# Patient Record
Sex: MALE | Race: BLACK OR AFRICAN AMERICAN | Employment: FULL TIME | URBAN - METROPOLITAN AREA
[De-identification: names, ages, dates, MRNs, and addresses within clinical notes are randomized per-mention and may not be internally consistent; named-entity substitution may affect disease eponyms.]

---

## 2018-02-19 ENCOUNTER — OFFICE VISIT (OUTPATIENT)
Dept: SURGERY | Age: 58
End: 2018-02-19

## 2018-02-19 VITALS
HEART RATE: 93 BPM | RESPIRATION RATE: 16 BRPM | OXYGEN SATURATION: 100 % | DIASTOLIC BLOOD PRESSURE: 104 MMHG | WEIGHT: 227.6 LBS | SYSTOLIC BLOOD PRESSURE: 150 MMHG | TEMPERATURE: 98.1 F | BODY MASS INDEX: 27.72 KG/M2 | HEIGHT: 76 IN

## 2018-02-19 DIAGNOSIS — L98.9 SKIN LESION: Primary | ICD-10-CM

## 2018-02-19 RX ORDER — HYDROCHLOROTHIAZIDE 25 MG/1
TABLET ORAL
Refills: 0 | COMMUNITY
Start: 2018-02-14 | End: 2019-07-12 | Stop reason: SDUPTHER

## 2018-02-19 RX ORDER — ERGOCALCIFEROL 1.25 MG/1
50000 CAPSULE ORAL
COMMUNITY
End: 2021-03-24 | Stop reason: SDUPTHER

## 2018-02-19 RX ORDER — DICLOFENAC SODIUM 75 MG/1
TABLET, DELAYED RELEASE ORAL
Refills: 0 | COMMUNITY
Start: 2018-02-13 | End: 2019-07-12 | Stop reason: SDUPTHER

## 2018-02-19 RX ORDER — CYCLOBENZAPRINE HCL 10 MG
TABLET ORAL
Refills: 0 | COMMUNITY
Start: 2018-01-03 | End: 2019-07-12 | Stop reason: SDUPTHER

## 2018-02-19 NOTE — PROCEDURES
A consent was taken from the patient. A timeout performed. The area was infiltrated with 1% lidocaine.   . An elliptical skin incision was performed the skin lesion was excised  The skin was closed with interrupted 3-0 Vicryl sutures  We'll follow up in one to 2 weeks

## 2018-02-19 NOTE — PROGRESS NOTES
General Surgery Consult    Yamile Haimlton. Admit date: (Not on file)    MRN: [id number]     : 1960     Age: 59 y.o. Attending Physician: Samanta Chan MD, [facility]      History of Present Illness: Yamile Martin is a 59 y.o. male who presented with abdomen and wall skin lesion. The patient had this lesion for about 10 years. It happened after his laparoscopic cholecystectomy at the site of the trocar. It has not changed in size but it's bothering him. He denies any pain. He was referred to me for surgical evaluation. Patient Active Problem List    Diagnosis Date Noted    Hemorrhoids with complication     Family history of colon cancer 2015     Past Medical History:   Diagnosis Date    Hypertension       Past Surgical History:   Procedure Laterality Date    HX OTHER SURGICAL      I&D hemorrhoids x 2      Social History   Substance Use Topics    Smoking status: Current Every Day Smoker     Packs/day: 5.00     Types: Cigarettes    Smokeless tobacco: Never Used      Comment: LESS THAN 5 A DAY    Alcohol use 2.0 oz/week     4 Standard drinks or equivalent per week      History   Smoking Status    Current Every Day Smoker    Packs/day: 5.00    Types: Cigarettes   Smokeless Tobacco    Never Used     Comment: LESS THAN 5 A DAY     Family History   Problem Relation Age of Onset    Cancer Mother      colon ca    Alzheimer Father     Cancer Father       Current Outpatient Prescriptions   Medication Sig    hydroCHLOROthiazide (HYDRODIURIL) 25 mg tablet     diclofenac EC (VOLTAREN) 75 mg EC tablet take 1 tablet by mouth twice a day if needed    cyclobenzaprine (FLEXERIL) 10 mg tablet take 1 tablet by mouth every 8 hours if needed    ergocalciferol (VITAMIN D2) 50,000 unit capsule Take 50,000 Units by mouth.  MULTIVIT WITH MINERALS/LUTEIN (MULTIVITAMIN 50 PLUS PO) Take  by mouth.     amLODIPine (NORVASC) 5 mg tablet     HYDROcodone-acetaminophen (NORCO) 7.5-325 mg per tablet Take 1 Tab by mouth every six (6) hours as needed for Pain. Max Daily Amount: 4 Tabs. No current facility-administered medications for this visit. No Known Allergies       Review of Systems:  Pertinent items are noted in the History of Present Illness. Objective:     Visit Vitals    BP (!) 138/95 (BP 1 Location: Right arm, BP Patient Position: Sitting)    Pulse 93    Temp 98.1 °F (36.7 °C) (Oral)    Resp 16    Ht 6' 4\" (1.93 m)    Wt 103.2 kg (227 lb 9.6 oz)    SpO2 100%    BMI 27.7 kg/m2       Physical Exam:      General:  in no apparent distress   Eyes:  conjunctivae and sclerae normal, pupils equal, round, reactive to light   Throat & Neck: no erythema or exudates noted and neck supple and symmetrical; no palpable masses   Lungs:   clear to auscultation bilaterally   Heart:  Regular rate and rhythm   Abdomen:   rounded, soft, nontender, nondistended, no masses or organomegaly. It is a 0.5 cm skin lesion located in the right upper quadrant. It was located at the site of the previous trocar from his laparoscopic cholecystectomy. Extremities: extremities normal, atraumatic, no cyanosis or edema   Skin: Normal.       Imaging and Lab Review:     CBC: No results found for: WBC, RBC, HGB, HCT, PLT, HGBEXT, HCTEXT, PLTEXT  BMP: No results found for: GLU, NA, K, CL, CO2, BUN, CREA, CA  CMP:No results found for: GLU, NA, K, CL, CO2, BUN, CREA, CA, AGAP, BUCR, TBIL, GPT, AP, TP, ALB, GLOB, AGRAT    No results found for this or any previous visit (from the past 24 hour(s)). images and reports reviewed    Assessment:   Jonathan Espinoza. is a 59 y.o. male is presenting with abdominal wall skin lesion.      Plan:     We'll schedule her for excision of skin lesion of abdominal wall    Please call me if you have any questions (cell phone: [phone])     Signed By: Jaye Lyons MD     February 19, 2018

## 2018-02-19 NOTE — LETTER
2/19/2018 10:05 AM 
 
Patient:  Rory Contreras. YOB: 1960 Date of Visit: 2/19/2018 Rodrigo Gaviria MD 
[address] [place] [address],[address] [place] [address] VIA Facsimile: [phone] Dear Rodrigo Gaviria MD, Thank you for referring Mr. Jacqueline Ramirez to [place] [address] for evaluation and treatment. Below are the relevant portions of my assessment and plan of care. Thank you very much for your referral of Mr. Jacqueline Ramirez. If you have questions, please do not hesitate to call me. I look forward to following Mr. Genia Brunner along with you and will keep you updated as to his progress. Sincerely, Jade Foreman MD

## 2018-02-19 NOTE — MR AVS SNAPSHOT
[address] [address] 
[phone] Patient: Javad Soler MRN: [id number] HTK:4/3/8292 Visit Information Date & Time Provider Department Dept. Phone Encounter #  
 2/19/2018 10:30 AM Shikha Magallon MD [facility] [phone] [id number] Upcoming Health Maintenance Date Due Hepatitis C Screening 1960 Pneumococcal 19-64 Medium Risk (1 of 1 - PPSV23) 9/3/1977 DTaP/Tdap/Td series (1 - Tdap) 9/3/1979 FOBT Q 1 YEAR AGE 50-75 9/3/2008 Influenza Age 5 to Adult 8/1/2017 Allergies as of 2/19/2018  Review Complete On: 2/19/2018 By: Emmanuel Silva No Known Allergies Current Immunizations  Never Reviewed No immunizations on file. Not reviewed this visit You Were Diagnosed With   
  
 Codes Comments Skin lesion    -  Primary ICD-10-CM: L98.9 ICD-9-CM: 709.9 Vitals BP Pulse Temp Resp Height(growth percentile) Weight(growth percentile) (!) 150/104 (BP 1 Location: Left arm, BP Patient Position: Supine) 93 98.1 °F (36.7 °C) (Oral) 16 6' 4\" (1.93 m) 227 lb 9.6 oz (103.2 kg) SpO2 BMI Smoking Status 100% 27.7 kg/m2 Current Every Day Smoker Vitals History BMI and BSA Data Body Mass Index Body Surface Area  
 27.7 kg/m 2 2.35 m 2 Preferred Pharmacy Pharmacy Name Phone [facility], [address] Your Updated Medication List  
  
   
This list is accurate as of: 2/19/18 11:01 AM.  Always use your most recent med list. amLODIPine 5 mg tablet Commonly known as:  NORVASC  
  
 cyclobenzaprine 10 mg tablet Commonly known as:  FLEXERIL  
take 1 tablet by mouth every 8 hours if needed  
  
 diclofenac EC 75 mg EC tablet Commonly known as:  VOLTAREN  
take 1 tablet by mouth twice a day if needed hydroCHLOROthiazide 25 mg tablet Commonly known as:  HYDRODIURIL HYDROcodone-acetaminophen 7.5-325 mg per tablet Commonly known as:  Jaki Olivia Take 1 Tab by mouth every six (6) hours as needed for Pain. Max Daily Amount: 4 Tabs. MULTIVITAMIN 50 PLUS PO Take  by mouth. VITAMIN D2 50,000 unit capsule Generic drug:  ergocalciferol Take 50,000 Units by mouth. Patient Instructions If you have any questions or concerns about today's appointment, the verbal and/or written instructions you were given for follow up care, please call our office at [phone]. [facility] - [address], [address] 
 
[phone] office [phone] Introducing \[facility] [place] [facility]\"" & HEALTH SERVICES! [facility] introduces [employer] patient portal. Now you can access parts of your medical record, email your doctor's office, and request medication refills online. 1. In your internet browser, go to [email or web address]t 2. Click on the First Time User? Click Here link in the Sign In box. You will see the New Member Sign Up page. 3. Enter your [employer] Access Code exactly as it appears below. You will not need to use this code after youve completed the sign-up process. If you do not sign up before the expiration date, you must request a new code. · [employer] Access Code: [email or web address] Expires: 5/20/2018  9:55 AM 
 
4. Enter the last four digits of your Social Security Number (xxxx) and Date of Birth (mm/dd/yyyy) as indicated and click Submit. You will be taken to the next sign-up page. 5. Create a [employer] ID. This will be your [employer] login ID and cannot be changed, so think of one that is secure and easy to remember. 6. Create a [employer] password. You can change your password at any time. 7. Enter your Password Reset Question and Answer. This can be used at a later time if you forget your password. 8. Enter your e-mail address. You will receive e-mail notification when new information is available in [address] 9. Click Sign Up. You can now view and download portions of your medical record. 10. Click the Download Summary menu link to download a portable copy of your medical information. If you have questions, please visit the Frequently Asked Questions section of the [employer] website. Remember, [employer] is NOT to be used for urgent needs. For medical emergencies, dial 911. Now available from your iPhone and Android! Please provide this summary of care documentation to your next provider. Your primary care clinician is listed as TRISTAN Alva. If you have any questions after today's visit, please call [phone].

## 2018-02-19 NOTE — PROGRESS NOTES
Amrita Conner. is a 59 y.o. male who presents for a surgical evaluation of an abdominal skin lesion of the RLQ. Patient verbally agrees to permit the medical students working in [address] [place] [address] office to observe and participate in surgical care during the appointment today, including, where appropriate, providing direct surgical care to patient under the physicians direct supervision. Patient agrees that he/she has been given the opportunity to refuse to give such consent and may withdraw consent at any time during appointment.

## 2018-02-19 NOTE — PATIENT INSTRUCTIONS
If you have any questions or concerns about today's appointment, the verbal and/or written instructions you were given for follow up care, please call our office at [phone].     [facility] - [address]    [phone] office  [phone]

## 2018-03-02 ENCOUNTER — TELEPHONE (OUTPATIENT)
Dept: SURGERY | Age: 58
End: 2018-03-02

## 2018-03-02 DIAGNOSIS — L98.9 SKIN LESION: ICD-10-CM

## 2018-03-05 ENCOUNTER — OFFICE VISIT (OUTPATIENT)
Dept: SURGERY | Age: 58
End: 2018-03-05

## 2018-03-05 VITALS
DIASTOLIC BLOOD PRESSURE: 106 MMHG | BODY MASS INDEX: 27.74 KG/M2 | HEART RATE: 99 BPM | HEIGHT: 76 IN | WEIGHT: 227.8 LBS | SYSTOLIC BLOOD PRESSURE: 151 MMHG | TEMPERATURE: 97.4 F

## 2018-03-05 DIAGNOSIS — Z09 POSTOPERATIVE EXAMINATION: Primary | ICD-10-CM

## 2018-03-05 NOTE — MR AVS SNAPSHOT
[address] [facility] 
 
 
 [address] [facility] [address] [place] [address] [address] 
[phone] Patient: Rory Contreras. MRN: [id number] JZE:2/1/3435 Visit Information Date & Time Provider Department Dept. Phone Encounter #  
 3/5/2018 10:45 AM Jade Foreman MD [facility] [phone] [id number] Your Appointments 8/27/2018 10:30 AM  
Follow Up with Jade Foreman MD  
[address] [place] [address] [facility] Appt Note: 6 month follow up  
 [address] [facility] [address] [place] [place]  
  
   
 [address] [address] Upcoming Health Maintenance Date Due Hepatitis C Screening 1960 Pneumococcal 19-64 Medium Risk (1 of 1 - PPSV23) 9/3/1977 DTaP/Tdap/Td series (1 - Tdap) 9/3/1979 FOBT Q 1 YEAR AGE 50-75 9/3/2008 Influenza Age 5 to Adult 8/1/2017 Allergies as of 3/5/2018  Review Complete On: 3/5/2018 By: Darryle Quale, LPN No Known Allergies Current Immunizations  Never Reviewed No immunizations on file. Not reviewed this visit You Were Diagnosed With   
  
 Codes Comments Postoperative examination    -  Primary ICD-10-CM: G52 ICD-9-CM: V67.00 Vitals BP Pulse Temp Height(growth percentile) Weight(growth percentile) BMI  
 (!) 151/106 (BP 1 Location: Right arm, BP Patient Position: Sitting) 99 97.4 °F (36.3 °C) (Oral) 6' 4\" (1.93 m) 227 lb 12.8 oz (103.3 kg) 27.73 kg/m2 Smoking Status Current Every Day Smoker BMI and BSA Data Body Mass Index Body Surface Area  
 27.73 kg/m 2 2.35 m 2 Preferred Pharmacy Pharmacy Name Phone [facility], [address] Your Updated Medication List  
  
   
This list is accurate as of 3/5/18 10:56 AM.  Always use your most recent med list.  
  
  
  
  
 amLODIPine 5 mg tablet Commonly known as:  NORVASC  
  
 cyclobenzaprine 10 mg tablet Commonly known as:  FLEXERIL  
take 1 tablet by mouth every 8 hours if needed  
  
 diclofenac EC 75 mg EC tablet Commonly known as:  VOLTAREN  
take 1 tablet by mouth twice a day if needed  
  
 hydroCHLOROthiazide 25 mg tablet Commonly known as:  HYDRODIURIL HYDROcodone-acetaminophen 7.5-325 mg per tablet Commonly known as:  [facility] Take 1 Tab by mouth every six (6) hours as needed for Pain. Max Daily Amount: 4 Tabs. MULTIVITAMIN 50 PLUS PO Take  by mouth. VITAMIN D2 50,000 unit capsule Generic drug:  ergocalciferol Take 50,000 Units by mouth. Patient Instructions If you have any questions or concerns about today's appointment, the verbal and/or written instructions you were given for follow up care, please call our office at [phone]. [employer] [facility] [address] 
 
[phone] office [phone] Introducing [facility] & HEALTH SERVICES! [employer] introduces [employer] patient portal. Now you can access parts of your medical record, email your doctor's office, and request medication refills online. 1. In your internet browser, go to [email or web address] 2. Click on the First Time User? Click Here link in the Sign In box. You will see the New Member Sign Up page. 3. Enter your [employer] Access Code exactly as it appears below. You will not need to use this code after youve completed the sign-up process. If you do not sign up before the expiration date, you must request a new code. · [employer] Access Code: [email or web address] Expires: 5/20/2018  9:55 AM 
 
4. Enter the last four digits of your Social Security Number (xxxx) and Date of Birth (mm/dd/yyyy) as indicated and click Submit. You will be taken to the next sign-up page. 5. Create a [employer] ID. This will be your [employer] login ID and cannot be changed, so think of one that is secure and easy to remember. 6. Create a [employer] password. You can change your password at any time. 7. Enter your Password Reset Question and Answer. This can be used at a later time if you forget your password. 8. Enter your e-mail address. You will receive e-mail notification when new information is available in [address] 9. Click Sign Up. You can now view and download portions of your medical record. 10. Click the Download Summary menu link to download a portable copy of your medical information. If you have questions, please visit the Frequently Asked Questions section of the [employer] website. Remember, [employer] is NOT to be used for urgent needs. For medical emergencies, dial 911. Now available from your iPhone and Android! Please provide this summary of care documentation to your next provider. Your primary care clinician is listed as TRISTAN Valerio. If you have any questions after today's visit, please call [phone].

## 2018-03-05 NOTE — PROGRESS NOTES
Patient seen and examined. He's doing well. His wound is healing well. 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   Plan:  No need for any surgical intervention currently  Follow up at six-month

## 2018-03-05 NOTE — PATIENT INSTRUCTIONS
If you have any questions or concerns about today's appointment, the verbal and/or written instructions you were given for follow up care, please call our office at [phone].     [facility] - [address]    [phone] office  [phone]

## 2018-03-05 NOTE — PROGRESS NOTES
Chief Complaint   Patient presents with    Post OP Follow Up     excision of right abdominal wall skin lesion done on 2/19/18     No pain associated today. No drainage noted, site dry and intact. 1. Have you been to the ER, urgent care clinic since your last visit? Hospitalized since your last visit? No    2. Have you seen or consulted any other health care providers outside of the [address] [place] [address] since your last visit? Include any pap smears or colon screening. No     Patient verbally agrees to permit the medical students working in [address] [place] [address] office to observe and participate in surgical care during the appointment today, including, where appropriate, providing direct surgical care to patient under the physicians direct supervision. Patient agrees that he/she has been given the opportunity to refuse to give such consent and may withdraw consent at any time during appointment.

## 2018-08-23 ENCOUNTER — TELEPHONE (OUTPATIENT)
Dept: SURGERY | Age: 58
End: 2018-08-23

## 2018-09-26 ENCOUNTER — OFFICE VISIT (OUTPATIENT)
Dept: SURGERY | Age: 58
End: 2018-09-26

## 2018-09-26 ENCOUNTER — DOCUMENTATION ONLY (OUTPATIENT)
Dept: SURGERY | Age: 58
End: 2018-09-26

## 2018-09-26 VITALS
TEMPERATURE: 98.1 F | RESPIRATION RATE: 16 BRPM | HEART RATE: 79 BPM | DIASTOLIC BLOOD PRESSURE: 82 MMHG | SYSTOLIC BLOOD PRESSURE: 124 MMHG | OXYGEN SATURATION: 98 % | BODY MASS INDEX: 27.81 KG/M2 | HEIGHT: 76 IN | WEIGHT: 228.4 LBS

## 2018-09-26 DIAGNOSIS — C44.90 SKIN CANCER: Primary | ICD-10-CM

## 2018-09-26 NOTE — PROGRESS NOTES
General Surgery Consult    Katelynn Del Rosario Admit date: (Not on file)    MRN: [id number]     : 1960     Age: 59 y.o. Attending Physician: Kamar Ospina MD, [facility]      History of Present Illness: Katelynn Del Rosario is a 59 y.o. male who is known to me. I performed an excision of an abdominal skin lesion at the site of the previous laparoscopic cholecystectomy. That was done about 6 months ago. The pathology came back as squamous cell carcinoma well differentiated with negative margins the patient is here for routine follow-up. He is doing great with no complains. The patient stated that he does not have a primary care physician and he did not see any medical oncologist.     Patient Active Problem List    Diagnosis Date Noted    Hemorrhoids with complication     Family history of colon cancer 2015     Past Medical History:   Diagnosis Date    Hypertension       Past Surgical History:   Procedure Laterality Date    HX OTHER SURGICAL      I&D hemorrhoids x 2      Social History   Substance Use Topics    Smoking status: Current Every Day Smoker     Packs/day: 5.00     Types: Cigarettes    Smokeless tobacco: Never Used      Comment: LESS THAN 5 A DAY    Alcohol use 2.0 oz/week     4 Standard drinks or equivalent per week      History   Smoking Status    Current Every Day Smoker    Packs/day: 5.00    Types: Cigarettes   Smokeless Tobacco    Never Used     Comment: LESS THAN 5 A DAY     Family History   Problem Relation Age of Onset    Cancer Mother      colon ca    Alzheimer Father     Cancer Father       Current Outpatient Prescriptions   Medication Sig    hydroCHLOROthiazide (HYDRODIURIL) 25 mg tablet     diclofenac EC (VOLTAREN) 75 mg EC tablet take 1 tablet by mouth twice a day if needed    cyclobenzaprine (FLEXERIL) 10 mg tablet take 1 tablet by mouth every 8 hours if needed    ergocalciferol (VITAMIN D2) 50,000 unit capsule Take 50,000 Units by mouth.     MULTIVIT WITH MINERALS/LUTEIN (MULTIVITAMIN 50 PLUS PO) Take  by mouth.  HYDROcodone-acetaminophen (NORCO) 7.5-325 mg per tablet Take 1 Tab by mouth every six (6) hours as needed for Pain. Max Daily Amount: 4 Tabs.  amLODIPine (NORVASC) 5 mg tablet      No current facility-administered medications for this visit. No Known Allergies       Review of Systems:  Pertinent items are noted in the History of Present Illness. Objective:     Visit Vitals    /82 (BP 1 Location: Left arm, BP Patient Position: Sitting)    Pulse 79    Temp 98.1 °F (36.7 °C) (Oral)    Resp 16    Ht 6' 4\" (1.93 m)    Wt 103.6 kg (228 lb 6.4 oz)    SpO2 98%    BMI 27.8 kg/m2       Physical Exam:      General:  in no apparent distress, alert and oriented times 3   Eyes:  conjunctivae and sclerae normal, pupils equal, round, reactive to light   Throat & Neck: no erythema or exudates noted and neck supple and symmetrical; no palpable masses           Abdomen:   soft, nontender, nondistended, no masses or organomegaly. Wound site is healing well. Extremities: extremities normal, atraumatic, no cyanosis or edema   Skin: Normal.       Imaging and Lab Review:     CBC: No results found for: WBC, RBC, HGB, HCT, PLT, HGBEXT, HCTEXT, PLTEXT  BMP: No results found for: GLU, NA, K, CL, CO2, BUN, CREA, CA  CMP:No results found for: GLU, NA, K, CL, CO2, BUN, CREA, CA, AGAP, BUCR, TBIL, GPT, AP, TP, ALB, GLOB, AGRAT    No results found for this or any previous visit (from the past 24 hour(s)). images and reports reviewed    Assessment:   David James. is a 59 y.o. male is presenting with history of squamous cell cancer of anterior abdominal wall on the right side. It is located at the site of a previous laparoscopic cholecystectomy. There is no evidence of any recurrence however the patient does not have a primary care physician and he did not see a medical oncologist.     Plan:      We will arrange that for the patient to be seen by medical oncology  Follow-up with me in 6-months to 1 year    Please call me if you have any questions (cell phone: [phone])     Signed By: Chanda Campbell MD     September 26, 2018

## 2018-09-26 NOTE — PATIENT INSTRUCTIONS
If you have any questions or concerns about today's appointment, the verbal and/or written instructions you were given for follow up care, please call our office at [phone].     [facility] - [address]    [phone] office  [phone] fax      Appointment with Dr. Loy Bae at [facility] on Wednesday, October 17, 2018 at 1:30pm at [address] [place], [address] (s) [phone]

## 2018-09-26 NOTE — PROGRESS NOTES
Patient presents for 6 month follow up from having skin lesion removed from abdomen. 1. Have you been to the ER, urgent care clinic since your last visit? Hospitalized since your last visit? No    2. Have you seen or consulted any other health care providers outside of the [address] [place] [address] since your last visit? Include any pap smears or colon screening.  No

## 2018-09-26 NOTE — MR AVS SNAPSHOT
Luis M Noel 
 
 
 [id number] [address] [address] 
[phone] Patient: Moon Fairbanks. MRN: [id number] Visit Information Date & Time Provider Department Dept. Phone Encounter #  
 9/26/2018 11:00 AM MD [facility] [phone] [id number] Upcoming Health Maintenance Date Due Hepatitis C Screening 1960 Pneumococcal 19-64 Medium Risk (1 of 1 - PPSV23) 9/3/1977 DTaP/Tdap/Td series (1 - Tdap) 9/3/1979 Shingrix Vaccine Age 50> (1 of 2) 9/3/2008 FOBT Q 1 YEAR AGE 50-75 9/3/2008 Influenza Age 5 to Adult 8/1/2018 Allergies as of 9/26/2018  Review Complete On: 9/26/2018 By: Rene Aldana LPN No Known Allergies Current Immunizations  Never Reviewed No immunizations on file. Not reviewed this visit You Were Diagnosed With   
  
 Codes Comments Skin cancer    -  Primary ICD-10-CM: C44.90 ICD-9-CM: 173.90 Vitals BP Pulse Temp Resp Height(growth percentile) Weight(growth percentile) 124/82 (BP 1 Location: Left arm, BP Patient Position: Sitting) 79 98.1 °F (36.7 °C) (Oral) 16 6' 4\" (1.93 m) 228 lb 6.4 oz (103.6 kg) SpO2 BMI Smoking Status 98% 27.8 kg/m2 Current Every Day Smoker Vitals History BMI and BSA Data Body Mass Index Body Surface Area  
 27.8 kg/m 2 2.36 m 2 Preferred Pharmacy Pharmacy Name Phone [facility] [address] Your Updated Medication List  
  
   
This list is accurate as of 9/26/18 11:00 AM.  Always use your most recent med list. amLODIPine 5 mg tablet Commonly known as:  NORVASC  
  
 cyclobenzaprine 10 mg tablet Commonly known as:  FLEXERIL  
take 1 tablet by mouth every 8 hours if needed  
  
 diclofenac EC 75 mg EC tablet Commonly known as:  VOLTAREN  
 take 1 tablet by mouth twice a day if needed  
  
 hydroCHLOROthiazide 25 mg tablet Commonly known as:  HYDRODIURIL HYDROcodone-acetaminophen 7.5-325 mg per tablet Commonly known as:  Suly Mello Take 1 Tab by mouth every six (6) hours as needed for Pain. Max Daily Amount: 4 Tabs. MULTIVITAMIN 50 PLUS PO Take  by mouth. VITAMIN D2 50,000 unit capsule Generic drug:  ergocalciferol Take 50,000 Units by mouth. Patient Instructions If you have any questions or concerns about today's appointment, the verbal and/or written instructions you were given for follow up care, please call our office at [phone]. [facility] - [address], [address] 
 
[phone] office [phone] fax Appointment with Dr. Zoraida Davis at [facility] on Wednesday, October 17, 2018 at 1:30pm at [address] [place], [address] (i) [phone] Patient Instructions History Introducing [facility]! [facility] introduces Mibio patient portal. Now you can access parts of your medical record, email your doctor's office, and request medication refills online. 1. In your internet browser, go to [email or web address] 2. Click on the First Time User? Click Here link in the Sign In box. You will see the New Member Sign Up page. 3. Enter your Mibio Access Code exactly as it appears below. You will not need to use this code after youve completed the sign-up process. If you do not sign up before the expiration date, you must request a new code. · Mibio Access Code: [email or web address] Expires: 12/25/2018 10:30 AM 
 
4. Enter the last four digits of your Social Security Number (xxxx) and Date of Birth (mm/dd/yyyy) as indicated and click Submit. You will be taken to the next sign-up page. 5. Create a thesweetlinkt ID.  This will be your Mibio login ID and cannot be changed, so think of one that is secure and easy to remember. 6. Create a Gymtrack password. You can change your password at any time. 7. Enter your Password Reset Question and Answer. This can be used at a later time if you forget your password. 8. Enter your e-mail address. You will receive e-mail notification when new information is available in [address] 9. Click Sign Up. You can now view and download portions of your medical record. 10. Click the Download Summary menu link to download a portable copy of your medical information. If you have questions, please visit the Frequently Asked Questions section of the Gymtrack website. Remember, Gymtrack is NOT to be used for urgent needs. For medical emergencies, dial 911. Now available from your iPhone and Android! Please provide this summary of care documentation to your next provider. Your primary care clinician is listed as TRITSAN Doll. If you have any questions after today's visit, please call [phone].

## 2018-09-26 NOTE — PROGRESS NOTES
Appointment Wednesday, October 17, 2018 at 2:00pm/1:30pm check in with Dr. Ishaan Borges at [facility] located at [address] (i) [phone]

## 2019-07-12 ENCOUNTER — OFFICE VISIT (OUTPATIENT)
Dept: INTERNAL MEDICINE CLINIC | Age: 59
End: 2019-07-12

## 2019-07-12 ENCOUNTER — HOSPITAL ENCOUNTER (OUTPATIENT)
Dept: LAB | Age: 59
Discharge: HOME | End: 2019-07-12
Payer: MEDICAID

## 2019-07-12 VITALS
BODY MASS INDEX: 28.67 KG/M2 | RESPIRATION RATE: 20 BRPM | HEART RATE: 91 BPM | OXYGEN SATURATION: 96 % | HEIGHT: 76 IN | DIASTOLIC BLOOD PRESSURE: 91 MMHG | SYSTOLIC BLOOD PRESSURE: 137 MMHG | TEMPERATURE: 98.9 F | WEIGHT: 235.4 LBS

## 2019-07-12 DIAGNOSIS — M54.50 CHRONIC BILATERAL LOW BACK PAIN WITHOUT SCIATICA: ICD-10-CM

## 2019-07-12 DIAGNOSIS — G89.29 CHRONIC BILATERAL LOW BACK PAIN WITHOUT SCIATICA: ICD-10-CM

## 2019-07-12 DIAGNOSIS — R10.9 LEFT FLANK PAIN: ICD-10-CM

## 2019-07-12 DIAGNOSIS — K21.9 GASTROESOPHAGEAL REFLUX DISEASE, ESOPHAGITIS PRESENCE NOT SPECIFIED: ICD-10-CM

## 2019-07-12 DIAGNOSIS — I10 ESSENTIAL HYPERTENSION: Primary | ICD-10-CM

## 2019-07-12 LAB
ALBUMIN SERPL-MCNC: 4 G/DL (ref 3.4–5)
ALBUMIN/GLOB SERPL: 0.9 {RATIO} (ref 0.8–1.7)
ALP SERPL-CCNC: 82 U/L (ref 45–117)
ALT SERPL-CCNC: 49 U/L (ref 16–61)
ANION GAP SERPL CALC-SCNC: 8 MMOL/L (ref 3–18)
AST SERPL-CCNC: 41 U/L (ref 15–37)
BILIRUB SERPL-MCNC: 0.7 MG/DL (ref 0.2–1)
BILIRUB UR QL STRIP: NEGATIVE
BUN SERPL-MCNC: 16 MG/DL (ref 7–18)
BUN/CREAT SERPL: 15 (ref 12–20)
CALCIUM SERPL-MCNC: 9.3 MG/DL (ref 8.5–10.1)
CHLORIDE SERPL-SCNC: 98 MMOL/L (ref 100–108)
CHOLEST SERPL-MCNC: 231 MG/DL
CO2 SERPL-SCNC: 29 MMOL/L (ref 21–32)
CREAT SERPL-MCNC: 1.09 MG/DL (ref 0.6–1.3)
ERYTHROCYTE [DISTWIDTH] IN BLOOD BY AUTOMATED COUNT: 14.8 % (ref 11.6–14.5)
EST. AVERAGE GLUCOSE BLD GHB EST-MCNC: 128 MG/DL
GLOBULIN SER CALC-MCNC: 4.7 G/DL (ref 2–4)
GLUCOSE SERPL-MCNC: 96 MG/DL (ref 74–99)
GLUCOSE UR-MCNC: NEGATIVE MG/DL
HBA1C MFR BLD: 6.1 % (ref 4.2–5.6)
HCT VFR BLD AUTO: 45.1 % (ref 36–48)
HDLC SERPL-MCNC: 69 MG/DL (ref 40–60)
HDLC SERPL: 3.3 {RATIO} (ref 0–5)
HGB BLD-MCNC: 14.6 G/DL (ref 13–16)
KETONES P FAST UR STRIP-MCNC: NEGATIVE MG/DL
LDLC SERPL CALC-MCNC: 137.4 MG/DL (ref 0–100)
LIPID PROFILE,FLP: ABNORMAL
MCH RBC QN AUTO: 29.6 PG (ref 24–34)
MCHC RBC AUTO-ENTMCNC: 32.4 G/DL (ref 31–37)
MCV RBC AUTO: 91.5 FL (ref 74–97)
PH UR STRIP: 6.5 [PH] (ref 4.6–8)
PLATELET # BLD AUTO: 220 K/UL (ref 135–420)
PMV BLD AUTO: 9.9 FL (ref 9.2–11.8)
POTASSIUM SERPL-SCNC: 3.5 MMOL/L (ref 3.5–5.5)
PROT SERPL-MCNC: 8.7 G/DL (ref 6.4–8.2)
PROT UR QL STRIP: NEGATIVE
RBC # BLD AUTO: 4.93 M/UL (ref 4.7–5.5)
SODIUM SERPL-SCNC: 135 MMOL/L (ref 136–145)
SP GR UR STRIP: 1.02 (ref 1–1.03)
TRIGL SERPL-MCNC: 123 MG/DL (ref ?–150)
UA UROBILINOGEN AMB POC: NORMAL (ref 0.2–1)
URINALYSIS CLARITY POC: CLEAR
URINALYSIS COLOR POC: YELLOW
URINE BLOOD POC: NEGATIVE
URINE LEUKOCYTES POC: NEGATIVE
URINE NITRITES POC: NEGATIVE
VLDLC SERPL CALC-MCNC: 24.6 MG/DL
WBC # BLD AUTO: 7.3 K/UL (ref 4.6–13.2)

## 2019-07-12 PROCEDURE — 83013 H PYLORI (C-13) BREATH: CPT

## 2019-07-12 PROCEDURE — 85027 COMPLETE CBC AUTOMATED: CPT

## 2019-07-12 PROCEDURE — 80053 COMPREHEN METABOLIC PANEL: CPT

## 2019-07-12 PROCEDURE — 80061 LIPID PANEL: CPT

## 2019-07-12 PROCEDURE — 83036 HEMOGLOBIN GLYCOSYLATED A1C: CPT

## 2019-07-12 RX ORDER — CYCLOBENZAPRINE HCL 10 MG
TABLET ORAL
Qty: 180 TAB | Refills: 0 | Status: SHIPPED | OUTPATIENT
Start: 2019-07-12 | End: 2022-02-15 | Stop reason: SDUPTHER

## 2019-07-12 RX ORDER — HYDROCHLOROTHIAZIDE 25 MG/1
25 TABLET ORAL DAILY
Qty: 90 TAB | Refills: 1 | Status: SHIPPED | OUTPATIENT
Start: 2019-07-12 | End: 2020-01-03 | Stop reason: SDUPTHER

## 2019-07-12 RX ORDER — DICLOFENAC SODIUM 75 MG/1
TABLET, DELAYED RELEASE ORAL
Qty: 180 TAB | Refills: 0 | Status: SHIPPED | OUTPATIENT
Start: 2019-07-12 | End: 2020-08-10

## 2019-07-12 RX ORDER — AMLODIPINE BESYLATE 5 MG/1
5 TABLET ORAL DAILY
Qty: 90 TAB | Refills: 1 | Status: SHIPPED | OUTPATIENT
Start: 2019-07-12 | End: 2020-01-03 | Stop reason: SDUPTHER

## 2019-07-12 RX ORDER — OMEPRAZOLE 20 MG/1
20 CAPSULE, DELAYED RELEASE ORAL DAILY
Qty: 90 CAP | Refills: 1 | Status: SHIPPED | OUTPATIENT
Start: 2019-07-12 | End: 2020-01-03 | Stop reason: SDUPTHER

## 2019-07-12 NOTE — PROGRESS NOTES
Rm: 15    Chief Complaint   Patient presents with    Medication Refill    Back Pain     pt states he was diagnosed with Arthritis in his back    Side Pain     right side     Depression Screening:  3 most recent PHQ Screens 7/12/2019   Little interest or pleasure in doing things Not at all   Feeling down, depressed, irritable, or hopeless Not at all   Total Score PHQ 2 0       Learning Assessment:  Learning Assessment 7/12/2019 4/29/2015   PRIMARY LEARNER Patient Patient   HIGHEST LEVEL OF EDUCATION - PRIMARY LEARNER  GRADUATED HIGH SCHOOL OR GED -   PRIMARY LANGUAGE ENGLISH ENGLISH   LEARNER PREFERENCE PRIMARY READING DEMONSTRATION   ANSWERED BY patient pt   RELATIONSHIP SELF SELF       Abuse Screening:  No flowsheet data found. Health Maintenance reviewed and discussed per provider: yes     Coordination of Care:    1. Have you been to the ER, urgent care clinic since your last visit? Hospitalized since your last visit? no    2. Have you seen or consulted any other health care providers outside of the [address] [place] [address] since your last visit? Include any pap smears or colon screening.  no

## 2019-07-12 NOTE — PROGRESS NOTES
HISTORY OF PRESENT ILLNESS  Montse Duke is a 59 y.o. male. HPI  Mr. Raven Mckeon presents as a new patient to establish care and refill medications. 1) HTN - taking Amlodipine 5 mg and HCTZ 25 mg but soon to run out. Taking daily consistently. 2) Chronic low back pain - c/o chronic mid-low back pain. - More recently c/o left flank pain x 3 weeks. Also c/o increased urination. This pain radiates down toward his left leg at times. Admits to occasional numbness and tingling of jody legs. - He just started a new job ~3 weeks ago. He works in an [employer] and does a lot of lifting.   - No Rx or OTC therapies tried. No Ibuprofen or Aleve. - Hx of taking Diclofenac and Flexeril. These worked well for him. He desires a refill. 3) C/o chronic reflux x years. - He has taken Zantac intermittently but this has not worked well. He does not recall Prilosec or Nexium. Review of Systems   Genitourinary: Positive for frequency. Negative for dysuria, hematuria and urgency. Musculoskeletal: Positive for back pain. Visit Vitals  BP (!) 137/91 (BP 1 Location: Right arm, BP Patient Position: Sitting)   Pulse 91   Temp 98.9 °F (37.2 °C) (Oral)   Resp 20   Ht 6' 4\" (1.93 m)   Wt 235 lb 6.4 oz (106.8 kg)   SpO2 96%   BMI 28.65 kg/m²       Physical Exam   Constitutional: He is oriented to person, place, and time. He appears well-developed and well-nourished. No distress. HENT:   Head: Normocephalic and atraumatic. Right Ear: Tympanic membrane, external ear and ear canal normal.   Left Ear: Tympanic membrane, external ear and ear canal normal.   Nose: Nose normal.   Mouth/Throat: Uvula is midline, oropharynx is clear and moist and mucous membranes are normal. No oropharyngeal exudate, posterior oropharyngeal edema, posterior oropharyngeal erythema or tonsillar abscesses. Eyes: Pupils are equal, round, and reactive to light. Conjunctivae are normal. No scleral icterus.    Neck: Neck supple. Cardiovascular: Normal rate, regular rhythm and normal heart sounds. Exam reveals no gallop. No murmur heard. Pulses:       Dorsalis pedis pulses are 2+ on the right side, and 2+ on the left side. Posterior tibial pulses are 2+ on the right side, and 2+ on the left side. No pedal edema. Pulmonary/Chest: Effort normal and breath sounds normal. No respiratory distress. He has no decreased breath sounds. He has no wheezes. He has no rhonchi. He has no rales. Abdominal: There is no CVA tenderness. Musculoskeletal:        Lumbar back: He exhibits tenderness. Back:    Lymphadenopathy:        Head (right side): No submandibular and no tonsillar adenopathy present. Head (left side): No submandibular and no tonsillar adenopathy present. He has no cervical adenopathy. Right: No supraclavicular adenopathy present. Left: No supraclavicular adenopathy present. Neurological: He is alert and oriented to person, place, and time. Skin: Skin is warm and dry. Psychiatric: He has a normal mood and affect. His speech is normal.     Results for orders placed or performed in visit on 07/12/19   AMB POC URINALYSIS DIP STICK AUTO W/O MICRO   Result Value Ref Range    Color (UA POC) Yellow     Clarity (UA POC) Clear     Glucose (UA POC) Negative Negative    Bilirubin (UA POC) Negative Negative    Ketones (UA POC) Negative Negative    Specific gravity (UA POC) 1.020 1.001 - 1.035    Blood (UA POC) Negative Negative    pH (UA POC) 6.5 4.6 - 8.0    Protein (UA POC) Negative Negative    Urobilinogen (UA POC) 1 mg/dL 0.2 - 1    Nitrites (UA POC) Negative Negative    Leukocyte esterase (UA POC) Negative Negative       ASSESSMENT and PLAN  Diagnoses and all orders for this visit:    1. Essential hypertension  -     hydroCHLOROthiazide (HYDRODIURIL) 25 mg tablet; Take 1 Tab by mouth daily. -     amLODIPine (NORVASC) 5 mg tablet;  Take 1 Tab by mouth daily.  -     CBC W/O DIFF; Future  -     METABOLIC PANEL, COMPREHENSIVE; Future  -     LIPID PANEL; Future  -     HEMOGLOBIN A1C W/O EAG; Future    2. Chronic bilateral low back pain without sciatica  -     diclofenac EC (VOLTAREN) 75 mg EC tablet; take 1 tablet by mouth twice a day if needed  -     cyclobenzaprine (FLEXERIL) 10 mg tablet; take 1 tablet by mouth every 8 hours if needed. Caution: Drowsiness. No driving or working with use. 3. Left flank pain  -     AMB POC URINALYSIS DIP STICK AUTO W/O MICRO  - Consistent with musculoskeletal etiology. 4. Gastroesophageal reflux disease, esophagitis presence not specified  -     H. PYLORI BREATH TEST; Future  -     omeprazole (PRILOSEC) 20 mg capsule; Take 1 Cap by mouth daily. Follow-up and Dispositions    · Return in about 5 months (around 12/12/2019).

## 2019-07-13 NOTE — PROGRESS NOTES
ASCVD 10-year risk of 14.4%. Non-fasting non-HDL = 162. **Please advise patient that his labs showed elevated cholesterol and prediabetes. I recommend we start him on a cholesterol medication in addition to his BP medications. I recommend Lipitor 20 mg daily. Is he agreeable? If so, I will send a Rx to his pharmacy.

## 2019-07-14 LAB — UREA BREATH TEST QL: NEGATIVE

## 2019-07-22 NOTE — PROGRESS NOTES
Attempted to contact pt at  number, no answer. m for pt to return call to office at [phone]. Will continue to try to contact pt.

## 2019-08-07 NOTE — PROGRESS NOTES
2nd attempt to contact patient. LVM requesting patient to contact the office. Will mail letter to patient address on file.  Requesting pt to contact the office

## 2019-08-08 ENCOUNTER — TELEPHONE (OUTPATIENT)
Dept: INTERNAL MEDICINE CLINIC | Age: 59
End: 2019-08-08

## 2019-08-08 NOTE — LETTER
8/12/2019 4:19 PM 
 
Mr. [address] [facility] [phone] Here is the diet information for cholesterol and prediabetes. Sincerely, Tera Manriquez MD

## 2019-08-12 RX ORDER — ATORVASTATIN CALCIUM 20 MG/1
20 TABLET, FILM COATED ORAL DAILY
Qty: 90 TAB | Refills: 1 | Status: SHIPPED | OUTPATIENT
Start: 2019-08-12 | End: 2020-01-03 | Stop reason: SDUPTHER

## 2019-08-12 NOTE — TELEPHONE ENCOUNTER
Please advise patient that his labs showed elevated cholesterol and prediabetes. I recommend we start him on a cholesterol medication in addition to his BP medications. I recommend Lipitor 20 mg daily. Is he agreeable? If so, I will send a Rx to his pharmacy. Spoke with patient and 2 patient identifiers was confirmed. Patient was given results above and verbalized understanding . Patient has no questions/concerns  at this time. Patient is willing to start medication and diet information was mailed to pt.

## 2019-08-14 ENCOUNTER — TELEPHONE (OUTPATIENT)
Dept: INTERNAL MEDICINE CLINIC | Age: 59
End: 2019-08-14

## 2019-08-14 NOTE — TELEPHONE ENCOUNTER
Patient calling for advice patient stated his bowel movements color is green patient would like to know if this is normal

## 2019-08-15 NOTE — TELEPHONE ENCOUNTER
Attempted to contact pt at  number, no answer. Lvm for pt to return call to office at [phone] . Will continue to try to contact pt.

## 2019-08-20 NOTE — TELEPHONE ENCOUNTER
Patient contacted at home number. 2 patient identifiers confirmed. Patient states he contacted office because he was concerned that his BM was green like grass for two days. Patient denied any other symptoms such as stomach pain or cramping. Patient states he believes it was something he ate around that time. Patient states issue has resolved and stool is now back to normal color. No other questions at this time.

## 2020-01-03 ENCOUNTER — HOSPITAL ENCOUNTER (OUTPATIENT)
Dept: LAB | Age: 60
Discharge: HOME | End: 2020-01-03
Payer: MEDICAID

## 2020-01-03 ENCOUNTER — OFFICE VISIT (OUTPATIENT)
Dept: INTERNAL MEDICINE CLINIC | Age: 60
End: 2020-01-03

## 2020-01-03 VITALS
OXYGEN SATURATION: 97 % | SYSTOLIC BLOOD PRESSURE: 134 MMHG | WEIGHT: 236 LBS | TEMPERATURE: 98.5 F | DIASTOLIC BLOOD PRESSURE: 91 MMHG | RESPIRATION RATE: 18 BRPM | BODY MASS INDEX: 28.74 KG/M2 | HEIGHT: 76 IN | HEART RATE: 79 BPM

## 2020-01-03 DIAGNOSIS — Z12.5 PROSTATE CANCER SCREENING: ICD-10-CM

## 2020-01-03 DIAGNOSIS — R73.03 PREDIABETES: ICD-10-CM

## 2020-01-03 DIAGNOSIS — I10 ESSENTIAL HYPERTENSION: ICD-10-CM

## 2020-01-03 DIAGNOSIS — R79.89 ELEVATED LFTS: ICD-10-CM

## 2020-01-03 DIAGNOSIS — N52.9 ERECTILE DYSFUNCTION, UNSPECIFIED ERECTILE DYSFUNCTION TYPE: ICD-10-CM

## 2020-01-03 DIAGNOSIS — E78.5 HYPERLIPIDEMIA, UNSPECIFIED HYPERLIPIDEMIA TYPE: ICD-10-CM

## 2020-01-03 DIAGNOSIS — M54.50 CHRONIC MIDLINE LOW BACK PAIN WITHOUT SCIATICA: ICD-10-CM

## 2020-01-03 DIAGNOSIS — K21.9 GASTROESOPHAGEAL REFLUX DISEASE, ESOPHAGITIS PRESENCE NOT SPECIFIED: ICD-10-CM

## 2020-01-03 DIAGNOSIS — F17.200 SMOKING: ICD-10-CM

## 2020-01-03 DIAGNOSIS — I10 ESSENTIAL HYPERTENSION: Primary | ICD-10-CM

## 2020-01-03 DIAGNOSIS — G89.29 CHRONIC MIDLINE LOW BACK PAIN WITHOUT SCIATICA: ICD-10-CM

## 2020-01-03 LAB
ALBUMIN SERPL-MCNC: 4.2 G/DL (ref 3.4–5)
ALBUMIN/GLOB SERPL: 0.9 {RATIO} (ref 0.8–1.7)
ALP SERPL-CCNC: 88 U/L (ref 45–117)
ALT SERPL-CCNC: 80 U/L (ref 16–61)
ANION GAP SERPL CALC-SCNC: 7 MMOL/L (ref 3–18)
AST SERPL-CCNC: 68 U/L (ref 10–38)
BILIRUB SERPL-MCNC: 1.1 MG/DL (ref 0.2–1)
BUN SERPL-MCNC: 29 MG/DL (ref 7–18)
BUN/CREAT SERPL: 25 (ref 12–20)
CALCIUM SERPL-MCNC: 9.4 MG/DL (ref 8.5–10.1)
CHLORIDE SERPL-SCNC: 102 MMOL/L (ref 100–111)
CHOLEST SERPL-MCNC: 199 MG/DL
CO2 SERPL-SCNC: 28 MMOL/L (ref 21–32)
CREAT SERPL-MCNC: 1.14 MG/DL (ref 0.6–1.3)
GLOBULIN SER CALC-MCNC: 4.6 G/DL (ref 2–4)
GLUCOSE SERPL-MCNC: 88 MG/DL (ref 74–99)
HBA1C MFR BLD: 5.8 % (ref 4.2–5.6)
HDLC SERPL-MCNC: 68 MG/DL (ref 40–60)
HDLC SERPL: 2.9 {RATIO} (ref 0–5)
LDLC SERPL CALC-MCNC: 112.8 MG/DL (ref 0–100)
LIPID PROFILE,FLP: ABNORMAL
POTASSIUM SERPL-SCNC: 4.4 MMOL/L (ref 3.5–5.5)
PROT SERPL-MCNC: 8.8 G/DL (ref 6.4–8.2)
PSA SERPL-MCNC: 2.1 NG/ML (ref 0–4)
SODIUM SERPL-SCNC: 137 MMOL/L (ref 136–145)
TRIGL SERPL-MCNC: 91 MG/DL (ref ?–150)
VLDLC SERPL CALC-MCNC: 18.2 MG/DL

## 2020-01-03 PROCEDURE — 84153 ASSAY OF PSA TOTAL: CPT

## 2020-01-03 PROCEDURE — 80053 COMPREHEN METABOLIC PANEL: CPT

## 2020-01-03 PROCEDURE — 80061 LIPID PANEL: CPT

## 2020-01-03 PROCEDURE — 83036 HEMOGLOBIN GLYCOSYLATED A1C: CPT

## 2020-01-03 RX ORDER — VARENICLINE TARTRATE 25 MG
KIT ORAL
Qty: 1 DOSE PACK | Refills: 0 | Status: SHIPPED | OUTPATIENT
Start: 2020-01-03 | End: 2021-03-17

## 2020-01-03 RX ORDER — OMEPRAZOLE 20 MG/1
20 CAPSULE, DELAYED RELEASE ORAL DAILY
Qty: 90 CAP | Refills: 1 | Status: SHIPPED | OUTPATIENT
Start: 2020-01-03 | End: 2021-03-10

## 2020-01-03 RX ORDER — SILDENAFIL 100 MG/1
100 TABLET, FILM COATED ORAL AS NEEDED
Qty: 9 TAB | Refills: 5 | Status: SHIPPED | OUTPATIENT
Start: 2020-01-03 | End: 2021-10-04 | Stop reason: SDUPTHER

## 2020-01-03 RX ORDER — ATORVASTATIN CALCIUM 20 MG/1
20 TABLET, FILM COATED ORAL DAILY
Qty: 90 TAB | Refills: 1 | Status: SHIPPED | OUTPATIENT
Start: 2020-01-03 | End: 2020-11-16

## 2020-01-03 RX ORDER — HYDROCHLOROTHIAZIDE 25 MG/1
25 TABLET ORAL DAILY
Qty: 90 TAB | Refills: 1 | Status: SHIPPED | OUTPATIENT
Start: 2020-01-03 | End: 2020-11-16

## 2020-01-03 RX ORDER — AMLODIPINE BESYLATE 10 MG/1
10 TABLET ORAL DAILY
Qty: 90 TAB | Refills: 1 | Status: SHIPPED | OUTPATIENT
Start: 2020-01-03 | End: 2020-08-10

## 2020-01-03 NOTE — PROGRESS NOTES
Rm;11    Chief Complaint   Patient presents with    Hypertension    Pain (Chronic)     back     Depression Screening:  3 most recent PHQ Screens 1/3/2020 7/12/2019   Little interest or pleasure in doing things Not at all Not at all   Feeling down, depressed, irritable, or hopeless Not at all Not at all   Total Score PHQ 2 0 0       Learning Assessment:  Learning Assessment 7/12/2019 4/29/2015   PRIMARY LEARNER Patient Patient   HIGHEST LEVEL OF EDUCATION - PRIMARY LEARNER  GRADUATED HIGH SCHOOL OR GED -   PRIMARY LANGUAGE ENGLISH ENGLISH   LEARNER PREFERENCE PRIMARY READING DEMONSTRATION   ANSWERED BY patient pt   RELATIONSHIP SELF SELF       Abuse Screening:  No flowsheet data found. Health Maintenance reviewed and discussed per provider: yes     Coordination of Care:    1. Have you been to the ER, urgent care clinic since your last visit? Hospitalized since your last visit? no    2. Have you seen or consulted any other health care providers outside of the [address] [place] [address] since your last visit? Include any pap smears or colon screening.  no

## 2020-01-03 NOTE — PROGRESS NOTES
HISTORY OF PRESENT ILLNESS  Gregoria Newman is a 62 y.o. male. HPI  Presents for routine f/u.  1) HTN - consistently borderline-controlled. 2) HLD - started Lipitor 20 mg after last visit. No c/o. Lab Results   Component Value Date/Time    Cholesterol, total 231 (H) 07/12/2019 02:55 PM    HDL Cholesterol 69 (H) 07/12/2019 02:55 PM    LDL, calculated 137.4 (H) 07/12/2019 02:55 PM    VLDL, calculated 24.6 07/12/2019 02:55 PM    Triglyceride 123 07/12/2019 02:55 PM    CHOL/HDL Ratio 3.3 07/12/2019 02:55 PM     3) Prediabetes -   Lab Results   Component Value Date/Time    Hemoglobin A1c 6.1 (H) 07/12/2019 02:55 PM     4) Chronic LBP - relatively unchanged. - Reports prn Diclofenac and Flexeril are not helping back much. - Admits to chronic intermittent bilateral great toe tingling. He notices this when walking around for awhile. Also notices it at night some. - No hx back surgery. No injury or trauma. - Hx x-ray years ago - was told arthritis. 5) Colon cancer screen - appears 4/2015. Reports he believes due to repeat 4-5 years. Review of Systems   Musculoskeletal: Positive for back pain. No radiation. Neurological: Positive for tingling (jody great toes). Negative for dizziness and headaches. Visit Vitals  BP (!) 134/91 (BP 1 Location: Right arm, BP Patient Position: Sitting)   Pulse 79   Temp 98.5 °F (36.9 °C) (Oral)   Resp 18   Ht 6' 4\" (1.93 m)   Wt 236 lb (107 kg)   SpO2 97%   BMI 28.73 kg/m²     Wt Readings from Last 3 Encounters:   01/03/20 236 lb (107 kg)   07/12/19 235 lb 6.4 oz (106.8 kg)   09/26/18 228 lb 6.4 oz (103.6 kg)       Physical Exam  Constitutional:       General: He is not in acute distress. Appearance: Normal appearance. He is well-developed. HENT:      Head: Normocephalic and atraumatic.       Right Ear: Tympanic membrane, ear canal and external ear normal.      Left Ear: Tympanic membrane, ear canal and external ear normal.      Nose: Nose normal. Mouth/Throat:      Mouth: Mucous membranes are moist.      Pharynx: Uvula midline. No oropharyngeal exudate or posterior oropharyngeal erythema. Tonsils: No tonsillar abscesses. Eyes:      General: No scleral icterus. Conjunctiva/sclera: Conjunctivae normal.      Pupils: Pupils are equal, round, and reactive to light. Neck:      Musculoskeletal: Neck supple. Cardiovascular:      Rate and Rhythm: Normal rate and regular rhythm. Pulses: Normal pulses. Dorsalis pedis pulses are 2+ on the right side and 2+ on the left side. Posterior tibial pulses are 2+ on the right side and 2+ on the left side. Heart sounds: Normal heart sounds. No murmur. No gallop. Pulmonary:      Effort: Pulmonary effort is normal. No respiratory distress. Breath sounds: Normal breath sounds. No decreased breath sounds, wheezing, rhonchi or rales. Musculoskeletal:        Back:       Right lower leg: No edema. Left lower leg: No edema. Lymphadenopathy:      Head:      Right side of head: No submandibular or tonsillar adenopathy. Left side of head: No submandibular or tonsillar adenopathy. Cervical: No cervical adenopathy. Upper Body:      Right upper body: No supraclavicular adenopathy. Left upper body: No supraclavicular adenopathy. Skin:     General: Skin is warm and dry. Neurological:      Mental Status: He is alert and oriented to person, place, and time. Psychiatric:         Speech: Speech normal.         ASSESSMENT and PLAN  Diagnoses and all orders for this visit:    1. Essential hypertension  -     hydroCHLOROthiazide (HYDRODIURIL) 25 mg tablet; Take 1 Tab by mouth daily. -     amLODIPine (NORVASC) 10 mg tablet; Take 1 Tab by mouth daily.   - Dosage increase.  -     METABOLIC PANEL, COMPREHENSIVE; Future    2. Hyperlipidemia, unspecified hyperlipidemia type  -     atorvastatin (LIPITOR) 20 mg tablet; Take 1 Tab by mouth daily.  -     LIPID PANEL;  Future   - Nonfasting. 3. Prediabetes  -     HEMOGLOBIN A1C W/O EAG; Future    4. Smoking  -     varenicline (CHANTIX STARTER LIDYA) 0.5 mg (11)- 1 mg (42) DsPk; Take as directed. - Usage / AE's discussed. - 7 min dedicated to smoking cessation. 5. Erectile dysfunction, unspecified erectile dysfunction type  -     sildenafil citrate (VIAGRA) 100 mg tablet; Take 1 Tab by mouth as needed for Erectile Dysfunction.   - Hx of taking. Desires to resume. Counseled to always disclose to emergency personnel. 6. Gastroesophageal reflux disease, esophagitis presence not specified  -     omeprazole (PRILOSEC) 20 mg capsule; Take 1 Cap by mouth daily. 7. Chronic midline low back pain without sciatica  -     XR SPINE LUMB 2 OR 3 V; Future  -     REFERRAL TO PHYSICAL THERAPY  - Trial PT for improved pain control. 8. Prostate cancer screening  -     PSA SCREENING (SCREENING); Future      Follow-up and Dispositions    · Return in about 4 months (around 5/3/2020) for follow-up.

## 2020-01-06 NOTE — PROGRESS NOTES
Please contact patient regarding the followin) Elevated liver enzymes - increased from previous. Is he taking any new OTC supplements, drinking more EtOH, taking high doses of Tylenol? I would like to recheck these in 1 month. I am putting orders in for this. 2) LDL is a little better than last time but less so than I would expect. Is he taking his Lipitor every day? 3) Protein levels are a little elevated, but this was investigated in 2018 and was negative and appears stable. 4) Prediabetes is stable.

## 2020-01-07 NOTE — PROGRESS NOTES
Attempted to contact pt at  number, no answer. Lvm for pt to return call to office at [phone] . Will continue to try to contact pt.

## 2020-01-07 NOTE — PROGRESS NOTES
Spoke with patient and 2 patient identifiers was confirmed. Patient was given results below and verbalized understanding . Patient has no questions/concerns  at this time. Patient is drinking more and he was advised to cut back on the EtOH and to come back in a month to have labs re-checked.

## 2020-01-14 ENCOUNTER — HOSPITAL ENCOUNTER (OUTPATIENT)
Dept: PHYSICAL THERAPY | Age: 60
Discharge: HOME | End: 2020-01-14
Payer: MEDICAID

## 2020-01-14 PROCEDURE — 97162 PT EVAL MOD COMPLEX 30 MIN: CPT | Performed by: PHYSICAL THERAPIST

## 2020-01-14 NOTE — PROGRESS NOTES
PT  EVAL AND TREATMENT    Patient Name: Antonina Combs. Date:2020  : 1960  [x]  Patient  Verified  Payor: [facility] MEDICAID / Plan: [place]  / Product Type: Managed Care Medicaid /    In time:305  Out time:404  Total Treatment Time (min): 59min  Total Timed Codes (min): 59  1:1 Treatment Time ( [address] only): 61   Visit #: 1 of     Treatment Area: Chronic lower back pain [M54.5, G89.29]  Pain in: 5    Objective evaluation:  Physical Therapy Evaluation - Lumbar Spine ([facility])    SUBJECTIVE  Chief Complaint: Pt c/o low back pain with occasional R hip and ant thigh pain. Pt reports occasional B LE numbness/tingling. Pt reports chronic LBP starting 10-15yrs ago for unknown etiology. Recent x-rays reveal: Degenerative changes with facet changes worst at L4/L5 and L5/S1, prominent osteophytes at multi-levels. Pain levels: 0-6, av. Pain occurs across low back mainly, per pt report. Pain increases in am when first arising, carrying items in arms. Prolonged walking varies: some days increases pain, other days decreases. Walking tolerance : 30 min. Prior treatments include PT many years ago, pt reports pain decreased for a while. Pt reports sleep disturbances due to pain.    OBJECTIVE  Posture:  Lateral Shift: [] R    [] L     [] +  [x] -  Kyphosis: [] Increased [] Decreased   [x]  WNL  Lordosis:  [] Increased [x] Decreased   [] WNL  Pelvic symmetry: [x] WNL    [] Other:    Gait:  [x] Normal     [] Abnormal:    Active Movements: [] N/A   [] Too acute   [] Other:  ROM % AROM % PROM Comments:pain, area   Forward flexion 40-60 75% mid shin  Mild pain   Extension 20-30 80%     SB right 20-30 WNL     SB left 20-30 WNL     Rotation right 5-10 75%  Tight QL   Rotation left 5-10 75%  Tight QL   Neuro Screen [x] WNL  Slump Test: [x] R    [x] L    [x] +    [] -  @ (degrees):   Prone Knee Bend: [] R    [] L    [] +    [x] -   Palpation  [] Min  [x] Mod  [] Severe    Location: TTP in l/s ps mm, and B QL mm, PA glides of L2-L5. Strength   L(0-5) R (0-5) N/T   Hip Flexion (L1,2) 4+ 4+ []   Knee Extension (L3,4) 5 4+ []   Ankle Dorsiflexion (L4) 5 5 []   Great Toe Extension (L5)   []   Ankle Plantarflexion (S1) 4 4 []   Knee Flexion (S1,2) 5 4+ []   Upper Abdominals 3 3 []   Lower Abdominals 3 3 []   Paraspinals   []   Back Rotators   []   Gluteus Perez 4 4- []   Hip ABD 4 4    Other Bridge  100% 100% []     Special Tests  Hip: Francisco Javier:  [x] R    [] L    [x] +    [] -     Scour:  [] R    [] L    [] +    [x] -     Piriformis: [] R    [] L    [] +    [x] -   Deficits:     Dennis: [x] R    [x] L    [x] +    [] -     Hamstrings 90/90:B +    Gastrocsoleus (to neutral): Right: WNL Left: WNL       Global Muscular Weakness:  Abdominals: + unable to lift shld blades  Quadratus Lumborum: +      Justification for Eval Code Complexity:  Patient History : HTN  Examination see exam as above   Clinical Presentation: evolving  Clinical Decision Making : FOTO : 67 /100      Modality (rationale): decrease mm tension in l/s  []  Ice pack _  min     [x] Hot pack 10_  min     [] Paraffin _  min      Patient Education: [x] Established HEP    [x] POT (minutes) :15 min HEP    Pain Level (0-10 scale) post treatment: 4  ASSESSMENT  [x]  See Plan of Care    PLAN  [x]  Upgrade activities as tolerated     [x] Other:_ POC  Patient to be seen 2 /wk for 8-10 treatments.        Coni Toledo, PT 1/14/2020  9:48 AM

## 2020-01-14 NOTE — PROGRESS NOTES
[address] [facility] AT [address] [place], [facility]  Phone: [phone] Fax: [phone] / [address] [address] PHYSICAL THERAPY SERVICES  Patient Name: Rock Torres. : 1960   Medical   Diagnosis: Chronic lower back pain [M54.5, G89.29] Treatment Diagnosis: L/s pain   Onset Date: chronic     Referral Source: [facility]): 2020   Prior Hospitalization: See medical history Provider #: [id number]   Prior Level of Function:  indep   Comorbidities: HTN   Medications: Verified on Patient Summary List   The Plan of Care and following information is based on the information from the initial evaluation.   ========================================================================  Assessment / key information:  Patient is a 62 y.o. male who presents to [facility] with Dx of l/s pain. SUBJECTIVE  Chief Complaint: Pt c/o low back pain with occasional R hip and ant thigh pain. Pt reports occasional B LE numbness/tingling. Pt reports chronic LBP starting 10-15yrs ago for unknown etiology. Recent x-rays reveal: Degenerative changes with facet changes worst at L4/L5 and L5/S1, prominent osteophytes at multi-levels. Pain levels: 0-6, av. Pain occurs across low back mainly, per pt report. Pain increases in am when first arising, carrying items in arms. Prolonged walking varies: some days increases pain, other days decreases. Walking tolerance : 30 min. Prior treatments include PT many years ago, pt reports pain decreased for a while. Pt reports sleep disturbances due to pain.    OBJECTIVE  Posture:  Lateral Shift: [] R    [] L     [] +  [x] -  Kyphosis: [] Increased [] Decreased   [x]  WNL  Lordosis:  [] Increased [x] Decreased   [] WNL  Pelvic symmetry: [x] WNL    [] Other:    Gait:  [x] Normal     [] Abnormal:    Active Movements: [] N/A   [] Too acute   [] Other:  ROM % AROM % PROM Comments:pain, area   Forward flexion 40-60 75% mid shin  Mild pain   Extension 20-30 80%     SB right 20-30 WNL     SB left 20-30 WNL     Rotation right 5-10 75%  Tight QL   Rotation left 5-10 75%  Tight QL   Neuro Screen [x] WNL  Slump Test: [x] R    [x] L    [x] +    [] -  @ (degrees):   Prone Knee Bend: [] R    [] L    [] +    [x] -   Palpation  [] Min  [x] Mod  [] Severe    Location: TTP in l/s ps mm, and B QL mm, PA glides of L2-L5. Strength   L(0-5) R (0-5) N/T   Hip Flexion (L1,2) 4+ 4+ []   Knee Extension (L3,4) 5 4+ []   Ankle Dorsiflexion (L4) 5 5 []   Great Toe Extension (L5)   []   Ankle Plantarflexion (S1) 4 4 []   Knee Flexion (S1,2) 5 4+ []   Upper Abdominals 3 3 []   Lower Abdominals 3 3 []   Paraspinals   []   Back Rotators   []   Gluteus Perez 4 4- []   Hip ABD 4 4    Other Bridge  100% 100% []     Special Tests  Hip: Francisco Javier:  [x] R    [] L    [x] +    [] -     Scour:  [] R    [] L    [] +    [x] -     Piriformis: [] R    [] L    [] +    [x] -   Deficits:     Dennis: [x] R    [x] L    [x] +    [] -     Hamstrings 90/90:B +    Gastrocsoleus (to neutral): Right: WNL Left: WNL       Global Muscular Weakness:  Abdominals: + unable to lift shld blades  Quadratus Lumborum: +  Patient scored 67 on FOTO indicating decreased functional activity level and QOL. A home exercise program was demonstrated and provided to address the above objective and functional deficits.  Patient can benefit from PT interventions to improve strength, ROM, flexibility, decrease pain, to facilitate ADLs & overall functional status.   ========================================================================  Eval Complexity: History: MEDIUM  Complexity : 1-2 comorbidities / personal factors will impact the outcome/ POC Exam:MEDIUM Complexity : 3 Standardized tests and measures addressing body structure, function, activity limitation and / or participation in recreation  Presentation: MEDIUM Complexity : Evolving with changing characteristics  Clinical Decision Making:MEDIUM Complexity : FOTO score of 26-74Overall Complexity:MEDIUM  Problem List: pain affecting function, decrease ROM, decrease strength, decrease activity tolerance, decrease flexibility/ joint mobility and decrease transfer abilities   Treatment Plan may include any combination of the following: Therapeutic exercise, Therapeutic activities, Neuromuscular re-education, Physical agent/modality, Gait/balance training, Manual therapy, Aquatic therapy, Patient education, Self Care training, Functional mobility training and Home safety training  Patient / Family readiness to learn indicated by: asking questions, trying to perform skills and interest  Persons(s) to be included in education: patient (P)  Barriers to Learning/Limitations: None  Measures taken:    Patient Goal (s): \"less pain \"   Patient self reported health status: excellent  Rehabilitation Potential: good   Short Term Goals: To be accomplished in  2  weeks:  1. Patient will be compliant with HEP for sx management to address the above listed deficits. .  2. Pt to be educated in use of l/s roll for improved l/s posturing with sitting.  Long Term Goals: To be accomplished in  8-10   treatments:  1. Patient to be independent & compliant with HEP in preparation for D/C.  2. Patient to increase FOTO score to 72 indicating improved functional abilities and QOL. 3. Patient to increase strength in Glut max and hip ABD to 4+ to facilitate l/s stability. 4. Patient to increase HS flexibility to -25deg B to decrease strain to l/s with fwd flexion.      Frequency / Duration:   Patient to be seen  2  times per week for 8-10   treatments:  Patient / Caregiver education and instruction: activity modification and exercises  Therapist Signature: Aline Chow PT Date: 1/67/5085   Certification Period: na Time: 9:49 AM   ========================================================================  I certify that the above Physical Therapy Services are being furnished while the patient is under my care. I agree with the treatment plan and certify that this therapy is necessary. Physician Signature:        Date:       Time:   Please sign and return to [facility] or you may fax the signed copy to [phone]. Thank you. DID YOU CHECK ALLERGIES?  Dinh Meadows

## 2020-01-21 ENCOUNTER — HOSPITAL ENCOUNTER (OUTPATIENT)
Dept: PHYSICAL THERAPY | Age: 60
Discharge: HOME | End: 2020-01-21
Payer: MEDICAID

## 2020-01-21 PROCEDURE — 97140 MANUAL THERAPY 1/> REGIONS: CPT | Performed by: PHYSICAL THERAPIST

## 2020-01-21 PROCEDURE — 97110 THERAPEUTIC EXERCISES: CPT | Performed by: PHYSICAL THERAPIST

## 2020-01-21 NOTE — PROGRESS NOTES
PT DAILY TREATMENT NOTE     Patient Name: Antonina Combs. Date:2020  : 1960  [x]  Patient  Verified  Payor: [facility] MEDICAID / Plan: [address] 46 / Product Type: Managed Care Medicaid /    In time:545pm  Out time:645pm  Total Treatment Time (min): 60  Total Timed Codes (min): 55  1:1 Treatment Time (min): na   Visit #: 2 of 8-10    Treatment Area: Chronic lower back pain [M54.5, G89.29]    SUBJECTIVE  Pain Level (0-10 scale): 1  Any medication changes, allergies to medications, adverse drug reactions, diagnosis change, or new procedure performed?: [x] No    [] Yes (see summary sheet for update)  Subjective functional status/changes:   [] No changes reported  I get some sharp pains when I turn in bed. I did a little of my HEP , I was so tired after work.      OBJECTIVE  Modality rationale: decrease inflammation, decrease pain and increase tissue extensibility to improve the patients ability to perform functional mobility and improve activity  endurance     Min Type Additional Details    [] Estim: []Att   []Unatt  []TENS instruct                 []IFC  []Premod []NMES                       []Other:  []w/US   []w/ice   []w/heat  Position:  Location:    []  Traction: [] Cervical       []Lumbar                       [] Prone          []Supine                       []Intermittent   []Continuous Lbs:  [] before manual  [] after manual    []  Ultrasound: []Continuous   [] Pulsed                           []1MHz   []3MHz Location:  W/cm2:    []  Iontophoresis with dexamethasone         Location: [] Take home patch   [] In clinic   PD []  Ice     [x]  heat  []  Ice massage Position:  Location:    []  Vasopneumatic Device Pressure: [] lo [] med [] hi   Temp: [] lo [] med [] hi   [] Skin assessment post-treatment:  []intact []redness- no adverse reaction       []redness - adverse reaction:       52/47 min Therapeutic Exercise:  [] See flow sheet :   Rationale: increase ROM, increase strength, improve coordination, improve balance and increase proprioception to improve the patients ability to perform functional mobility and improve activity  endurance      8 min Manual Therapy:  DTM to B QL and l/s ps mm. Rationale: decrease pain, increase ROM, increase tissue extensibility and decrease trigger points to perform functional mobility and improve activity  endurance              x min Patient Education: [x] Review HEP    [] Progressed/Changed HEP based on:   [] positioning   [x] body mechanics   [] transfers   [] heat/ice application        Other Objective/Functional Measures: Initiated POC    Pain Level (0-10 scale) post treatment: 0    ASSESSMENT/Changes in Function: Good tolerance to treatment today with patient req 100% verbal/tactile cueing and demo for proper form/technique with all newly introduced therex. Pt able to perform all therex without pain. Noted tightness in B QL mm with DTM. Patient will continue to benefit from skilled PT services to modify and progress therapeutic interventions, address functional mobility deficits, address ROM deficits, address strength deficits, analyze and address soft tissue restrictions, analyze and cue movement patterns, analyze and modify body mechanics/ergonomics, assess and modify postural abnormalities, address imbalance/dizziness and instruct in home and community integration to attain remaining goals. []  See Plan of Care  []  See progress note/recertification  []  See Discharge Summary         Progress towards goals / Updated goals: · Short Term Goals: To be accomplished in  2  weeks:  1. Patient will be compliant with HEP for sx management to address the above listed deficits. Partial compliance 1/21/20  2. Pt to be educated in use of l/s roll for improved l/s posturing with sitting. · Long Term Goals: To be accomplished in  8-10   treatments:  1.   Patient to be independent & compliant with HEP in preparation for D/C.  2. Patient to increase FOTO score to 72 indicating improved functional abilities and QOL. 3. Patient to increase strength in Glut max and hip ABD to 4+ to facilitate l/s stability. 4. Patient to increase HS flexibility to -25deg B to decrease strain to l/s with fwd flexion.      PLAN  [x]  Upgrade activities as tolerated     [x]  Continue plan of care  []  Update interventions per flow sheet       []  Discharge due to:_  []  Other:_      Nico Locke, PT 1/21/2020  1:10 PM

## 2020-01-22 ENCOUNTER — APPOINTMENT (OUTPATIENT)
Dept: PHYSICAL THERAPY | Age: 60
End: 2020-01-22
Payer: MEDICAID

## 2020-01-23 ENCOUNTER — HOSPITAL ENCOUNTER (OUTPATIENT)
Dept: PHYSICAL THERAPY | Age: 60
Discharge: HOME | End: 2020-01-23
Payer: MEDICAID

## 2020-01-23 PROCEDURE — 97140 MANUAL THERAPY 1/> REGIONS: CPT

## 2020-01-23 PROCEDURE — 97110 THERAPEUTIC EXERCISES: CPT

## 2020-01-23 NOTE — PROGRESS NOTES
PT DAILY TREATMENT NOTE     Patient Name: Kristine Hicks. Date:2020  : 1960  [x]  Patient  Verified  Payor: [facility] MEDICAID / Plan: Quentin 46 / Product Type: Managed Care Medicaid /    In time:5:58  Out time 6:52  Total Treatment Time (min): 54  Total Timed Codes (min): 54  1:1 Treatment Time (min): 54   Visit #: 3 of 8-10    Treatment Area: Chronic lower back pain [M54.5, G89.29]    SUBJECTIVE  Pain Level (0-10 scale): 4  Any medication changes, allergies to medications, adverse drug reactions, diagnosis change, or new procedure performed?: [x] No    [] Yes (see summary sheet for update)  Subjective functional status/changes:   [] No changes reported  No numbness tingling lately, although that is typically intermittent. OBJECTIVE    39 min Therapeutic Exercise:  [x] See flow sheet : initiate clams II today    Rationale: increase ROM, increase strength and improve coordination to improve the patients ability to ambulate, work     15 min Manual Therapy:  DTm to (B) QL and lumbar paraspinals, PA glides , rotation mobs to the lumbar spine; PROM to the R hip in prone with a concurrent R quad stretch    Rationale: decrease pain, increase ROM, increase tissue extensibility and decrease trigger points to improve posture, lifting             x min Patient Education: [x] Review HEP    [x] Progressed/Changed HEP based on: KATYA every 2 hours   [] positioning   [] body mechanics   [] transfers   [] heat/ice application        Other Objective/Functional Measures:     Pain Level (0-10 scale) post treatment: 0    ASSESSMENT/Changes in Function: good response to rotational mobs. When given KATYA pt notes that it's a position of comfort for him. Pt also notes decreased pain with improved posture awareness in sitting.      Patient will continue to benefit from skilled PT services to modify and progress therapeutic interventions, address functional mobility deficits, address ROM deficits, address strength deficits, analyze and address soft tissue restrictions, analyze and cue movement patterns, analyze and modify body mechanics/ergonomics, assess and modify postural abnormalities and instruct in home and community integration to attain remaining goals. []  See Plan of Care  []  See progress note/recertification  []  See Discharge Summary         Progress towards goals / Updated goals: · Short Term Goals: To be accomplished in  2  weeks:  1. Patient will be compliant with HEP for sx management to address the above listed deficits. Partial compliance 1/21/20  2. Pt to be educated in use of l/s roll for improved l/s posturing with sitting.  Discussed postural changes today (1/23/20)  · Long Term Goals: To be accomplished in  8-10   treatments:  1.  Patient to be independent & compliant with HEP in preparation for D/C.  2. Patient to increase FOTO score to 72 indicating improved functional abilities and QOL. 3. Patient to increase strength in Glut max and hip ABD to 4+ to facilitate l/s stability.   4. Patient to increase HS flexibility to -25deg B to decrease strain to l/s with fwd flexion.     PLAN  [x]  Upgrade activities as tolerated     [x]  Continue plan of care  []  Update interventions per flow sheet       []  Discharge due to:_  [x]  Other:assess response to KATYA every 2 hours for pain -management and pain -prevention margaret in the morning _      Ajith Medrano, PT 1/23/2020  6:29 PM    Future Appointments   Date Time Provider Eliecer Elizabeth   1/28/2020  6:00 PM [facility] 1 [facility]   1/30/2020  3:00 PM Amena Rosario PT [facility]

## 2020-01-28 ENCOUNTER — HOSPITAL ENCOUNTER (OUTPATIENT)
Dept: PHYSICAL THERAPY | Age: 60
Discharge: HOME | End: 2020-01-28
Payer: MEDICAID

## 2020-01-28 PROCEDURE — 97140 MANUAL THERAPY 1/> REGIONS: CPT | Performed by: PHYSICAL THERAPIST

## 2020-01-28 PROCEDURE — 97110 THERAPEUTIC EXERCISES: CPT | Performed by: PHYSICAL THERAPIST

## 2020-01-28 NOTE — PROGRESS NOTES
PT DAILY TREATMENT NOTE     Patient Name: Tracy Chavez. Date:2020  : 1960  [x]  Patient  Verified  Payor: [facility] MEDICAID / Plan: Quentin 46 / Product Type: Managed Care Medicaid /    In time: 600  Out time  653  Total Treatment Time (min): 53  Total Timed Codes (min): 48  1:1 Treatment Time (min): na  Visit #: 4 of 8-10    Treatment Area: Chronic lower back pain [M54.5, G89.29]    SUBJECTIVE  Pain Level (0-10 scale): 0  Any medication changes, allergies to medications, adverse drug reactions, diagnosis change, or new procedure performed?: [x] No    [] Yes (see summary sheet for update)  Subjective functional status/changes:   [] No changes reported  Not much pain today, Avg over last 3 day is ~ a 4.  Still stiff in the morning. OBJECTIVE    38/33 min Therapeutic Exercise:  [x] See flow sheet :   Rationale: increase ROM, increase strength and improve coordination to improve the patients ability to ambulate, work     15 min Manual Therapy:  DTm to (B) QL and lumbar paraspinals, PA glides , rotation mobs to the lumbar spine; PROM to the R hip in prone with  R quad stretch    Rationale: decrease pain, increase ROM, increase tissue extensibility and decrease trigger points to improve posture, lifting             x min Patient Education: [x] Review HEP    [x] Progressed/Changed HEP based on: KATYA every 2 hours   [] positioning   [] body mechanics   [] transfers   [] heat/ice application        Other Objective/Functional Measures:    Pt doing well with  KATYA every 2 hours for pain -management and pain -prevention margaret in the morning   TTP at L4 PA glides, R QL mm tension   Pt challenged with clams with A for end range mobility needed with cogwheel quality of movement noted. Pain Level (0-10 scale) post treatment: 0    ASSESSMENT/Changes in Function:    Pt reports NV will be his last due to insurance changing.  DC to be done NV and Pt will get a new script if he continues to have pain once new insurance takes effect. Print HEP if needed NV. Patient will continue to benefit from skilled PT services to modify and progress therapeutic interventions, address functional mobility deficits, address ROM deficits, address strength deficits, analyze and address soft tissue restrictions, analyze and cue movement patterns, analyze and modify body mechanics/ergonomics, assess and modify postural abnormalities and instruct in home and community integration to attain remaining goals. []  See Plan of Care  []  See progress note/recertification  []  See Discharge Summary         Progress towards goals / Updated goals: · Short Term Goals: To be accomplished in  2  weeks:  1. Patient will be compliant with HEP for sx management to address the above listed deficits. Partial compliance 1/21/20  2. Pt to be educated in use of l/s roll for improved l/s posturing with sitting.  Discussed postural changes today (1/23/20)  · Long Term Goals: To be accomplished in  8-10   treatments:  1.  Patient to be independent & compliant with HEP in preparation for D/C. Compliance reported daily 1/28/2020  2. Patient to increase FOTO score to 72 indicating improved functional abilities and QOL. 3. Patient to increase strength in Glut max and hip ABD to 4+ to facilitate l/s stability.   4. Patient to increase HS flexibility to -25deg B to decrease strain to l/s with fwd flexion.     PLAN  [x]  Upgrade activities as tolerated     [x]  Continue plan of care  []  Update interventions per flow sheet       []  Discharge due to:_  [x]  Other:DC DEMETRIO Locke, PT 1/28/2020  6:29 PM    Future Appointments   Date Time Provider Eliecer Elizabeth   1/28/2020  6:00 PM [facility] 1 [facility]   1/30/2020  3:00 PM Kaci Tena, PT [facility]

## 2020-01-30 ENCOUNTER — HOSPITAL ENCOUNTER (OUTPATIENT)
Dept: PHYSICAL THERAPY | Age: 60
Discharge: HOME | End: 2020-01-30
Payer: MEDICAID

## 2020-01-30 PROCEDURE — 97110 THERAPEUTIC EXERCISES: CPT

## 2020-01-30 NOTE — PROGRESS NOTES
PT DAILY TREATMENT NOTE     Patient Name: Neptali Wallis. Date:2020  : 1960  [x]  Patient  Verified  Payor: [facility] MEDICAID / Plan: Tammydelvis 46 / Product Type: Managed Care Medicaid /    In time: 300 Out time  400  Total Treatment Time (min): 60  Visit #: 5 of 8-10    Treatment Area: Chronic lower back pain [M54.5, G89.29]    SUBJECTIVE  Pain Level (0-10 scale): 0  Any medication changes, allergies to medications, adverse drug reactions, diagnosis change, or new procedure performed?: [x] No    [] Yes (see summary sheet for update)  Subjective functional status/changes:   [] No changes reported  Patient reports no noted pain     OBJECTIVE    60 min Therapeutic Exercise:  [x] See flow sheet :including reassessment and FOTO    Rationale: increase ROM, increase strength and improve coordination to improve the patients ability to ambulate, work     PD min Manual Therapy:     Rationale: decrease pain, increase ROM, increase tissue extensibility and decrease trigger points to improve posture, lifting             x min Patient Education: [x] Review HEP - updated pictures      Other Objective/Functional Measures:    Goals assessed for DC  Pain at best 0, at worst 6/10  Subjective % improvement 70%  Objective:   LS AROM WFL all directions   Core/ bridge : 95% no pain   Hip strength : flexion: 3+ B , ABD 3/5 B , extension : 3+ B   HS flexibility - <15 deg lacking extension   Improvements: HEP compliance , postural awareness, movement awareness, walking tolerance: 1-2 hours,   Deficits getting out of bed, carrying objects such as groceries, intermittent sleep disturbance       Pain Level (0-10 scale) post treatment: 0    ASSESSMENT/Changes in Function:    [x]  See Discharge Summary         Progress towards goals / Updated goals:  · Long Term Goals: To be accomplished in  8-10   treatments:  1.  Patient to be independent & compliant with HEP in preparation for D/C.  Goal met - patient reports compliance with HEP   2. Patient to increase FOTO score to 72 indicating improved functional abilities and QOL. Goal not met at 56/100  3. Patient to increase strength in Glut max and hip ABD to 4+ to facilitate l/s stability. Goal not at 3+/5    4. Patient to increase HS flexibility to -25deg B to decrease strain to l/s with fwd flexion.  Goal met - 15 deg lacking extension       PLAN        [x]  Hold chart open for 30 days as patient might be able to return with new insurance     Aba Arevalo, PT 1/30/2020  6:29 PM    Future Appointments   Date Time Provider Eliecer Elizabeth   1/30/2020  3:00 PM Kaci Tena, PT [facility]

## 2020-01-30 NOTE — PROGRESS NOTES
[address] [facility] AT [address], [place], [place]  Phone: [phone] Fax [phone]  PROGRESS NOTE OR DISCHARGE SUMMARY  Patient Name: Jimbo Kerns. : 1960   Treatment/Medical Diagnosis: Chronic lower back pain [M54.5, G89.29]   Referral Source: [facility] [place]     Date of Initial Visit: 2020 Attended Visits: 5 Missed Visits: 0     SUMMARY OF TREATMENT  Patient was being treated for c/o low back pain with occasional R hip and ant thigh pain, occasional B LE numbness/tingling starting 10-15yrs ago for unknown etiology. Treatment included progressive therex for ROM, flexibility, core and hip strength and HEP progression. Manual treatment and modalities PRN ,   CURRENT STATUS  Patient made good progress with PT, but has to cut treatment plan short sec to change in insurance. Patient reports he is trying to get new insurance and may be able to return within the 30 day window that we can keep chart open . HEP was updated in interim. Assessment as follows:  Pain at best 0, at worst 6/10  Subjective % improvement 70%  Objective:   LS AROM WFL all directions   Core/ bridge : 95% no pain   Hip strength : flexion: 3+ B , ABD 3/5 B , extension : 3+ B   HS flexibility - <15 deg lacking extension   Improvements: HEP compliance , postural awareness, movement awareness, walking tolerance: 1-2 hours,   Deficits getting out of bed, carrying objects such as groceries, intermittent sleep disturbance          Progress towards goals / Updated goals:  · Long Term Goals: To be accomplished in  8-10   treatments:  1.  Patient to be independent & compliant with HEP in preparation for D/C. Goal met - patient reports compliance with HEP   2. Patient to increase FOTO score to 72 indicating improved functional abilities and QOL. Goal not met at 56/100  3. Patient to increase strength in Glut max and hip ABD to 4+ to facilitate l/s stability.  Goal not at 3+/5    4. Patient to increase HS flexibility to -25deg B to decrease strain to l/s with fwd flexion. Goal met - 15 deg lacking extension     Upon return   Cont per unmet goals as above    RECOMMENDATIONS  Hold chart open 30 days while pending insurance - will cont 2x 8-12 as needed if patient is able to obtain new insurance   If you have any questions/comments please contact us directly at [phone]. Thank you for allowing us to assist in the care of your patient.   Therapist Signature: Andrea Ordoñez PT Date: 1/30/2020   Reporting Period: NA  Time: 403p

## 2020-03-09 NOTE — PROGRESS NOTES
[address] [facility] AT [address], [place], [place]  Phone: [phone] Fax 21 [phone] SUMMARY  Patient Name: Parveen Killian. : 1960   Treatment/Medical Diagnosis: Chronic lower back pain [M54.5, G89.29]   Referral Source: [place], [place]     Date of Initial Visit: 2020 Attended Visits: 5 Missed Visits: 0   PATIENT DID NOT RETURN WITH NEW INSURANCE IN 30 DAYS   SUMMARY OF TREATMENT  Patient was being treated for c/o low back pain with occasional R hip and ant thigh pain, occasional B LE numbness/tingling starting 10-15yrs ago for unknown etiology. Treatment included progressive therex for ROM, flexibility, core and hip strength and HEP progression. Manual treatment and modalities PRN ,   CURRENT STATUS  Patient made good progress with PT, but has to cut treatment plan short sec to change in insurance. Patient reports he is trying to get new insurance and may be able to return within the 30 day window that we can keep chart open . HEP was updated in interim. Assessment as follows:  Pain at best 0, at worst 6/10  Subjective % improvement 70%  Objective:   LS AROM WFL all directions   Core/ bridge : 95% no pain   Hip strength : flexion: 3+ B , ABD 3/5 B , extension : 3+ B   HS flexibility - <15 deg lacking extension   Improvements: HEP compliance , postural awareness, movement awareness, walking tolerance: 1-2 hours,   Deficits getting out of bed, carrying objects such as groceries, intermittent sleep disturbance          Progress towards goals / Updated goals:  · Long Term Goals: To be accomplished in  8-10   treatments:  1.  Patient to be independent & compliant with HEP in preparation for D/C. Goal met - patient reports compliance with HEP   2. Patient to increase FOTO score to 72 indicating improved functional abilities and QOL. Goal not met at 56/100  3.  Patient to increase strength in Glut max and hip ABD to 4+ to facilitate l/s stability. Goal not at 3+/5    4. Patient to increase HS flexibility to -25deg B to decrease strain to l/s with fwd flexion. Goal met - 15 deg lacking extension         RECOMMENDATIONS  DC sec to non  return   If you have any questions/comments please contact us directly at [phone]. Thank you for allowing us to assist in the care of your patient. Therapist Signature: Niki Andres, PT Date: 1/30/2020   Reporting Period: NA  Time: 403p      NOTE TO PHYSICIAN:  Your patient's insurance requires this discharge note be signed and returned. PLEASE COMPLETE THE ORDERS BELOW AND RETURN TO:  [facility]    ___ I have read the above report and request that my patient be discharged from therapy.      Physician Signature:        Date:       Time:

## 2020-07-23 ENCOUNTER — TELEPHONE (OUTPATIENT)
Dept: INTERNAL MEDICINE CLINIC | Age: 60
End: 2020-07-23

## 2020-08-08 DIAGNOSIS — G89.29 CHRONIC BILATERAL LOW BACK PAIN WITHOUT SCIATICA: ICD-10-CM

## 2020-08-08 DIAGNOSIS — M54.50 CHRONIC BILATERAL LOW BACK PAIN WITHOUT SCIATICA: ICD-10-CM

## 2020-08-08 DIAGNOSIS — I10 ESSENTIAL HYPERTENSION: ICD-10-CM

## 2020-08-10 RX ORDER — AMLODIPINE BESYLATE 10 MG/1
TABLET ORAL
Qty: 90 TAB | Refills: 1 | Status: SHIPPED | OUTPATIENT
Start: 2020-08-10 | End: 2021-03-10 | Stop reason: SDUPTHER

## 2020-08-10 RX ORDER — DICLOFENAC SODIUM 75 MG/1
TABLET, DELAYED RELEASE ORAL
Qty: 180 TAB | Refills: 0 | Status: SHIPPED | OUTPATIENT
Start: 2020-08-10 | End: 2020-11-16

## 2021-03-17 ENCOUNTER — HOSPITAL ENCOUNTER (OUTPATIENT)
Dept: LAB | Age: 61
Discharge: HOME | End: 2021-03-17
Payer: MEDICAID

## 2021-03-17 ENCOUNTER — OFFICE VISIT (OUTPATIENT)
Dept: INTERNAL MEDICINE CLINIC | Age: 61
End: 2021-03-17
Payer: COMMERCIAL

## 2021-03-17 VITALS
HEIGHT: 76 IN | OXYGEN SATURATION: 98 % | TEMPERATURE: 96.6 F | WEIGHT: 234 LBS | SYSTOLIC BLOOD PRESSURE: 140 MMHG | DIASTOLIC BLOOD PRESSURE: 88 MMHG | HEART RATE: 108 BPM | RESPIRATION RATE: 22 BRPM | BODY MASS INDEX: 28.49 KG/M2

## 2021-03-17 DIAGNOSIS — E78.5 HYPERLIPIDEMIA, UNSPECIFIED HYPERLIPIDEMIA TYPE: ICD-10-CM

## 2021-03-17 DIAGNOSIS — I10 ESSENTIAL HYPERTENSION: ICD-10-CM

## 2021-03-17 DIAGNOSIS — R25.2 MUSCLE CRAMPS: ICD-10-CM

## 2021-03-17 DIAGNOSIS — G62.9 PERIPHERAL POLYNEUROPATHY: ICD-10-CM

## 2021-03-17 DIAGNOSIS — R73.03 PREDIABETES: ICD-10-CM

## 2021-03-17 DIAGNOSIS — Z12.5 PROSTATE CANCER SCREENING: ICD-10-CM

## 2021-03-17 DIAGNOSIS — G62.9 PERIPHERAL POLYNEUROPATHY: Primary | ICD-10-CM

## 2021-03-17 LAB
25(OH)D3 SERPL-MCNC: 15.6 NG/ML (ref 30–100)
ALBUMIN SERPL-MCNC: 4.4 G/DL (ref 3.4–5)
ALBUMIN/GLOB SERPL: 1 {RATIO} (ref 0.8–1.7)
ALP SERPL-CCNC: 85 U/L (ref 45–117)
ALT SERPL-CCNC: 74 U/L (ref 16–61)
ANION GAP SERPL CALC-SCNC: 8 MMOL/L (ref 3–18)
AST SERPL-CCNC: 69 U/L (ref 10–38)
BASOPHILS # BLD: 0 K/UL (ref 0–0.1)
BASOPHILS NFR BLD: 0 % (ref 0–2)
BILIRUB SERPL-MCNC: 0.9 MG/DL (ref 0.2–1)
BUN SERPL-MCNC: 14 MG/DL (ref 7–18)
BUN/CREAT SERPL: 15 (ref 12–20)
CALCIUM SERPL-MCNC: 9 MG/DL (ref 8.5–10.1)
CHLORIDE SERPL-SCNC: 101 MMOL/L (ref 100–111)
CHOLEST SERPL-MCNC: 227 MG/DL
CO2 SERPL-SCNC: 27 MMOL/L (ref 21–32)
CREAT SERPL-MCNC: 0.92 MG/DL (ref 0.6–1.3)
CREAT UR-MCNC: 297 MG/DL (ref 30–125)
DIFFERENTIAL METHOD BLD: ABNORMAL
EOSINOPHIL # BLD: 0 K/UL (ref 0–0.4)
EOSINOPHIL NFR BLD: 1 % (ref 0–5)
ERYTHROCYTE [DISTWIDTH] IN BLOOD BY AUTOMATED COUNT: 15.2 % (ref 11.6–14.5)
GLOBULIN SER CALC-MCNC: 4.6 G/DL (ref 2–4)
GLUCOSE SERPL-MCNC: 148 MG/DL (ref 74–99)
HBA1C MFR BLD: 6.3 % (ref 4.2–5.6)
HCT VFR BLD AUTO: 41.6 % (ref 36–48)
HDLC SERPL-MCNC: 92 MG/DL (ref 40–60)
HDLC SERPL: 2.5 {RATIO} (ref 0–5)
HGB BLD-MCNC: 14.1 G/DL (ref 13–16)
LDLC SERPL CALC-MCNC: 121.6 MG/DL (ref 0–100)
LIPID PROFILE,FLP: ABNORMAL
LYMPHOCYTES # BLD: 1.4 K/UL (ref 0.9–3.6)
LYMPHOCYTES NFR BLD: 20 % (ref 21–52)
MCH RBC QN AUTO: 29.7 PG (ref 24–34)
MCHC RBC AUTO-ENTMCNC: 33.9 G/DL (ref 31–37)
MCV RBC AUTO: 87.6 FL (ref 74–97)
MICROALBUMIN UR-MCNC: 6.84 MG/DL (ref 0–3)
MICROALBUMIN/CREAT UR-RTO: 23 MG/G (ref 0–30)
MONOCYTES # BLD: 0.6 K/UL (ref 0.05–1.2)
MONOCYTES NFR BLD: 9 % (ref 3–10)
NEUTS SEG # BLD: 5 K/UL (ref 1.8–8)
NEUTS SEG NFR BLD: 70 % (ref 40–73)
PLATELET # BLD AUTO: 219 K/UL (ref 135–420)
PMV BLD AUTO: 10 FL (ref 9.2–11.8)
POTASSIUM SERPL-SCNC: 4 MMOL/L (ref 3.5–5.5)
PROT SERPL-MCNC: 9 G/DL (ref 6.4–8.2)
PSA SERPL-MCNC: 2 NG/ML (ref 0–4)
RBC # BLD AUTO: 4.75 M/UL (ref 4.7–5.5)
SODIUM SERPL-SCNC: 136 MMOL/L (ref 136–145)
TRIGL SERPL-MCNC: 67 MG/DL (ref ?–150)
TSH SERPL DL<=0.05 MIU/L-ACNC: 1.57 UIU/ML (ref 0.36–3.74)
VIT B12 SERPL-MCNC: 556 PG/ML (ref 211–911)
VLDLC SERPL CALC-MCNC: 13.4 MG/DL
WBC # BLD AUTO: 7 K/UL (ref 4.6–13.2)

## 2021-03-17 PROCEDURE — 85025 COMPLETE CBC W/AUTO DIFF WBC: CPT

## 2021-03-17 PROCEDURE — 83036 HEMOGLOBIN GLYCOSYLATED A1C: CPT

## 2021-03-17 PROCEDURE — 80061 LIPID PANEL: CPT

## 2021-03-17 PROCEDURE — 84165 PROTEIN E-PHORESIS SERUM: CPT

## 2021-03-17 PROCEDURE — 80053 COMPREHEN METABOLIC PANEL: CPT

## 2021-03-17 PROCEDURE — 82607 VITAMIN B-12: CPT

## 2021-03-17 PROCEDURE — 82085 ASSAY OF ALDOLASE: CPT

## 2021-03-17 PROCEDURE — 84166 PROTEIN E-PHORESIS/URINE/CSF: CPT

## 2021-03-17 PROCEDURE — 82306 VITAMIN D 25 HYDROXY: CPT

## 2021-03-17 PROCEDURE — 82550 ASSAY OF CK (CPK): CPT

## 2021-03-17 PROCEDURE — 84153 ASSAY OF PSA TOTAL: CPT

## 2021-03-17 PROCEDURE — 36415 COLL VENOUS BLD VENIPUNCTURE: CPT

## 2021-03-17 PROCEDURE — 99214 OFFICE O/P EST MOD 30 MIN: CPT | Performed by: PHYSICIAN ASSISTANT

## 2021-03-17 PROCEDURE — 84443 ASSAY THYROID STIM HORMONE: CPT

## 2021-03-17 PROCEDURE — 82043 UR ALBUMIN QUANTITATIVE: CPT

## 2021-03-17 NOTE — PROGRESS NOTES
HISTORY OF PRESENT ILLNESS  Cornelius Bangura Jr. is a 60 y.o. male.  HPI  Presents for skin itching over his hands and arms x several months. Also c/o pins and needles in his jody feet/toes. Lastly, c/o cramps in his legs, arms, and hands.   - Applies lotion to his legs daily. This does not help. Denies presence of rash.   - Hx of prediabetes.  - Hx of HTN - reasonably controlled.    Hx of hyperglobulinemia. Negative for suspicious (monoclonal-looking) bands.    Review of Systems   Respiratory: Positive for shortness of breath (\"sometimes\" - with exertion - unchanged).    Cardiovascular: Negative for chest pain and claudication.   Musculoskeletal: Negative for back pain.   Skin: Positive for itching. Negative for rash.   Neurological: Positive for tingling (jody feet). Negative for focal weakness and weakness.     Visit Vitals  BP (!) 140/88 (BP 1 Location: Left upper arm, BP Patient Position: Sitting, BP Cuff Size: Large adult) Comment: manual   Pulse (!) 108   Temp (!) 96.6 °F (35.9 °C) (Oral)   Resp 22   Ht 6' 4\" (1.93 m)   Wt 234 lb (106.1 kg)   SpO2 98%   BMI 28.48 kg/m²       Physical Exam  Constitutional:       General: He is not in acute distress.     Appearance: Normal appearance. He is well-developed.   HENT:      Head: Normocephalic and atraumatic.      Right Ear: Tympanic membrane, ear canal and external ear normal.      Left Ear: Tympanic membrane, ear canal and external ear normal.      Nose: Nose normal.      Mouth/Throat:      Comments: Mask  Eyes:      General: No scleral icterus.     Conjunctiva/sclera: Conjunctivae normal.      Pupils: Pupils are equal, round, and reactive to light.   Neck:      Musculoskeletal: Neck supple.   Cardiovascular:      Rate and Rhythm: Normal rate and regular rhythm.      Pulses: Normal pulses.           Dorsalis pedis pulses are 2+ on the right side and 2+ on the left side.        Posterior tibial pulses are 2+ on the right side and 2+ on the left side.      Heart sounds:  Normal heart sounds. No murmur. No gallop. Pulmonary:      Effort: Pulmonary effort is normal. No respiratory distress. Breath sounds: Normal breath sounds. No decreased breath sounds, wheezing, rhonchi or rales. Musculoskeletal:      Right lower leg: No edema. Left lower leg: No edema. Right foot: Normal range of motion. No deformity. Left foot: Normal range of motion. No deformity. Feet:      Right foot:      Skin integrity: Skin integrity normal.      Left foot:      Skin integrity: Skin integrity normal.      Comments: Good sensation to monofilament testing jody feet. Lymphadenopathy:      Head:      Right side of head: No submandibular or tonsillar adenopathy. Left side of head: No submandibular or tonsillar adenopathy. Cervical: No cervical adenopathy. Upper Body:      Right upper body: No supraclavicular adenopathy. Left upper body: No supraclavicular adenopathy. Skin:     General: Skin is warm and dry. Neurological:      Mental Status: He is alert and oriented to person, place, and time. Psychiatric:         Speech: Speech normal.         ASSESSMENT and PLAN  Diagnoses and all orders for this visit:    1. Peripheral polyneuropathy  -     CBC WITH AUTOMATED DIFF; Future  -     METABOLIC PANEL, COMPREHENSIVE; Future  -     TSH 3RD GENERATION; Future  -     VITAMIN D, 25 HYDROXY; Future  -     PROTEIN ELECT & YANCY, UR, RANDOM; Future  -     PROTEIN ELECTROPHORESIS W/ REFLX YANCY; Future    2. Essential hypertension  -     MICROALBUMIN, UR, RAND W/ MICROALB/CREAT RATIO; Future    3. Muscle cramps  -     CK; Future  -     ALDOLASE; Future    4. Prediabetes  -     HEMOGLOBIN A1C W/O EAG; Future    5. Hyperlipidemia, unspecified hyperlipidemia type  -     LIPID PANEL; Future    6. Prostate cancer screening  -     PSA SCREENING (SCREENING); Future      Follow-up and Dispositions    · Return in about 1 week (around 3/24/2021) for f/u results.

## 2021-03-17 NOTE — Clinical Note
Please add CPK and aldolase to patient's lab he completed yesterday. I placed the orders for these. Thank you.

## 2021-03-17 NOTE — LETTER
NOTIFICATION RETURN TO WORK / SCHOOL 
 
3/17/2021 11:32 AM 
 
[address] [facility] [phone] To Whom It May Concern: Brandi Multani. is currently under the care of Ha Weber. Please excuse his work absence today for his appointment. If there are questions or concerns please have the patient contact our office.  
 
 
 
Sincerely, 
 
 
FRANK Devries

## 2021-03-17 NOTE — PROGRESS NOTES
Lupe Guerra is a 56 y.o. male (: 1960) presenting to address:    Chief Complaint   Patient presents with    Skin Problem     patient c/o itching all over for 4-5 months       Vitals:    21 1100   BP: (!) 140/88   Pulse: (!) 108   Resp: 22   Temp: (!) 96.6 °F (35.9 °C)   TempSrc: Oral   SpO2: 98%   Weight: 234 lb (106.1 kg)   Height: 6' 4\" (1.93 m)   PainSc:   0 - No pain       Hearing/Vision:   No exam data present    Learning Assessment:     Learning Assessment 2019   PRIMARY LEARNER Patient   HIGHEST LEVEL OF EDUCATION - PRIMARY LEARNER  GRADUATED HIGH SCHOOL OR GED   PRIMARY LANGUAGE ENGLISH   LEARNER PREFERENCE PRIMARY READING   ANSWERED BY patient   RELATIONSHIP SELF     Depression Screening:     3 most recent PHQ Screens 3/17/2021   Little interest or pleasure in doing things Not at all   Feeling down, depressed, irritable, or hopeless Not at all   Total Score PHQ 2 0     Fall Risk Assessment:     Fall Risk Assessment, last 12 mths 3/17/2021   Able to walk? Yes   Fall in past 12 months? 0     Abuse Screening:   No flowsheet data found. Coordination of Care Questionaire:   1. Have you been to the ER, urgent care clinic since your last visit? Hospitalized since your last visit? NO    2. Have you seen or consulted any other health care providers outside of the [address] [place] [address] since your last visit? Include any pap smears or colon screening. NO    Advanced Directive:   1. Do you have an Advanced Directive? NO    2. Would you like information on Advanced Directives?  NO

## 2021-03-18 LAB
ALBUMIN 24H MFR UR ELPH: 41.1 %
ALPHA1 GLOB 24H MFR UR ELPH: 2.9 %
ALPHA2 GLOB 24H MFR UR ELPH: 11 %
B-GLOBULIN MFR UR ELPH: 25.1 %
CK SERPL-CCNC: 262 U/L (ref 39–308)
GAMMA GLOB 24H MFR UR ELPH: 19.9 %
INTERPRETATION UR IFE-IMP: NORMAL
M PROTEIN 24H MFR UR ELPH: NORMAL %
NOTE, 149533: NORMAL
PROT UR-MCNC: 56.4 MG/DL

## 2021-03-19 LAB
ALBUMIN SERPL ELPH-MCNC: 4.2 G/DL (ref 2.9–4.4)
ALBUMIN/GLOB SERPL: 0.9 {RATIO} (ref 0.7–1.7)
ALDOLASE SERPL-CCNC: 7.3 U/L (ref 3.3–10.3)
ALPHA1 GLOB SERPL ELPH-MCNC: 0.3 G/DL (ref 0–0.4)
ALPHA2 GLOB SERPL ELPH-MCNC: 0.8 G/DL (ref 0.4–1)
B-GLOBULIN SERPL ELPH-MCNC: 1.3 G/DL (ref 0.7–1.3)
GAMMA GLOB SERPL ELPH-MCNC: 2.2 G/DL (ref 0.4–1.8)
GLOBULIN SER CALC-MCNC: 4.5 G/DL (ref 2.2–3.9)
M PROTEIN SERPL ELPH-MCNC: ABNORMAL G/DL
PROT PATTERN SERPL ELPH-IMP: ABNORMAL
PROT SERPL-MCNC: 8.7 G/DL (ref 6–8.5)

## 2021-03-24 ENCOUNTER — OFFICE VISIT (OUTPATIENT)
Dept: INTERNAL MEDICINE CLINIC | Age: 61
End: 2021-03-24
Payer: MEDICAID

## 2021-03-24 ENCOUNTER — HOSPITAL ENCOUNTER (OUTPATIENT)
Dept: LAB | Age: 61
Discharge: HOME | End: 2021-03-24
Payer: MEDICAID

## 2021-03-24 VITALS
HEART RATE: 89 BPM | SYSTOLIC BLOOD PRESSURE: 130 MMHG | TEMPERATURE: 96.4 F | RESPIRATION RATE: 20 BRPM | OXYGEN SATURATION: 98 % | DIASTOLIC BLOOD PRESSURE: 83 MMHG | WEIGHT: 231 LBS | HEIGHT: 76 IN | BODY MASS INDEX: 28.13 KG/M2

## 2021-03-24 DIAGNOSIS — G62.9 PERIPHERAL POLYNEUROPATHY: Primary | ICD-10-CM

## 2021-03-24 DIAGNOSIS — G57.93 NEUROPATHY OF BOTH FEET: ICD-10-CM

## 2021-03-24 DIAGNOSIS — M79.632 PAIN IN BOTH FOREARMS: ICD-10-CM

## 2021-03-24 DIAGNOSIS — R79.89 ELEVATED LFTS: ICD-10-CM

## 2021-03-24 DIAGNOSIS — M79.631 PAIN IN BOTH FOREARMS: ICD-10-CM

## 2021-03-24 DIAGNOSIS — E55.9 VITAMIN D DEFICIENCY: ICD-10-CM

## 2021-03-24 DIAGNOSIS — R73.03 PREDIABETES: ICD-10-CM

## 2021-03-24 DIAGNOSIS — M79.604 PAIN IN BOTH LOWER EXTREMITIES: ICD-10-CM

## 2021-03-24 DIAGNOSIS — M79.605 PAIN IN BOTH LOWER EXTREMITIES: ICD-10-CM

## 2021-03-24 LAB — GGT SERPL-CCNC: 209 U/L (ref 15–85)

## 2021-03-24 PROCEDURE — 36415 COLL VENOUS BLD VENIPUNCTURE: CPT

## 2021-03-24 PROCEDURE — 82977 ASSAY OF GGT: CPT

## 2021-03-24 PROCEDURE — 99214 OFFICE O/P EST MOD 30 MIN: CPT | Performed by: PHYSICIAN ASSISTANT

## 2021-03-24 PROCEDURE — 87340 HEPATITIS B SURFACE AG IA: CPT

## 2021-03-24 PROCEDURE — 86704 HEP B CORE ANTIBODY TOTAL: CPT

## 2021-03-24 PROCEDURE — 86803 HEPATITIS C AB TEST: CPT

## 2021-03-24 RX ORDER — GABAPENTIN 100 MG/1
100 CAPSULE ORAL
Qty: 30 CAP | Refills: 2 | Status: SHIPPED | OUTPATIENT
Start: 2021-03-24 | End: 2022-02-15

## 2021-03-24 RX ORDER — ERGOCALCIFEROL 1.25 MG/1
50000 CAPSULE ORAL
Qty: 12 CAP | Refills: 2 | Status: SHIPPED | OUTPATIENT
Start: 2021-03-24 | End: 2021-09-08 | Stop reason: SDUPTHER

## 2021-03-24 NOTE — LETTER
NOTIFICATION RETURN TO WORK / SCHOOL 
 
3/24/2021 10:25 AM 
 
[address] [facility] [phone] To Whom It May Concern: Chito Woods. is currently under the care of Ha Weber. Please excuse Mr. Chacha Fontaine' absence today for his appointment. If there are questions or concerns please have the patient contact our office.  
 
 
 
Sincerely, 
 
 
FRANK Clemens

## 2021-03-24 NOTE — PROGRESS NOTES
Jeanine Lentz is a 56 y.o. male (: 1960) presenting to address:    Chief Complaint   Patient presents with    Results     patient here today to discuss lab results. Vitals:    21 0958   BP: 130/83   Pulse: 89   Resp: 20   Temp: (!) 96.4 °F (35.8 °C)   TempSrc: Oral   SpO2: 98%   Weight: 231 lb (104.8 kg)   Height: 6' 4\" (1.93 m)   PainSc:   0 - No pain       Hearing/Vision:   No exam data present    Learning Assessment:     Learning Assessment 2019   PRIMARY LEARNER Patient   HIGHEST LEVEL OF EDUCATION - PRIMARY LEARNER  GRADUATED HIGH SCHOOL OR GED   PRIMARY LANGUAGE ENGLISH   LEARNER PREFERENCE PRIMARY READING   ANSWERED BY patient   RELATIONSHIP SELF     Depression Screening:     3 most recent PHQ Screens 3/24/2021   Little interest or pleasure in doing things Not at all   Feeling down, depressed, irritable, or hopeless Not at all   Total Score PHQ 2 0     Fall Risk Assessment:     Fall Risk Assessment, last 12 mths 3/17/2021   Able to walk? Yes   Fall in past 12 months? 0     Abuse Screening:   No flowsheet data found. Coordination of Care Questionaire:   1. Have you been to the ER, urgent care clinic since your last visit? Hospitalized since your last visit? NO    2. Have you seen or consulted any other health care providers outside of the [address] [place] [address] since your last visit? Include any pap smears or colon screening. NO    Advanced Directive:   1. Do you have an Advanced Directive? NO    2. Would you like information on Advanced Directives?  NO

## 2021-03-24 NOTE — PROGRESS NOTES
HISTORY OF PRESENT ILLNESS  Roberto March is a 56 y.o. male. HPI  Presents for f/u lab results. See previous note. C/o jody feet paresthesias (pins and needles) x several months. C/o jody arm and leg aches. C/o body wide pruritus. This latter does improve with skin moisturization. No rashes. Labs reviewed:     1) Vit D deficiency - 15.6    2) Elevated LFT's - Admits EtOH consumption is higher than it should be. Admits to 2-3 drinks of liquor/day. 3) Prediabetes - A1C 6.3%. 4) HTN - controlled. 5) HLD - taking Lipitor 20 mg daily. Chronic and present prior to symptom onset (started 8/12/19). Hospital Outpatient Visit on 03/17/2021   Component Date Value Ref Range Status    WBC 03/17/2021 7.0  4.6 - 13.2 K/uL Final    RBC 03/17/2021 4.75  4.70 - 5.50 M/uL Final    HGB 03/17/2021 14.1  13.0 - 16.0 g/dL Final    HCT 03/17/2021 41.6  36.0 - 48.0 % Final    MCV 03/17/2021 87.6  74.0 - 97.0 FL Final    MCH 03/17/2021 29.7  24.0 - 34.0 PG Final    MCHC 03/17/2021 33.9  31.0 - 37.0 g/dL Final    RDW 03/17/2021 15.2* 11.6 - 14.5 % Final    PLATELET 44/29/9756 503  135 - 420 K/uL Final    MPV 03/17/2021 10.0  9.2 - 11.8 FL Final    NEUTROPHILS 03/17/2021 70  40 - 73 % Final    LYMPHOCYTES 03/17/2021 20* 21 - 52 % Final    MONOCYTES 03/17/2021 9  3 - 10 % Final    EOSINOPHILS 03/17/2021 1  0 - 5 % Final    BASOPHILS 03/17/2021 0  0 - 2 % Final    ABS. NEUTROPHILS 03/17/2021 5.0  1.8 - 8.0 K/UL Final    ABS. LYMPHOCYTES 03/17/2021 1.4  0.9 - 3.6 K/UL Final    ABS. MONOCYTES 03/17/2021 0.6  0.05 - 1.2 K/UL Final    ABS. EOSINOPHILS 03/17/2021 0.0  0.0 - 0.4 K/UL Final    ABS.  BASOPHILS 03/17/2021 0.0  0.0 - 0.1 K/UL Final    DF 03/17/2021 AUTOMATED    Final    Sodium 03/17/2021 136  136 - 145 mmol/L Final    Potassium 03/17/2021 4.0  3.5 - 5.5 mmol/L Final    Chloride 03/17/2021 101  100 - 111 mmol/L Final    CO2 03/17/2021 27  21 - 32 mmol/L Final    Anion gap 03/17/2021 8  3.0 - 18 mmol/L Final    Glucose 03/17/2021 148* 74 - 99 mg/dL Final    BUN 03/17/2021 14  7.0 - 18 MG/DL Final    Creatinine 03/17/2021 0.92  0.6 - 1.3 MG/DL Final    BUN/Creatinine ratio 03/17/2021 15  12 - 20   Final    GFR est AA 03/17/2021 >60  >60 ml/min/1.73m2 Final    GFR est non-AA 03/17/2021 >60  >60 ml/min/1.73m2 Final    Comment: (NOTE)  Estimated GFR is calculated using the Modification of Diet in Renal   Disease (MDRD) Study equation, reported for both  Americans   (GFRAA) and non- Americans (GFRNA), and normalized to 1.73m2   body surface area. The physician must decide which value applies to   the patient. The MDRD study equation should only be used in   individuals age 25 or older. It has not been validated for the   following: pregnant women, patients with serious comorbid conditions,   or on certain medications, or persons with extremes of body size,   muscle mass, or nutritional status.  Calcium 03/17/2021 9.0  8.5 - 10.1 MG/DL Final    Bilirubin, total 03/17/2021 0.9  0.2 - 1.0 MG/DL Final    ALT (SGPT) 03/17/2021 74* 16 - 61 U/L Final    AST (SGOT) 03/17/2021 69* 10 - 38 U/L Final    Alk. phosphatase 03/17/2021 85  45 - 117 U/L Final    Protein, total 03/17/2021 9.0* 6.4 - 8.2 g/dL Final    Albumin 03/17/2021 4.4  3.4 - 5.0 g/dL Final    Globulin 03/17/2021 4.6* 2.0 - 4.0 g/dL Final    A-G Ratio 03/17/2021 1.0  0.8 - 1.7   Final    Hemoglobin A1c 03/17/2021 6.3* 4.2 - 5.6 % Final    Comment: (NOTE)  HbA1C Interpretive Ranges  <5.7              Normal  5.7 - 6.4         Consider Prediabetes  >6.5              Consider Diabetes      LIPID PROFILE 03/17/2021        Final    Cholesterol, total 03/17/2021 227* <200 MG/DL Final    Triglyceride 03/17/2021 67  <150 MG/DL Final    Comment: The drugs N-acetylcysteine (NAC) and  Metamiszole have been found to cause falsely  low results in this chemical assay.  Please  be sure to submit blood samples obtained  BEFORE administration of either of these  drugs to assure correct results.  HDL Cholesterol 03/17/2021 92* 40 - 60 MG/DL Final    LDL, calculated 03/17/2021 121.6* 0 - 100 MG/DL Final    VLDL, calculated 03/17/2021 13.4  MG/DL Final    CHOL/HDL Ratio 03/17/2021 2.5  0 - 5.0   Final    Microalbumin,urine random 03/17/2021 6.84* 0 - 3.0 MG/DL Final    Creatinine, urine 03/17/2021 297.00* 30 - 125 mg/dL Final    Microalbumin/Creat ratio (mg/g cre* 03/17/2021 23  0 - 30 mg/g Final    TSH 03/17/2021 1.57  0.36 - 3.74 uIU/mL Final    Vitamin D 25-Hydroxy 03/17/2021 15.6* 30 - 100 ng/mL Final    Comment: (NOTE)  Deficiency               <20 ng/mL  Insufficiency          20-30 ng/mL  Sufficient             ng/mL  Possible toxicity       >100 ng/mL    The Method used is Siemens Advia Centaur currently standardized to a   Center of Disease Control and Prevention (CDC) certified reference   [facility]. Samples containing fluorescein dye can produce falsely   elevated values when tested with the ADVIA Centaur Vitamin D Assay. It is recommended that results in the toxic range, >100 ng/mL, be   retested 72 hours post fluorescein exposure.  Protein, urine random 03/17/2021 56.4  Not Estab. mg/dL Final    Albumin, urine 03/17/2021 41.1  % Final    Alpha-1-Globulin, urine 03/17/2021 2.9  % Final    Alpha-2-Globulin, urine 03/17/2021 11.0  % Final    Beta-globulin, urine 03/17/2021 25.1  % Final    Gamma Globulin, urine 03/17/2021 19.9  % Final    M-Facundo, % 03/17/2021 Not Observed  Not Observed % Final    Immunofixation Result, urine 03/17/2021 Comment    Final    Comment: (NOTE)  The immunofixation pattern appears unremarkable. Evidence of  monoclonal protein is not apparent.  Note 03/17/2021 Comment    Final    Comment: (NOTE)  Protein electrophoresis scan will follow via computer, mail, or   delivery.   Performed At: [address] [place] [id number]  Adalberto Gaucher MD BK:[id number]      Protein, total 03/17/2021 8.7* 6.0 - 8.5 g/dL Final    Albumin 03/17/2021 4.2  2.9 - 4.4 g/dL Final    Alpha-1-globulin 03/17/2021 0.3  0.0 - 0.4 g/dL Final    ALPHA-2 GLOBULIN 03/17/2021 0.8  0.4 - 1.0 g/dL Final    Beta globulin 03/17/2021 1.3  0.7 - 1.3 g/dL Final    Gamma globulin 03/17/2021 2.2* 0.4 - 1.8 g/dL Final    M-Facundo 03/17/2021 Not Observed  Not Observed g/dL Final    Globulin, total 03/17/2021 4.5* 2.2 - 3.9 g/dL Final    A/G ratio 03/17/2021 0.9  0.7 - 1.7   Final    Interpretation (see below) 03/17/2021 Comment    Final    Comment: (NOTE)  The SPE pattern reflects a polyclonal increase in gamma globulin. Hypergammaglobulinemia is found in a wide variety of infectious,  non-infectious, and autoimmune disease states. Evidence of  monoclonal protein is not apparent. Performed At: [address] [id number]  Vera Zapata MD ED:[id number]      Prostate Specific Ag 03/17/2021 2.0  0.0 - 4.0 ng/mL Final    Vitamin B12 03/17/2021 556  211 - 911 pg/mL Final    Aldolase 03/17/2021 7.3  3.3 - 10.3 U/L Final    Comment: (NOTE)  [facility] [address] [id number]  Vera Zapata MD :[id number]      CK 03/17/2021 262  39 - 308 U/L Final       Review of Systems   Cardiovascular: Negative for claudication and leg swelling. Musculoskeletal: Positive for myalgias. Skin: Positive for itching. Negative for rash. Neurological: Positive for tingling (jody feet). Visit Vitals  /83 (BP 1 Location: Right upper arm, BP Patient Position: Sitting, BP Cuff Size: Large adult)   Pulse 89   Temp (!) 96.4 °F (35.8 °C) (Oral)   Resp 20   Ht 6' 4\" (1.93 m)   Wt 231 lb (104.8 kg)   SpO2 98%   BMI 28.12 kg/m²       Physical Exam  Constitutional:       General: He is not in acute distress. Appearance: Normal appearance. He is well-developed. HENT:      Head: Normocephalic and atraumatic. Right Ear: Tympanic membrane, ear canal and external ear normal.      Left Ear: Tympanic membrane, ear canal and external ear normal.      Nose: Nose normal.      Mouth/Throat:      Comments: Mask  Eyes:      General: No scleral icterus. Conjunctiva/sclera: Conjunctivae normal.      Pupils: Pupils are equal, round, and reactive to light. Neck:      Musculoskeletal: Neck supple. Cardiovascular:      Rate and Rhythm: Normal rate and regular rhythm. Pulses: Normal pulses. Dorsalis pedis pulses are 2+ on the right side and 2+ on the left side. Posterior tibial pulses are 2+ on the right side and 2+ on the left side. Heart sounds: Normal heart sounds. No murmur. No gallop. Pulmonary:      Effort: Pulmonary effort is normal. No respiratory distress. Breath sounds: Normal breath sounds. No decreased breath sounds, wheezing, rhonchi or rales. Musculoskeletal:      Right lower leg: No edema. Left lower leg: No edema. Lymphadenopathy:      Head:      Right side of head: No submandibular or tonsillar adenopathy. Left side of head: No submandibular or tonsillar adenopathy. Cervical: No cervical adenopathy. Upper Body:      Right upper body: No supraclavicular adenopathy. Left upper body: No supraclavicular adenopathy. Skin:     General: Skin is warm and dry. Findings: No rash. Neurological:      Mental Status: He is alert and oriented to person, place, and time. Psychiatric:         Speech: Speech normal.         ASSESSMENT and PLAN  Diagnoses and all orders for this visit:    1. Peripheral polyneuropathy  -     gabapentin (NEURONTIN) 100 mg capsule; Take 1 Cap by mouth nightly. Max Daily Amount: 100 mg. Indications: neuropathic pain  -     REFERRAL TO NEUROLOGY    2. Neuropathy of both feet  -     REFERRAL TO NEUROLOGY  - Will give trial of Gabapentin. Will consult with neurology for consideration of NCS.      3. Pain in both lower extremities  4. Pain in both forearms  5. Vitamin D deficiency  -     ergocalciferol (Vitamin D2) 1,250 (50,000 unit) capsule; Take 1 Cap by mouth every seven (7) days.  - Goal of symptomatic improvement with Vit D replenishment. 6. Elevated LFTs  -     GGT; Future  -     HEP B SURFACE AG; Future  -     HEPATITIS B CORE AB, TOTAL; Future  -     HEPATITIS C AB; Future  -     US LIVER; Future   - [facility] - faxed. - Decrease EtOH consumption. He agrees. Will monitor. 7. Prediabetes  - Dietary modifications discussed. Need to monitor. Follow-up and Dispositions    · Return in about 2 months (around 5/24/2021) for follow-up neuropathy, arm/leg pain.          Patient Instructions   [id number]

## 2021-03-25 LAB
HBV SURFACE AG SER QL: <0.1 INDEX
HBV SURFACE AG SER QL: NEGATIVE
HCV AB SER IA-ACNC: 0.07 INDEX
HCV AB SERPL QL IA: NEGATIVE
HCV COMMENT,HCGAC: NORMAL

## 2021-03-26 LAB — HBV CORE AB SERPL QL IA: NEGATIVE

## 2021-04-02 ENCOUNTER — TELEPHONE (OUTPATIENT)
Dept: INTERNAL MEDICINE CLINIC | Age: 61
End: 2021-04-02

## 2021-04-05 NOTE — TELEPHONE ENCOUNTER
Patient was given results from the letter on 3/26/21. Patient verbally understood and had no other questions.

## 2021-04-06 ENCOUNTER — HOSPITAL ENCOUNTER (OUTPATIENT)
Dept: ULTRASOUND IMAGING | Age: 61
Discharge: HOME | End: 2021-04-06
Attending: PHYSICIAN ASSISTANT
Payer: COMMERCIAL

## 2021-04-06 DIAGNOSIS — R79.89 ELEVATED LFTS: ICD-10-CM

## 2021-04-06 PROCEDURE — 76705 ECHO EXAM OF ABDOMEN: CPT

## 2021-06-25 ENCOUNTER — TELEPHONE (OUTPATIENT)
Dept: INTERNAL MEDICINE CLINIC | Age: 61
End: 2021-06-25

## 2021-06-25 NOTE — LETTER
7/7/2021 8:51 AM    Mr. Shashi Fernandez   [phone]  [phone]    Please call the office at [phone]            Sincerely,      FRANK Muñoz/ rhona

## 2021-06-25 NOTE — TELEPHONE ENCOUNTER
Left msg on vm to call office regarding neurology referral.  He declined appt with [facility]ne because of transportation. He has [facility] neurology offices are not taking. He can call his insurance to schedule transportation. He needs to call neurology back to schedule appt at [phone].

## 2021-07-15 ENCOUNTER — OFFICE VISIT (OUTPATIENT)
Dept: INTERNAL MEDICINE CLINIC | Age: 61
End: 2021-07-15
Payer: COMMERCIAL

## 2021-07-15 VITALS
OXYGEN SATURATION: 97 % | HEIGHT: 76 IN | WEIGHT: 225 LBS | DIASTOLIC BLOOD PRESSURE: 79 MMHG | BODY MASS INDEX: 27.4 KG/M2 | TEMPERATURE: 97 F | RESPIRATION RATE: 18 BRPM | SYSTOLIC BLOOD PRESSURE: 120 MMHG

## 2021-07-15 DIAGNOSIS — G62.9 PERIPHERAL POLYNEUROPATHY: ICD-10-CM

## 2021-07-15 DIAGNOSIS — L50.2 URTICARIA DUE TO HEAT: Primary | ICD-10-CM

## 2021-07-15 PROCEDURE — 99213 OFFICE O/P EST LOW 20 MIN: CPT | Performed by: NURSE PRACTITIONER

## 2021-07-15 RX ORDER — LORATADINE 10 MG/1
10 TABLET ORAL DAILY
Qty: 30 TABLET | Refills: 1 | Status: SHIPPED | OUTPATIENT
Start: 2021-07-15 | End: 2021-09-08

## 2021-07-15 NOTE — PROGRESS NOTES
ROOM # 2  Identified pt with two pt identifiers(name and ). Reviewed record in preparation for visit and have obtained necessary documentation. Chief Complaint   Patient presents with    Neurologic Problem     jody arms     Skin Problem      Aurelia Diggs. preferred language for health care discussion is english/other. Is the patient using any DME equipment during OV? NO    Aurelia Diggs. is due for:  Health Maintenance Due   Topic    Pneumococcal 0-64 years (1 of 2 - PPSV23)    COVID-19 Vaccine (1)    DTaP/Tdap/Td series (1 - Tdap)    Shingrix Vaccine Age 50> (1 of 2)     Health Maintenance reviewed and discussed per provider  Please order/place referral if appropriate. Advance Directive:  1. Do you have an advance directive in place? Patient Reply: NO    2. If not, would you like material regarding how to put one in place? NO    Coordination of Care:  1. Have you been to the ER, urgent care clinic since your last visit? Hospitalized since your last visit? NO    2. Have you seen or consulted any other health care providers outside of the [address] [place] [address] since your last visit? Include any pap smears or colon screening. NO    Patient is accompanied by self I have received verbal consent from Aurelia Diggs. to discuss any/all medical information while they are present in the room.     Learning Assessment:  Learning Assessment 2019   PRIMARY LEARNER Patient Patient   HIGHEST LEVEL OF EDUCATION - PRIMARY LEARNER  GRADUATED HIGH SCHOOL OR GED -   PRIMARY LANGUAGE ENGLISH ENGLISH   LEARNER PREFERENCE PRIMARY READING DEMONSTRATION   ANSWERED BY patient pt   RELATIONSHIP SELF SELF     Depression Screening:  3 most recent [facility] Screens 3/24/2021 3/17/2021 1/3/2020 2019   Little interest or pleasure in doing things Not at all Not at all Not at all Not at all   Feeling down, depressed, irritable, or hopeless Not at all Not at all Not at all Not at all   Total Score PHQ 2 0 0 0 0     Fall Risk  Fall Risk Assessment, last 12 mths 3/17/2021   Able to walk? Yes   Fall in past 12 months? 0     Recent Travel Screening and Travel History documentation     Travel Screening     Question   Response    In the last month, have you been in contact with someone who was confirmed or suspected to have Coronavirus / COVID-19? No / Unsure    Have you had a COVID-19 viral test in the last 14 days? No    Do you have any of the following new or worsening symptoms? Have you traveled internationally or domestically in the last month?   No      Travel History   Travel since 06/15/21     No documented travel since 06/15/21

## 2021-07-15 NOTE — PROGRESS NOTES
HISTORY OF PRESENT ILLNESS  Trae Guevara is a 56 y.o. male. Here for skin itching and arm and leg cramps. HPI  1) Skin itching - from head to toe - denies Hx of allergies - itches when he sweats \" only when he sweats\"  - denies bumps or rashes. 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 He states his legs are better when he is at home. Shooting pain on and off in legs and arms allover - feels muscle are weak sometimes. He does report he drinks alcohol. Was referred to Neurology but did not follow up. /79 (BP 1 Location: Left upper arm, BP Patient Position: Sitting)   Temp 97 °F (36.1 °C) (Temporal)   Resp 18   Ht 6' 4\" (1.93 m)   Wt 225 lb (102.1 kg)   SpO2 97%   BMI 27.39 kg/m²   Current Outpatient Medications   Medication Sig Dispense Refill    loratadine (Claritin) 10 mg tablet Take 1 Tablet by mouth daily. Indications: hives 30 Tablet 1    hydroCHLOROthiazide (HYDRODIURIL) 25 mg tablet take 1 tablet by mouth once daily 90 Tablet 0    atorvastatin (LIPITOR) 20 mg tablet take 1 tablet by mouth once daily 90 Tablet 0    diclofenac EC (VOLTAREN) 75 mg EC tablet take 1 tablet by mouth twice a day if needed 180 Tablet 0    omeprazole (PRILOSEC) 20 mg capsule take 1 capsule by mouth once daily 90 Capsule 0    amLODIPine (NORVASC) 10 mg tablet take 1 tablet by mouth once daily 90 Tablet 0    ergocalciferol (Vitamin D2) 1,250 mcg (50,000 unit) capsule Take 1 Cap by mouth every seven (7) days. 12 Cap 2    gabapentin (NEURONTIN) 100 mg capsule Take 1 Cap by mouth nightly. Max Daily Amount: 100 mg. Indications: neuropathic pain 30 Cap 2    sildenafil citrate (VIAGRA) 100 mg tablet Take 1 Tab by mouth as needed for Erectile Dysfunction. 9 Tab 5    cyclobenzaprine (FLEXERIL) 10 mg tablet take 1 tablet by mouth every 8 hours if needed. Caution: Drowsiness. No driving or working with use.  180 Tab 0    HYDROcodone-acetaminophen (NORCO) 7.5-325 mg per tablet Take 1 Tab by mouth every six (6) hours as needed for Pain. Max Daily Amount: 4 Tabs. 45 Tab 0       Review of Systems   Constitutional: Negative for chills, fever and malaise/fatigue. Eyes: Negative for blurred vision and double vision. Respiratory: Negative for cough, shortness of breath and wheezing. Cardiovascular: Negative for chest pain, palpitations and leg swelling. Musculoskeletal: Positive for myalgias. Muscle cramps     Skin: Positive for itching. Neurological: Negative for dizziness and headaches. Physical Exam  Vitals and nursing note reviewed. Constitutional:       General: He is not in acute distress. Appearance: Normal appearance. He is not ill-appearing. HENT:      Head: Normocephalic. Right Ear: External ear normal.      Left Ear: External ear normal.      Mouth/Throat:      Comments: MASK  Eyes:      General: No scleral icterus. Extraocular Movements: Extraocular movements intact. Conjunctiva/sclera: Conjunctivae normal.      Pupils: Pupils are equal, round, and reactive to light. Cardiovascular:      Rate and Rhythm: Normal rate and regular rhythm. Pulmonary:      Effort: Pulmonary effort is normal.      Breath sounds: Normal breath sounds. Musculoskeletal:         General: Normal range of motion. Cervical back: Normal range of motion. Right lower leg: No edema. Left lower leg: No edema. Skin:     General: Skin is warm and dry. Findings: No lesion or rash. Neurological:      General: No focal deficit present. Mental Status: He is alert and oriented to person, place, and time. Psychiatric:         Mood and Affect: Mood normal.         Behavior: Behavior normal.         Thought Content: Thought content normal.         Judgment: Judgment normal.       ASSESSMENT and PLAN  Diagnoses and all orders for this visit:    1. Urticaria due to heat  -     loratadine (Claritin) 10 mg tablet;  Take 1 Tablet by mouth daily. Indications: hives   - ? Allergy related start trial of claritin     2. Peripheral polyneuropathy   - Patient given number to call neurology to follow up on referral      - Recommended Magnesium oxide 250 mg as needed for muscle cramping     Follow-up and Dispositions    · Return in about 1 month (around 8/15/2021) for HTN, CHOL.

## 2021-07-15 NOTE — LETTER
NOTIFICATION RETURN TO WORK / SCHOOL    7/15/2021 12:39 PM    Mr. Laurel [place] [id number]      To Whom It May Concern: Isaiah Erika. is currently under the care of Ha Weber. He will return to work/school on: 07/16/2021    If there are questions or concerns please have the patient contact our office.         Sincerely,      Daphnie Napoles NP

## 2021-09-08 ENCOUNTER — OFFICE VISIT (OUTPATIENT)
Dept: INTERNAL MEDICINE CLINIC | Age: 61
End: 2021-09-08
Payer: COMMERCIAL

## 2021-09-08 VITALS
WEIGHT: 222 LBS | SYSTOLIC BLOOD PRESSURE: 106 MMHG | DIASTOLIC BLOOD PRESSURE: 70 MMHG | HEIGHT: 76 IN | RESPIRATION RATE: 18 BRPM | TEMPERATURE: 96.9 F | BODY MASS INDEX: 27.03 KG/M2 | HEART RATE: 65 BPM | OXYGEN SATURATION: 98 %

## 2021-09-08 DIAGNOSIS — R74.8 ELEVATED LIVER ENZYMES: ICD-10-CM

## 2021-09-08 DIAGNOSIS — H04.129 DRY EYE: ICD-10-CM

## 2021-09-08 DIAGNOSIS — L50.9 URTICARIA: ICD-10-CM

## 2021-09-08 DIAGNOSIS — H53.8 BLURRY VISION: ICD-10-CM

## 2021-09-08 DIAGNOSIS — I10 ESSENTIAL HYPERTENSION: Primary | ICD-10-CM

## 2021-09-08 DIAGNOSIS — Z76.0 MEDICATION REFILL: ICD-10-CM

## 2021-09-08 DIAGNOSIS — Z23 ENCOUNTER FOR IMMUNIZATION: ICD-10-CM

## 2021-09-08 DIAGNOSIS — F10.10 ALCOHOL ABUSE: ICD-10-CM

## 2021-09-08 DIAGNOSIS — E78.5 HYPERLIPIDEMIA, UNSPECIFIED HYPERLIPIDEMIA TYPE: ICD-10-CM

## 2021-09-08 PROCEDURE — 90686 IIV4 VACC NO PRSV 0.5 ML IM: CPT | Performed by: NURSE PRACTITIONER

## 2021-09-08 PROCEDURE — 99214 OFFICE O/P EST MOD 30 MIN: CPT | Performed by: NURSE PRACTITIONER

## 2021-09-08 PROCEDURE — 90471 IMMUNIZATION ADMIN: CPT | Performed by: NURSE PRACTITIONER

## 2021-09-08 RX ORDER — AMLODIPINE BESYLATE 10 MG/1
10 TABLET ORAL DAILY
Qty: 90 TABLET | Refills: 1 | Status: SHIPPED | OUTPATIENT
Start: 2021-09-08 | End: 2022-02-15 | Stop reason: SDUPTHER

## 2021-09-08 RX ORDER — ERGOCALCIFEROL 1.25 MG/1
50000 CAPSULE ORAL
Qty: 12 CAPSULE | Refills: 2 | Status: SHIPPED | OUTPATIENT
Start: 2021-09-08

## 2021-09-08 RX ORDER — ATORVASTATIN CALCIUM 20 MG/1
20 TABLET, FILM COATED ORAL DAILY
Qty: 90 TABLET | Refills: 1 | Status: SHIPPED | OUTPATIENT
Start: 2021-09-08 | End: 2022-02-15 | Stop reason: SDUPTHER

## 2021-09-08 NOTE — LETTER
NOTIFICATION RETURN TO WORK / SCHOOL    9/8/2021 11:33 AM    Mr. [place] [phone]      To Whom It May Concern: Samuel Pollack. is currently under the care of Ha Weber. He will return to work/school on: 09/09/2021    If there are questions or concerns please have the patient contact our office.         Sincerely,      Isacc Horne NP

## 2021-09-08 NOTE — PROGRESS NOTES
ROOM # 1  Identified pt with two pt identifiers(name and ). Reviewed record in preparation for visit and have obtained necessary documentation. Chief Complaint   Patient presents with    Hypertension     f./u    Cholesterol Problem     f/u      Yrn Storey. preferred language for health care discussion is english/other. Is the patient using any DME equipment during OV? NO    Yrn Storey. is due for:  Health Maintenance Due   Topic    Pneumococcal 0-64 years (1 of 2 - PPSV23)    DTaP/Tdap/Td series (1 - Tdap)    Shingrix Vaccine Age 50> (1 of 2)    Flu Vaccine (1)     Health Maintenance reviewed and discussed per provider  Please order/place referral if appropriate. Advance Directive:  1. Do you have an advance directive in place? Patient Reply: NO    2. If not, would you like material regarding how to put one in place? NO    Coordination of Care:  1. Have you been to the ER, urgent care clinic since your last visit? Hospitalized since your last visit? NO    2. Have you seen or consulted any other health care providers outside of the [address] [place] [address] since your last visit? Include any pap smears or colon screening. NO    Patient is accompanied by self I have received verbal consent from Yrn Storey. to discuss any/all medical information while they are present in the room.     Learning Assessment:  Learning Assessment 2019   PRIMARY LEARNER Patient Patient   HIGHEST LEVEL OF EDUCATION - PRIMARY LEARNER  GRADUATED HIGH SCHOOL OR GED -   PRIMARY LANGUAGE ENGLISH ENGLISH   LEARNER PREFERENCE PRIMARY READING DEMONSTRATION   ANSWERED BY patient pt   RELATIONSHIP SELF SELF     Depression Screening:  3 most recent [facility] Screens 2021 3/24/2021 3/17/2021 1/3/2020 2019   Little interest or pleasure in doing things Not at all Not at all Not at all Not at all Not at all   Feeling down, depressed, irritable, or hopeless Not at all Not at all Not at all Not at all Not at all   Total Score PHQ 2 0 0 0 0 0     Fall Risk  Fall Risk Assessment, last 12 mths 3/17/2021   Able to walk? Yes   Fall in past 12 months? 0     Recent Travel Screening and Travel History documentation     Travel Screening     Question   Response    In the last month, have you been in contact with someone who was confirmed or suspected to have Coronavirus / COVID-19? No / Unsure    Have you had a COVID-19 viral test in the last 14 days? No    Do you have any of the following new or worsening symptoms? Have you traveled internationally or domestically in the last month?   No      Travel History   Travel since 08/08/21     No documented travel since 08/08/21

## 2021-09-08 NOTE — PROGRESS NOTES
HISTORY OF PRESENT ILLNESS  Nathaniel Vaz is a 59 y.o. male. For HLD and HTN management. HPI  1) HTN - controlled- amlodipine 10 mg and HCTZ 25 mg      BP Readings from Last 3 Encounters:   09/08/21 106/70   07/15/21 120/79   03/24/21 130/83     2) HLD - taking Lipitor 20 mg daily. Report compliance  Lab Results   Component Value Date/Time    Cholesterol, total 227 (H) 03/17/2021 10:39 AM    HDL Cholesterol 92 (H) 03/17/2021 10:39 AM    LDL, calculated 121.6 (H) 03/17/2021 10:39 AM    VLDL, calculated 13.4 03/17/2021 10:39 AM    Triglyceride 67 03/17/2021 10:39 AM    CHOL/HDL Ratio 2.5 03/17/2021 10:39 AM       3) Urticaria - drinks a pint on the weekends for years -  Has not tried Claritin - itching worse with heat. itching Started a couple months back maybe 6 months. /70 (BP 1 Location: Left upper arm, BP Patient Position: Sitting)   Pulse 65   Temp 96.9 °F (36.1 °C) (Temporal)   Resp 18   Ht 6' 4\" (1.93 m)   Wt 222 lb (100.7 kg)   SpO2 98%   BMI 27.02 kg/m²   Current Outpatient Medications   Medication Sig Dispense Refill    amLODIPine (NORVASC) 10 mg tablet Take 1 Tablet by mouth daily. 90 Tablet 1    atorvastatin (LIPITOR) 20 mg tablet Take 1 Tablet by mouth daily. 90 Tablet 1    ergocalciferol (Vitamin D2) 1,250 mcg (50,000 unit) capsule Take 1 Capsule by mouth every seven (7) days. 12 Capsule 2    hydroCHLOROthiazide (HYDRODIURIL) 25 mg tablet take 1 tablet by mouth once daily 90 Tablet 0    diclofenac EC (VOLTAREN) 75 mg EC tablet take 1 tablet by mouth twice a day if needed 180 Tablet 0    omeprazole (PRILOSEC) 20 mg capsule take 1 capsule by mouth once daily 90 Capsule 0    gabapentin (NEURONTIN) 100 mg capsule Take 1 Cap by mouth nightly. Max Daily Amount: 100 mg. Indications: neuropathic pain 30 Cap 2    sildenafil citrate (VIAGRA) 100 mg tablet Take 1 Tab by mouth as needed for Erectile Dysfunction.  9 Tab 5    cyclobenzaprine (FLEXERIL) 10 mg tablet take 1 tablet by mouth every 8 hours if needed. Caution: Drowsiness. No driving or working with use. 180 Tab 0    HYDROcodone-acetaminophen (NORCO) 7.5-325 mg per tablet Take 1 Tab by mouth every six (6) hours as needed for Pain. Max Daily Amount: 4 Tabs. 45 Tab 0       Review of Systems   Constitutional: Negative for chills, fever and malaise/fatigue. HENT: Positive for congestion. Eyes: Negative for blurred vision and double vision. Respiratory: Positive for cough. Negative for shortness of breath and wheezing. Cardiovascular: Negative for chest pain, palpitations and leg swelling. Musculoskeletal:        Hand cramping    Skin: Positive for itching. Neurological: Negative for dizziness and headaches. Physical Exam  Vitals and nursing note reviewed. Constitutional:       General: He is not in acute distress. Appearance: He is not ill-appearing. HENT:      Head: Normocephalic. Right Ear: External ear normal.      Left Ear: External ear normal.      Mouth/Throat:      Comments: MASK  Eyes:      General: No scleral icterus. Extraocular Movements: Extraocular movements intact. Conjunctiva/sclera: Conjunctivae normal.      Pupils: Pupils are equal, round, and reactive to light. Cardiovascular:      Rate and Rhythm: Normal rate and regular rhythm. Pulses: Normal pulses. Heart sounds: Normal heart sounds. Pulmonary:      Effort: Pulmonary effort is normal. No respiratory distress. Breath sounds: Normal breath sounds. No wheezing. Abdominal:      General: Abdomen is flat. Tenderness: There is no guarding or rebound. Musculoskeletal:         General: Normal range of motion. Cervical back: Normal range of motion. Right lower leg: No edema. Left lower leg: No edema. Lymphadenopathy:      Cervical: No cervical adenopathy. Skin:     General: Skin is warm and dry. Findings: No lesion or rash. Neurological:      General: No focal deficit present. Mental Status: He is alert and oriented to person, place, and time. Mental status is at baseline. Psychiatric:         Mood and Affect: Mood normal.         Behavior: Behavior normal.         Thought Content: Thought content normal.         Judgment: Judgment normal.         ASSESSMENT and PLAN  Diagnoses and all orders for this visit:    1. Essential hypertension  -     amLODIPine (NORVASC) 10 mg tablet; Take 1 Tablet by mouth daily.   - BP controlled continue HCTZ and amlodipine     2. Hyperlipidemia, unspecified hyperlipidemia type  -     atorvastatin (LIPITOR) 20 mg tablet; Take 1 Tablet by mouth daily. - continue Lipitor   3. Encounter for immunization  -     INFLUENZA VIRUS VAC QUAD,SPLIT,PRESV FREE SYRINGE IM    4. Urticaria  -     REFERRAL TO GASTROENTEROLOGY   - ? Liver related due to alcohol abuse and elevated liver enzymes referred to GI for evaluation    - patient to quit drinking     5. Elevated liver enzymes  -     REFERRAL TO GASTROENTEROLOGY    6. Alcohol abuse  -     REFERRAL TO GASTROENTEROLOGY    7. Blurry vision  -     REFERRAL TO OPHTHALMOLOGY   - for eval and treatment   8. Dry eye  -     REFERRAL TO OPHTHALMOLOGY    9. Medication refill  -     ergocalciferol (Vitamin D2) 1,250 mcg (50,000 unit) capsule; Take 1 Capsule by mouth every seven (7) days. Follow-up and Dispositions    · Return in about 3 months (around 12/8/2021) for CHOL, HTN.

## 2021-09-08 NOTE — PROGRESS NOTES
Aurelia Hall is a 59 y.o. male who presents for routine immunizations. He denies any symptoms , reactions or allergies that would exclude them from being immunized today. Risks and adverse reactions were discussed and the VIS was given to them. All questions were addressed. He was observed for 15 min post injection. There were no reactions observed.     Shan Kendrick

## 2021-09-23 ENCOUNTER — OFFICE VISIT (OUTPATIENT)
Dept: INTERNAL MEDICINE CLINIC | Age: 61
End: 2021-09-23
Payer: COMMERCIAL

## 2021-09-23 ENCOUNTER — VIRTUAL VISIT (OUTPATIENT)
Dept: INTERNAL MEDICINE CLINIC | Age: 61
End: 2021-09-23

## 2021-09-23 VITALS
BODY MASS INDEX: 27.28 KG/M2 | WEIGHT: 224 LBS | SYSTOLIC BLOOD PRESSURE: 106 MMHG | HEIGHT: 76 IN | HEART RATE: 84 BPM | TEMPERATURE: 97.4 F | RESPIRATION RATE: 20 BRPM | DIASTOLIC BLOOD PRESSURE: 68 MMHG | OXYGEN SATURATION: 94 %

## 2021-09-23 DIAGNOSIS — J01.90 SUBACUTE SINUSITIS, UNSPECIFIED LOCATION: Primary | ICD-10-CM

## 2021-09-23 PROCEDURE — 99213 OFFICE O/P EST LOW 20 MIN: CPT | Performed by: INTERNAL MEDICINE

## 2021-09-23 RX ORDER — CETIRIZINE HCL 10 MG
10 TABLET ORAL DAILY
Qty: 30 TABLET | Refills: 0 | Status: SHIPPED | OUTPATIENT
Start: 2021-09-23 | End: 2021-11-14

## 2021-09-23 RX ORDER — AZITHROMYCIN 250 MG/1
TABLET, FILM COATED ORAL
Qty: 6 TABLET | Refills: 0 | Status: SHIPPED | OUTPATIENT
Start: 2021-09-23 | End: 2021-09-28

## 2021-09-23 NOTE — LETTER
NOTIFICATION RETURN TO WORK / SCHOOL    9/23/2021 4:30 PM    [facility] [id number]      To Whom It May Concern: Abhilash Bear. is currently under the care of Ha Weber. He will return to work/school on: Sept 24. He was ill on 9/23 and was  seen on that day. If there are questions or concerns please have the patient contact our office.         Sincerely,      Rehan Andersen MD

## 2021-09-23 NOTE — PROGRESS NOTES
Progress Note    Patient: Fabi Cantor Sex: male                  YOB: 1960      Age:  59 y.o.                    HPI:     Fabi Cantor is a 59 y.o. male who has been seen for pressure behind eyes x  A few weeks . He has a history sinus. . He has thick phlegm  Issues with yellow phlegm . He takes nyquil etc  which helps occas    Past Medical History:   Diagnosis Date    Arthritis     Arthritis     Hypertension        Past Surgical History:   Procedure Laterality Date    HX OTHER SURGICAL      I&D hemorrhoids x 2    IR CHOLECYSTOSTOMY PERCUTANEOUS         Family History   Problem Relation Age of Onset    Cancer Mother         colon ca    Alzheimer Father     Cancer Father        Social History     Socioeconomic History    Marital status: SINGLE     Spouse name: Not on file    Number of children: Not on file    Years of education: Not on file    Highest education level: Not on file   Tobacco Use    Smoking status: Current Some Day Smoker     Packs/day: 5.00     Types: Cigarettes, Cigars     Last attempt to quit: 3/1/2020     Years since quittin.5    Smokeless tobacco: Never Used    Tobacco comment: LESS THAN 5 A DAY   Substance and Sexual Activity    Alcohol use: Yes     Alcohol/week: 3.3 standard drinks     Types: 4 Standard drinks or equivalent per week    Drug use: No    Sexual activity: Not Currently     Social Determinants of Health     Financial Resource Strain:     Difficulty of Paying Living Expenses:    Food Insecurity:     Worried About Running Out of Food in the Last Year:     Ran Out of Food in the Last Year:    Transportation Needs:     Lack of Transportation (Medical):      Lack of Transportation (Non-Medical):    Physical Activity:     Days of Exercise per Week:     Minutes of Exercise per Session:    Stress:     Feeling of Stress :    Social Connections:     Frequency of Communication with Friends and Family:     Frequency of Social Gatherings with Friends and Family:     Attends Denominational Services:     Active Member of Clubs or Organizations:     Attends Club or Organization Meetings:     Marital Status:          Current Outpatient Medications:     amLODIPine (NORVASC) 10 mg tablet, Take 1 Tablet by mouth daily. , Disp: 90 Tablet, Rfl: 1    atorvastatin (LIPITOR) 20 mg tablet, Take 1 Tablet by mouth daily. , Disp: 90 Tablet, Rfl: 1    ergocalciferol (Vitamin D2) 1,250 mcg (50,000 unit) capsule, Take 1 Capsule by mouth every seven (7) days. , Disp: 12 Capsule, Rfl: 2    hydroCHLOROthiazide (HYDRODIURIL) 25 mg tablet, take 1 tablet by mouth once daily, Disp: 90 Tablet, Rfl: 0    diclofenac EC (VOLTAREN) 75 mg EC tablet, take 1 tablet by mouth twice a day if needed, Disp: 180 Tablet, Rfl: 0    omeprazole (PRILOSEC) 20 mg capsule, take 1 capsule by mouth once daily, Disp: 90 Capsule, Rfl: 0    gabapentin (NEURONTIN) 100 mg capsule, Take 1 Cap by mouth nightly. Max Daily Amount: 100 mg. Indications: neuropathic pain, Disp: 30 Cap, Rfl: 2    sildenafil citrate (VIAGRA) 100 mg tablet, Take 1 Tab by mouth as needed for Erectile Dysfunction. , Disp: 9 Tab, Rfl: 5    cyclobenzaprine (FLEXERIL) 10 mg tablet, take 1 tablet by mouth every 8 hours if needed. Caution: Drowsiness. No driving or working with use., Disp: 180 Tab, Rfl: 0    HYDROcodone-acetaminophen (NORCO) 7.5-325 mg per tablet, Take 1 Tab by mouth every six (6) hours as needed for Pain. Max Daily Amount: 4 Tabs., Disp: 45 Tab, Rfl: 0     No Known Allergies    Review of Systems   Constitutional: Positive for chills. Negative for fever. Respiratory: Positive for cough, sputum production and shortness of breath. Coughing constantly during exam. Producing greenish yellow phlegm  when he blows his nose. Cardiovascular: Negative for chest pain. Gastrointestinal: Negative. Genitourinary: Negative. Neurological: Negative for dizziness and loss of consciousness. Physical Exam:      Visit Vitals  /68 (BP 1 Location: Left upper arm, BP Patient Position: Sitting, BP Cuff Size: Adult)   Pulse 84   Temp 97.4 °F (36.3 °C) (Temporal)   Resp 20   Ht 6' 4\" (1.93 m)   Wt 224 lb (101.6 kg)   SpO2 94%   BMI 27.27 kg/m²       Physical Exam  Constitutional:       General: He is not in acute distress. Appearance: Normal appearance. HENT:      Nose: No congestion. Eyes:      Extraocular Movements: Extraocular movements intact. Conjunctiva/sclera: Conjunctivae normal.   Cardiovascular:      Rate and Rhythm: Normal rate and regular rhythm. Heart sounds: Normal heart sounds. Pulmonary:      Effort: Pulmonary effort is normal. No respiratory distress. Breath sounds: Normal breath sounds. No stridor. No wheezing or rhonchi. Neurological:      General: No focal deficit present. Mental Status: He is alert and oriented to person, place, and time. Mental status is at baseline. Psychiatric:         Mood and Affect: Mood normal.         Behavior: Behavior normal.         Thought Content: Thought content normal.          Labs Reviewed:      Assessment/Plan       ICD-10-CM ICD-9-CM    1.  Subacute sinusitis, unspecified location  J01.90 461.9 cetirizine (ZYRTEC) 10 mg tablet      azithromycin (ZITHROMAX) 250 mg tablet             Henny Reyes MD

## 2021-09-23 NOTE — PROGRESS NOTES
Samuel Pollack. is a 59 y.o. male (: 1960) presenting to address:    Chief Complaint   Patient presents with    Pressure Behind the Eyes     3-4 weeks       Vitals:    21 1558   BP: 106/68   Pulse: 84   Resp: 20   Temp: 97.4 °F (36.3 °C)   TempSrc: Temporal   SpO2: 94%   Weight: 224 lb (101.6 kg)   Height: 6' 4\" (1.93 m)   PainSc:   0 - No pain       Hearing/Vision:   No exam data present    Learning Assessment:     Learning Assessment 2019   PRIMARY LEARNER Patient   HIGHEST LEVEL OF EDUCATION - PRIMARY LEARNER  GRADUATED HIGH SCHOOL OR GED   PRIMARY LANGUAGE ENGLISH   LEARNER PREFERENCE PRIMARY READING   ANSWERED BY patient   RELATIONSHIP SELF     Depression Screening:     3 most recent PHQ Screens 2021   Little interest or pleasure in doing things Not at all   Feeling down, depressed, irritable, or hopeless Not at all   Total Score PHQ 2 0     Fall Risk Assessment:     Fall Risk Assessment, last 12 mths 3/17/2021   Able to walk? Yes   Fall in past 12 months? 0     Abuse Screening:   No flowsheet data found. Coordination of Care Questionaire:   1. Have you been to the ER, urgent care clinic since your last visit? Hospitalized since your last visit? NO    2. Have you seen or consulted any other health care providers outside of the [address] [place] [address] since your last visit? Include any pap smears or colon screening. NO    Advanced Directive:   1. Do you have an Advanced Directive? NO    2. Would you like information on Advanced Directives?  NO

## 2021-10-04 DIAGNOSIS — N52.9 ERECTILE DYSFUNCTION, UNSPECIFIED ERECTILE DYSFUNCTION TYPE: ICD-10-CM

## 2021-10-04 RX ORDER — SILDENAFIL 100 MG/1
100 TABLET, FILM COATED ORAL AS NEEDED
Qty: 9 TABLET | Refills: 5 | Status: SHIPPED | OUTPATIENT
Start: 2021-10-04 | End: 2022-02-15 | Stop reason: SDUPTHER

## 2021-11-13 DIAGNOSIS — J01.90 SUBACUTE SINUSITIS, UNSPECIFIED LOCATION: ICD-10-CM

## 2021-11-14 RX ORDER — CETIRIZINE HYDROCHLORIDE 10 MG/1
TABLET, FILM COATED ORAL
Qty: 30 TABLET | Refills: 0 | Status: SHIPPED | OUTPATIENT
Start: 2021-11-14

## 2021-12-17 ENCOUNTER — HOSPITAL ENCOUNTER (OUTPATIENT)
Dept: LAB | Age: 61
Discharge: HOME | End: 2021-12-17

## 2022-02-15 ENCOUNTER — HOSPITAL ENCOUNTER (OUTPATIENT)
Dept: LAB | Age: 62
Discharge: HOME | End: 2022-02-15
Payer: COMMERCIAL

## 2022-02-15 ENCOUNTER — OFFICE VISIT (OUTPATIENT)
Dept: INTERNAL MEDICINE CLINIC | Age: 62
End: 2022-02-15
Payer: COMMERCIAL

## 2022-02-15 VITALS
HEIGHT: 76 IN | BODY MASS INDEX: 27.08 KG/M2 | TEMPERATURE: 98.1 F | WEIGHT: 222.4 LBS | SYSTOLIC BLOOD PRESSURE: 106 MMHG | OXYGEN SATURATION: 97 % | HEART RATE: 80 BPM | DIASTOLIC BLOOD PRESSURE: 79 MMHG | RESPIRATION RATE: 18 BRPM

## 2022-02-15 DIAGNOSIS — Z23 ENCOUNTER FOR IMMUNIZATION: ICD-10-CM

## 2022-02-15 DIAGNOSIS — E78.5 HYPERLIPIDEMIA, UNSPECIFIED HYPERLIPIDEMIA TYPE: ICD-10-CM

## 2022-02-15 DIAGNOSIS — I10 ESSENTIAL HYPERTENSION: ICD-10-CM

## 2022-02-15 DIAGNOSIS — N52.9 ERECTILE DYSFUNCTION, UNSPECIFIED ERECTILE DYSFUNCTION TYPE: ICD-10-CM

## 2022-02-15 DIAGNOSIS — M54.50 CHRONIC BILATERAL LOW BACK PAIN WITHOUT SCIATICA: ICD-10-CM

## 2022-02-15 DIAGNOSIS — R73.03 PREDIABETES: ICD-10-CM

## 2022-02-15 DIAGNOSIS — G89.29 CHRONIC BILATERAL LOW BACK PAIN WITHOUT SCIATICA: ICD-10-CM

## 2022-02-15 DIAGNOSIS — I10 ESSENTIAL HYPERTENSION: Primary | ICD-10-CM

## 2022-02-15 DIAGNOSIS — E55.9 VITAMIN D DEFICIENCY: ICD-10-CM

## 2022-02-15 DIAGNOSIS — K21.9 GASTROESOPHAGEAL REFLUX DISEASE: ICD-10-CM

## 2022-02-15 DIAGNOSIS — L50.9 URTICARIA: ICD-10-CM

## 2022-02-15 LAB
25(OH)D3 SERPL-MCNC: 43.2 NG/ML (ref 30–100)
ALBUMIN SERPL-MCNC: 4.2 G/DL (ref 3.4–5)
ALBUMIN/GLOB SERPL: 0.8 {RATIO} (ref 0.8–1.7)
ALP SERPL-CCNC: 82 U/L (ref 45–117)
ALT SERPL-CCNC: 90 U/L (ref 16–61)
ANION GAP SERPL CALC-SCNC: 9 MMOL/L (ref 3–18)
APPEARANCE UR: ABNORMAL
AST SERPL-CCNC: 78 U/L (ref 10–38)
BACTERIA URNS QL MICRO: NEGATIVE /HPF
BASOPHILS # BLD: 0 K/UL (ref 0–0.1)
BASOPHILS NFR BLD: 1 % (ref 0–2)
BILIRUB SERPL-MCNC: 1 MG/DL (ref 0.2–1)
BILIRUB UR QL: ABNORMAL
BUN SERPL-MCNC: 17 MG/DL (ref 7–18)
BUN/CREAT SERPL: 16 (ref 12–20)
CALCIUM SERPL-MCNC: 9.7 MG/DL (ref 8.5–10.1)
CHLORIDE SERPL-SCNC: 97 MMOL/L (ref 100–111)
CHOLEST SERPL-MCNC: 201 MG/DL
CO2 SERPL-SCNC: 28 MMOL/L (ref 21–32)
COLOR UR: ABNORMAL
CREAT SERPL-MCNC: 1.07 MG/DL (ref 0.6–1.3)
DIFFERENTIAL METHOD BLD: ABNORMAL
EOSINOPHIL # BLD: 0.1 K/UL (ref 0–0.4)
EOSINOPHIL NFR BLD: 3 % (ref 0–5)
EPITH CASTS URNS QL MICRO: ABNORMAL /LPF (ref 0–5)
ERYTHROCYTE [DISTWIDTH] IN BLOOD BY AUTOMATED COUNT: 14.6 % (ref 11.6–14.5)
EST. AVERAGE GLUCOSE BLD GHB EST-MCNC: 128 MG/DL
GLOBULIN SER CALC-MCNC: 5.1 G/DL (ref 2–4)
GLUCOSE SERPL-MCNC: 92 MG/DL (ref 74–99)
GLUCOSE UR STRIP.AUTO-MCNC: NEGATIVE MG/DL
HBA1C MFR BLD: 6.1 % (ref 4.2–5.6)
HCT VFR BLD AUTO: 46.4 % (ref 36–48)
HDLC SERPL-MCNC: 88 MG/DL (ref 40–60)
HDLC SERPL: 2.3 {RATIO} (ref 0–5)
HGB BLD-MCNC: 14.6 G/DL (ref 13–16)
HGB UR QL STRIP: NEGATIVE
HYALINE CASTS URNS QL MICRO: ABNORMAL /LPF (ref 0–2)
IMM GRANULOCYTES # BLD AUTO: 0 K/UL (ref 0–0.04)
IMM GRANULOCYTES NFR BLD AUTO: 0 % (ref 0–0.5)
KETONES UR QL STRIP.AUTO: ABNORMAL MG/DL
LDLC SERPL CALC-MCNC: 95.8 MG/DL (ref 0–100)
LEUKOCYTE ESTERASE UR QL STRIP.AUTO: ABNORMAL
LIPID PROFILE,FLP: ABNORMAL
LYMPHOCYTES # BLD: 1.4 K/UL (ref 0.9–3.6)
LYMPHOCYTES NFR BLD: 26 % (ref 21–52)
MCH RBC QN AUTO: 28.8 PG (ref 24–34)
MCHC RBC AUTO-ENTMCNC: 31.5 G/DL (ref 31–37)
MCV RBC AUTO: 91.5 FL (ref 78–100)
MONOCYTES # BLD: 0.7 K/UL (ref 0.05–1.2)
MONOCYTES NFR BLD: 14 % (ref 3–10)
MUCOUS THREADS URNS QL MICRO: ABNORMAL /LPF
NEUTS SEG # BLD: 3 K/UL (ref 1.8–8)
NEUTS SEG NFR BLD: 57 % (ref 40–73)
NITRITE UR QL STRIP.AUTO: POSITIVE
NRBC # BLD: 0 K/UL (ref 0–0.01)
NRBC BLD-RTO: 0 PER 100 WBC
PH UR STRIP: 5.5 [PH] (ref 5–8)
PLATELET # BLD AUTO: 287 K/UL (ref 135–420)
PMV BLD AUTO: 10 FL (ref 9.2–11.8)
POTASSIUM SERPL-SCNC: 3.7 MMOL/L (ref 3.5–5.5)
PROT SERPL-MCNC: 9.3 G/DL (ref 6.4–8.2)
PROT UR STRIP-MCNC: 100 MG/DL
RBC # BLD AUTO: 5.07 M/UL (ref 4.35–5.65)
RBC #/AREA URNS HPF: NEGATIVE /HPF (ref 0–5)
SODIUM SERPL-SCNC: 134 MMOL/L (ref 136–145)
SP GR UR REFRACTOMETRY: 1.02 (ref 1–1.03)
T4 FREE SERPL-MCNC: 0.9 NG/DL (ref 0.7–1.5)
TRIGL SERPL-MCNC: 86 MG/DL (ref ?–150)
TSH SERPL DL<=0.05 MIU/L-ACNC: 1.45 UIU/ML (ref 0.36–3.74)
UROBILINOGEN UR QL STRIP.AUTO: 1 EU/DL (ref 0.2–1)
VLDLC SERPL CALC-MCNC: 17.2 MG/DL
WBC # BLD AUTO: 5.4 K/UL (ref 4.6–13.2)
WBC URNS QL MICRO: ABNORMAL /HPF (ref 0–5)

## 2022-02-15 PROCEDURE — 90715 TDAP VACCINE 7 YRS/> IM: CPT | Performed by: NURSE PRACTITIONER

## 2022-02-15 PROCEDURE — 99214 OFFICE O/P EST MOD 30 MIN: CPT | Performed by: NURSE PRACTITIONER

## 2022-02-15 PROCEDURE — 85025 COMPLETE CBC W/AUTO DIFF WBC: CPT

## 2022-02-15 PROCEDURE — 90732 PPSV23 VACC 2 YRS+ SUBQ/IM: CPT | Performed by: NURSE PRACTITIONER

## 2022-02-15 PROCEDURE — 82306 VITAMIN D 25 HYDROXY: CPT

## 2022-02-15 PROCEDURE — 84439 ASSAY OF FREE THYROXINE: CPT

## 2022-02-15 PROCEDURE — 80053 COMPREHEN METABOLIC PANEL: CPT

## 2022-02-15 PROCEDURE — 36415 COLL VENOUS BLD VENIPUNCTURE: CPT

## 2022-02-15 PROCEDURE — 83036 HEMOGLOBIN GLYCOSYLATED A1C: CPT

## 2022-02-15 PROCEDURE — 81001 URINALYSIS AUTO W/SCOPE: CPT

## 2022-02-15 PROCEDURE — 80061 LIPID PANEL: CPT

## 2022-02-15 RX ORDER — SILDENAFIL 100 MG/1
100 TABLET, FILM COATED ORAL AS NEEDED
Qty: 9 TABLET | Refills: 5 | Status: SHIPPED | OUTPATIENT
Start: 2022-02-15

## 2022-02-15 RX ORDER — AMLODIPINE BESYLATE 10 MG/1
10 TABLET ORAL DAILY
Qty: 90 TABLET | Refills: 1 | Status: SHIPPED | OUTPATIENT
Start: 2022-02-15 | End: 2022-07-21 | Stop reason: SDUPTHER

## 2022-02-15 RX ORDER — CYCLOBENZAPRINE HCL 10 MG
TABLET ORAL
Qty: 180 TABLET | Refills: 2 | Status: SHIPPED | OUTPATIENT
Start: 2022-02-15

## 2022-02-15 RX ORDER — DICLOFENAC SODIUM 75 MG/1
TABLET, DELAYED RELEASE ORAL
Qty: 90 TABLET | Refills: 4 | Status: SHIPPED | OUTPATIENT
Start: 2022-02-15

## 2022-02-15 RX ORDER — ATORVASTATIN CALCIUM 20 MG/1
20 TABLET, FILM COATED ORAL DAILY
Qty: 90 TABLET | Refills: 1 | Status: SHIPPED | OUTPATIENT
Start: 2022-02-15 | End: 2022-07-21 | Stop reason: SDUPTHER

## 2022-02-15 RX ORDER — HYDROCHLOROTHIAZIDE 25 MG/1
25 TABLET ORAL DAILY
Qty: 90 TABLET | Refills: 3 | Status: SHIPPED | OUTPATIENT
Start: 2022-02-15 | End: 2022-04-12 | Stop reason: SDUPTHER

## 2022-02-15 RX ORDER — GABAPENTIN 100 MG/1
100 CAPSULE ORAL
Qty: 30 CAPSULE | Refills: 2 | Status: CANCELLED | OUTPATIENT
Start: 2022-02-15

## 2022-02-15 RX ORDER — OMEPRAZOLE 20 MG/1
20 CAPSULE, DELAYED RELEASE ORAL DAILY
Qty: 90 CAPSULE | Refills: 3 | Status: SHIPPED | OUTPATIENT
Start: 2022-02-15

## 2022-02-15 NOTE — PROGRESS NOTES
ROOM # 2  Identified pt with two pt identifiers(name and ). Reviewed record in preparation for visit and have obtained necessary documentation. Chief Complaint   Patient presents with    Medication Refill      Asim Billingsley. preferred language for health care discussion is english/other. Is the patient using any DME equipment during OV? NO    Asim Stinsonxon. is due for:  Health Maintenance Due   Topic    Pneumococcal 0-64 years (1 of 2 - PPSV23)    DTaP/Tdap/Td series (1 - Tdap)    Shingrix Vaccine Age 50> (1 of 2)    COVID-19 Vaccine (3 - Booster for [employer] series)     Health Maintenance reviewed and discussed per provider  Please order/place referral if appropriate. 1. For patients aged 39-70: Has the patient had a colonoscopy? Yes - no Care Gap present   If the patient is female:    2. For patients aged 41-77: Has the patient had a mammogram within the past 2 years? No    3. For patients aged 21-65: Has the patient had a pap smear? No  Advance Directive:  1. Do you have an advance directive in place? Patient Reply: NO    2. If not, would you like material regarding how to put one in place? NO    Coordination of Care:  1. Have you been to the ER, urgent care clinic since your last visit? Hospitalized since your last visit? NO    2. Have you seen or consulted any other health care providers outside of the [address] [place] [address] since your last visit? Include any pap smears or colon screening. NO    Patient is accompanied by self I have received verbal consent from Asim Billingsley. to discuss any/all medical information while they are present in the room.     Learning Assessment:  Learning Assessment 2019   PRIMARY LEARNER Patient Patient   HIGHEST LEVEL OF EDUCATION - PRIMARY LEARNER  GRADUATED HIGH SCHOOL OR GED -   PRIMARY LANGUAGE ENGLISH ENGLISH   LEARNER PREFERENCE PRIMARY READING DEMONSTRATION   ANSWERED BY patient pt   RELATIONSHIP SELF SELF     Depression Screening:  3 most recent [facility] Screens 9/23/2021 9/23/2021 9/8/2021 3/24/2021 3/17/2021 1/3/2020 7/12/2019   Little interest or pleasure in doing things Not at all Not at all Not at all Not at all Not at all Not at all Not at all   Feeling down, depressed, irritable, or hopeless Not at all Not at all Not at all Not at all Not at all Not at all Not at all   Total Score PHQ 2 0 0 0 0 0 0 0     Fall Risk  Fall Risk Assessment, last 12 mths 3/17/2021   Able to walk? Yes   Fall in past 12 months? 0     Recent Travel Screening and Travel History documentation     Travel Screening     No screening recorded since 02/14/22 0000     Travel History   Travel since 01/15/22    No documented travel since 01/15/22         Pt given number to f/u with [facility] for appt.

## 2022-02-15 NOTE — PATIENT INSTRUCTIONS
[facility] at ph: [phone] can set up for colonoscopy as well as elevated liver enzymes     Dermatology for itching

## 2022-02-15 NOTE — PROGRESS NOTES
HISTORY OF PRESENT ILLNESS  Jordin Cantor is a 59 y.o. male. HLD and HTN management. HPI  1) HTN - controlled- amlodipine 10 mg and HCTZ 25 mg - ran out this am     BP Readings from Last 3 Encounters:   02/15/22 106/79   09/23/21 106/68   09/08/21 106/70     2) HLD - taking Lipitor 20 mg daily. ran out this am   Lab Results   Component Value Date/Time    Cholesterol, total 227 (H) 03/17/2021 10:39 AM    HDL Cholesterol 92 (H) 03/17/2021 10:39 AM    LDL, calculated 121.6 (H) 03/17/2021 10:39 AM    VLDL, calculated 13.4 03/17/2021 10:39 AM    Triglyceride 67 03/17/2021 10:39 AM    CHOL/HDL Ratio 2.5 03/17/2021 10:39 AM        3) Urticaria - drinks a fifth of liquor a week - itching worse with heat. Tried gabapentin and Claritin. 4) Prediabetes - Last A1C 6.3%. Lab Results   Component Value Date/Time    Hemoglobin A1c 6.3 (H) 03/17/2021 10:39 AM    Hemoglobin A1c 5.8 (H) 01/03/2020 03:32 PM    Hemoglobin A1c 6.1 (H) 07/12/2019 02:55 PM     5) Elevated LFT's - Admits EtOH consumption is higher than it should be. Admits to a fifth Liquor week. Has not followed with GI referral from 09/08/2021 - tremors noted today        6) GERD - prilosec 20 mg daily       7) Vit D deficiency - 15.6  Lab Results   Component Value Date/Time    Vitamin D 25-Hydroxy 15.6 (L) 03/17/2021 10:39 AM         8) ED - controlled with Viagra as needed        9) chronic back pain -taking  Flexeril as needed and diclofenac daily - request refills today     /79 (BP 1 Location: Left upper arm, BP Patient Position: Sitting)   Pulse 80   Temp 98.1 °F (36.7 °C) (Temporal)   Resp 18   Ht 6' 4\" (1.93 m)   Wt 222 lb 6.4 oz (100.9 kg)   SpO2 97%   BMI 27.07 kg/m²   Current Outpatient Medications   Medication Sig Dispense Refill    amLODIPine (NORVASC) 10 mg tablet Take 1 Tablet by mouth daily. 90 Tablet 1    atorvastatin (LIPITOR) 20 mg tablet Take 1 Tablet by mouth daily.  90 Tablet 1    diclofenac EC (VOLTAREN) 75 mg EC tablet 1 every day 90 Tablet 4    cyclobenzaprine (FLEXERIL) 10 mg tablet take 1 tablet by mouth every 8 hours if needed. Caution: Drowsiness. No driving or working with use. 180 Tablet 2    hydroCHLOROthiazide (HYDRODIURIL) 25 mg tablet Take 1 Tablet by mouth daily. 90 Tablet 3    omeprazole (PRILOSEC) 20 mg capsule Take 1 Capsule by mouth daily. 90 Capsule 3    sildenafil citrate (Viagra) 100 mg tablet Take 1 Tablet by mouth as needed for Erectile Dysfunction. 9 Tablet 5    Allergy Relief, cetirizine, 10 mg tablet take 1 tablet by mouth daily 30 Tablet 0    ergocalciferol (Vitamin D2) 1,250 mcg (50,000 unit) capsule Take 1 Capsule by mouth every seven (7) days. (Patient not taking: Reported on 2/15/2022) 12 Capsule 2       Review of Systems   Constitutional: Negative for chills, fever and malaise/fatigue. Eyes: Negative for blurred vision and double vision. Respiratory: Negative for cough, shortness of breath and wheezing. Cardiovascular: Negative for chest pain, palpitations and leg swelling. Gastrointestinal: Positive for heartburn. Negative for abdominal pain, blood in stool, constipation, diarrhea, melena, nausea and vomiting. Musculoskeletal: Positive for back pain and joint pain. Neurological: Negative for dizziness and headaches. Physical Exam  Vitals and nursing note reviewed. Constitutional:       General: He is not in acute distress. Appearance: Normal appearance. He is not ill-appearing. HENT:      Head: Normocephalic. Right Ear: External ear normal.      Left Ear: External ear normal.      Mouth/Throat:      Comments: MASK  Eyes:      General: No scleral icterus. Extraocular Movements: Extraocular movements intact. Conjunctiva/sclera: Conjunctivae normal.      Pupils: Pupils are equal, round, and reactive to light. Cardiovascular:      Rate and Rhythm: Normal rate and regular rhythm. Pulses: Normal pulses. Heart sounds: Normal heart sounds. Pulmonary:      Effort: Pulmonary effort is normal. No respiratory distress. Breath sounds: Normal breath sounds. No wheezing. Abdominal:      General: Abdomen is flat. Bowel sounds are normal.      Tenderness: There is no guarding or rebound. Musculoskeletal:         General: Normal range of motion. Cervical back: Normal range of motion. Right lower leg: No edema. Left lower leg: No edema. Lymphadenopathy:      Cervical: No cervical adenopathy. Skin:     General: Skin is warm and dry. Findings: No lesion or rash. Neurological:      General: No focal deficit present. Mental Status: He is alert and oriented to person, place, and time. Mental status is at baseline. Motor: Tremor present. Psychiatric:         Mood and Affect: Mood normal.         Behavior: Behavior normal.         Thought Content: Thought content normal.         Judgment: Judgment normal.       ASSESSMENT and PLAN  Diagnoses and all orders for this visit:    1. Essential hypertension  -     amLODIPine (NORVASC) 10 mg tablet; Take 1 Tablet by mouth daily. -     hydroCHLOROthiazide (HYDRODIURIL) 25 mg tablet; Take 1 Tablet by mouth daily.  -     CBC WITH AUTOMATED DIFF; Future  -     METABOLIC PANEL, COMPREHENSIVE; Future  -     URINALYSIS W/ RFLX MICROSCOPIC; Future    2. Hyperlipidemia, unspecified hyperlipidemia type  -     atorvastatin (LIPITOR) 20 mg tablet; Take 1 Tablet by mouth daily.  -     LIPID PANEL; Future    3. Chronic bilateral low back pain without sciatica  -     diclofenac EC (VOLTAREN) 75 mg EC tablet; 1 every day  -     cyclobenzaprine (FLEXERIL) 10 mg tablet; take 1 tablet by mouth every 8 hours if needed. Caution: Drowsiness. No driving or working with use. 4. Gastroesophageal reflux disease  -     omeprazole (PRILOSEC) 20 mg capsule; Take 1 Capsule by mouth daily.     5. Erectile dysfunction, unspecified erectile dysfunction type  -     sildenafil citrate (Viagra) 100 mg tablet; Take 1 Tablet by mouth as needed for Erectile Dysfunction.  -     TSH AND FREE T4; Future    6. Vitamin D deficiency  -     VITAMIN D, 25 HYDROXY; Future    7. Prediabetes  -     HEMOGLOBIN A1C WITH EAG; Future    8. Encounter for immunization  -     PNEUMOCOCCAL POLYSACCHARIDE VACCINE, 23-VALENT, ADULT OR IMMUNOSUPPRESSED PT DOSE,  -     TETANUS, DIPHTHERIA TOXOIDS AND ACELLULAR PERTUSSIS VACCINE (TDAP), IN INDIVIDS. >=7, IM    HTN - controlled continue amlodipine and HCTZ  HLD - continue Lipitor update labs  Chronic pain pain - continue flexeril as needed and diclofenac daily   GERD - continue prilosec   ED - continue viagra as needed  Vitamin D - update labs   Prediabetes - update labs   urticaria - continue Claritin     Follow-up and Dispositions    · Return in about 4 months (around 6/15/2022) for HTN, CHOL.

## 2022-02-15 NOTE — PROGRESS NOTES
Dasha Jamison. is a 59 y.o. male who presents for routine immunizations. He denies any symptoms , reactions or allergies that would exclude them from being immunized today. Risks and adverse reactions were discussed and the VIS was given to them. All questions were addressed. He was observed for 15 min post injection. There were no reactions observed.     Kenji Wright

## 2022-02-16 NOTE — PROGRESS NOTES
Results reviewed. Please call patient with results. A1C still indicates prediabetes  Vitamin D is in a good range   Is he having urinary symptoms? ? If so I need him to return to lab for a urine culture  Liver enzymes are worse - it is imperative he follows the GI referral placed for evaluation - Recommend he quit drinking   Recommend he take his lipitor nightly

## 2022-02-23 DIAGNOSIS — R82.90 ABNORMAL URINALYSIS: Primary | ICD-10-CM

## 2022-02-24 ENCOUNTER — HOSPITAL ENCOUNTER (OUTPATIENT)
Dept: LAB | Age: 62
Discharge: HOME | End: 2022-02-24
Payer: COMMERCIAL

## 2022-02-24 DIAGNOSIS — R82.90 ABNORMAL URINALYSIS: ICD-10-CM

## 2022-02-24 PROCEDURE — 87086 URINE CULTURE/COLONY COUNT: CPT

## 2022-02-26 LAB
BACTERIA SPEC CULT: NORMAL
SERVICE CMNT-IMP: NORMAL

## 2022-02-28 NOTE — PROGRESS NOTES
Results reviewed. Please call patient with results.     Culture was negative
Spoke with patient and 2 patient identifiers was confirmed. Patient was given results below and verbalized understanding . Patient has no questions/concerns  at this time.
Clear bilaterally, pupils equal, round and reactive to light.

## 2022-04-12 ENCOUNTER — OFFICE VISIT (OUTPATIENT)
Dept: INTERNAL MEDICINE CLINIC | Age: 62
End: 2022-04-12
Payer: COMMERCIAL

## 2022-04-12 VITALS
DIASTOLIC BLOOD PRESSURE: 79 MMHG | SYSTOLIC BLOOD PRESSURE: 125 MMHG | HEIGHT: 76 IN | BODY MASS INDEX: 28.01 KG/M2 | HEART RATE: 78 BPM | RESPIRATION RATE: 18 BRPM | OXYGEN SATURATION: 98 % | WEIGHT: 230 LBS | TEMPERATURE: 97 F

## 2022-04-12 DIAGNOSIS — R74.01 TRANSAMINITIS: Primary | ICD-10-CM

## 2022-04-12 DIAGNOSIS — F10.19 DISORDER DUE TO ALCOHOL ABUSE (HCC): ICD-10-CM

## 2022-04-12 DIAGNOSIS — I10 ESSENTIAL HYPERTENSION: ICD-10-CM

## 2022-04-12 DIAGNOSIS — G57.93 NEUROPATHY OF BOTH FEET: ICD-10-CM

## 2022-04-12 DIAGNOSIS — Z01.00 ENCOUNTER FOR EYE EXAM: ICD-10-CM

## 2022-04-12 PROCEDURE — 99214 OFFICE O/P EST MOD 30 MIN: CPT | Performed by: INTERNAL MEDICINE

## 2022-04-12 RX ORDER — HYDROCHLOROTHIAZIDE 25 MG/1
25 TABLET ORAL DAILY
Qty: 90 TABLET | Refills: 3 | Status: SHIPPED | OUTPATIENT
Start: 2022-04-12 | End: 2022-07-21 | Stop reason: SDUPTHER

## 2022-04-12 NOTE — PROGRESS NOTES
Carma Bence. is a 59 y.o. male (: 1960) presenting to address:    Chief Complaint   Patient presents with    Tingling     patient c/o tingling in feet and arms x 3 weeks       Vitals:    22 1520   BP: 125/79   Pulse: 78   Resp: 18   Temp: 97 °F (36.1 °C)   TempSrc: Temporal   SpO2: 98%   Weight: 230 lb (104.3 kg)   Height: 6' 4\" (1.93 m)   PainSc:   0 - No pain       Hearing/Vision:   No exam data present    Learning Assessment:     Learning Assessment 2019   PRIMARY LEARNER Patient   HIGHEST LEVEL OF EDUCATION - PRIMARY LEARNER  GRADUATED HIGH SCHOOL OR GED   PRIMARY LANGUAGE ENGLISH   LEARNER PREFERENCE PRIMARY READING   ANSWERED BY patient   RELATIONSHIP SELF     Depression Screening:     3 most recent PHQ Screens 2022   Little interest or pleasure in doing things Not at all   Feeling down, depressed, irritable, or hopeless Not at all   Total Score PHQ 2 0     Fall Risk Assessment:     Fall Risk Assessment, last 12 mths 3/17/2021   Able to walk? Yes   Fall in past 12 months? 0     Abuse Screening:   No flowsheet data found. Coordination of Care Questionaire:     Advanced Directive:   1. Do you have an Advanced Directive? NO    2. Would you like information on Advanced Directives? NO    1. \"Have you been to the ER, urgent care clinic since your last visit? Hospitalized since your last visit? \" No    2. \"Have you seen or consulted any other health care providers outside of the [address] [place] [address] since your last visit? \" No     3. For patients aged 39-70: Has the patient had a colonoscopy? No     If the patient is female:    4. For patients aged 41-77: Has the patient had a mammogram within the past 2 years? No    5. For patients aged 21-65: Has the patient had a pap smear?  No

## 2022-04-12 NOTE — Clinical Note
NOTIFICATION RETURN TO WORK / SCHOOL    4/12/2022 5:07 PM    Mr. [address]      To Whom It May Concern: Keshia Lopez. is currently under the care of Ha Weber. He will return to work/school on: ***    If there are questions or concerns please have the patient contact our office.         Sincerely,      Rell Zuniga MD

## 2022-04-12 NOTE — LETTER
NOTIFICATION RETURN TO WORK / SCHOOL    4/12/2022 3:56 PM    Mr. [address]      To Whom It May Concern: Urvashi Aurora. is currently under the care of Ha Weber. He will return to work/school on: 04/13/2022    If there are questions or concerns please have the patient contact our office.         Sincerely,      Esperanza Negron MD

## 2022-04-12 NOTE — PROGRESS NOTES
Progress Note    Patient: Abhilash Allen Sex: male                  YOB: 1960      Age:  59 y.o.                    HPI:     Abhilash Allen is a 59 y.o. male who has been seen for evaluation of  numbness and tingling both lower legs. He is also followed for hypertension . Linda Melissa He has tingling in his  great toes. He gets tingling in both legs as well . Linda Rued He drinks a fifth on the weekends and  0ne or two beers on     Past Medical History:   Diagnosis Date    Arthritis     Arthritis     Hypertension        Past Surgical History:   Procedure Laterality Date    HX OTHER SURGICAL      I&D hemorrhoids x 2    IR CHOLECYSTOSTOMY PERCUTANEOUS         Family History   Problem Relation Age of Onset    Cancer Mother         colon ca    Alzheimer's Disease Father     Cancer Father        Social History     Socioeconomic History    Marital status: SINGLE   Tobacco Use    Smoking status: Current Some Day Smoker     Packs/day: 5.00     Types: Cigarettes, Cigars     Last attempt to quit: 3/1/2020     Years since quittin.1    Smokeless tobacco: Never Used    Tobacco comment: LESS THAN 5 A DAY   Substance and Sexual Activity    Alcohol use: Yes     Alcohol/week: 3.3 standard drinks     Types: 4 Standard drinks or equivalent per week    Drug use: No    Sexual activity: Not Currently         Current Outpatient Medications:     amLODIPine (NORVASC) 10 mg tablet, Take 1 Tablet by mouth daily. , Disp: 90 Tablet, Rfl: 1    atorvastatin (LIPITOR) 20 mg tablet, Take 1 Tablet by mouth daily. , Disp: 90 Tablet, Rfl: 1    diclofenac EC (VOLTAREN) 75 mg EC tablet, 1 every day, Disp: 90 Tablet, Rfl: 4    cyclobenzaprine (FLEXERIL) 10 mg tablet, take 1 tablet by mouth every 8 hours if needed. Caution: Drowsiness. No driving or working with use., Disp: 180 Tablet, Rfl: 2    hydroCHLOROthiazide (HYDRODIURIL) 25 mg tablet, Take 1 Tablet by mouth daily. , Disp: 90 Tablet, Rfl: 3    omeprazole (PRILOSEC) 20 mg capsule, Take 1 Capsule by mouth daily. , Disp: 90 Capsule, Rfl: 3    sildenafil citrate (Viagra) 100 mg tablet, Take 1 Tablet by mouth as needed for Erectile Dysfunction. , Disp: 9 Tablet, Rfl: 5    Allergy Relief, cetirizine, 10 mg tablet, take 1 tablet by mouth daily, Disp: 30 Tablet, Rfl: 0    ergocalciferol (Vitamin D2) 1,250 mcg (50,000 unit) capsule, Take 1 Capsule by mouth every seven (7) days. (Patient not taking: Reported on 4/12/2022), Disp: 12 Capsule, Rfl: 2     No Known Allergies    Review of Systems   Constitutional: Negative for chills and fever. Respiratory: Negative for cough and shortness of breath. Cardiovascular: Negative for chest pain. Gastrointestinal: Negative. Genitourinary: Negative. Neurological: Positive for loss of consciousness. Negative for dizziness. Physical Exam:      Visit Vitals  /79 (BP 1 Location: Right upper arm, BP Patient Position: Sitting, BP Cuff Size: Large adult)   Pulse 78   Temp 97 °F (36.1 °C) (Temporal)   Resp 18   Ht 6' 4\" (1.93 m)   Wt 230 lb (104.3 kg)   SpO2 98%   BMI 28.00 kg/m²       Physical Exam  Constitutional:       Appearance: Normal appearance. HENT:      Head: Normocephalic and atraumatic. Cardiovascular:      Rate and Rhythm: Normal rate and regular rhythm. Pulmonary:      Effort: Pulmonary effort is normal. No respiratory distress. Breath sounds: Normal breath sounds. No stridor. No wheezing, rhonchi or rales. Skin:     General: Skin is warm and dry. Neurological:      Mental Status: He is alert. Labs Reviewed:  hgba1c was 6.1     Assessment/Plan       ICD-10-CM ICD-9-CM    1. Transaminitis  R74.01 790.4    2. Essential hypertension  I10 401.9    3. Neuropathy of both feet  G57.93 356.9    4. Encounter for eye exam  Z01.00 V72.0    5. [facility] due to [facility] abuse ([facility]  F10.19 [id number]    advised liver changes and neuropathy are probably related to his ethanol usage .  Advised should may serious effort to reduce same           Itz Chu MD

## 2022-07-21 ENCOUNTER — OFFICE VISIT (OUTPATIENT)
Dept: INTERNAL MEDICINE CLINIC | Age: 62
End: 2022-07-21
Payer: COMMERCIAL

## 2022-07-21 VITALS
OXYGEN SATURATION: 96 % | SYSTOLIC BLOOD PRESSURE: 103 MMHG | HEART RATE: 80 BPM | DIASTOLIC BLOOD PRESSURE: 67 MMHG | WEIGHT: 222 LBS | TEMPERATURE: 97.4 F | BODY MASS INDEX: 27.03 KG/M2 | HEIGHT: 76 IN

## 2022-07-21 DIAGNOSIS — E78.5 HYPERLIPIDEMIA, UNSPECIFIED HYPERLIPIDEMIA TYPE: ICD-10-CM

## 2022-07-21 DIAGNOSIS — I10 ESSENTIAL HYPERTENSION: Primary | ICD-10-CM

## 2022-07-21 DIAGNOSIS — R73.03 PREDIABETES: ICD-10-CM

## 2022-07-21 PROCEDURE — 99214 OFFICE O/P EST MOD 30 MIN: CPT | Performed by: NURSE PRACTITIONER

## 2022-07-21 RX ORDER — AMLODIPINE BESYLATE 10 MG/1
10 TABLET ORAL DAILY
Qty: 90 TABLET | Refills: 1 | Status: SHIPPED | OUTPATIENT
Start: 2022-07-21

## 2022-07-21 RX ORDER — HYDROCHLOROTHIAZIDE 25 MG/1
25 TABLET ORAL DAILY
Qty: 90 TABLET | Refills: 3 | Status: SHIPPED | OUTPATIENT
Start: 2022-07-21

## 2022-07-21 RX ORDER — ATORVASTATIN CALCIUM 20 MG/1
20 TABLET, FILM COATED ORAL DAILY
Qty: 90 TABLET | Refills: 1 | Status: SHIPPED | OUTPATIENT
Start: 2022-07-21

## 2022-07-21 NOTE — PROGRESS NOTES
HISTORY OF PRESENT ILLNESS  Pradip Silverman is a 59 y.o. male. HLD and HTN management. HPI  1) HTN - controlled- amlodipine 10 mg and HCTZ 25 mg   BP Readings from Last 3 Encounters:   07/21/22 103/67   04/12/22 125/79   02/15/22 106/79      2) HLD - taking Lipitor 20 mg daily  Lab Results   Component Value Date/Time    Cholesterol, total 201 (H) 02/15/2022 10:00 AM    HDL Cholesterol 88 (H) 02/15/2022 10:00 AM    LDL, calculated 95.8 02/15/2022 10:00 AM    VLDL, calculated 17.2 02/15/2022 10:00 AM    Triglyceride 86 02/15/2022 10:00 AM    CHOL/HDL Ratio 2.3 02/15/2022 10:00 AM      Lab Results   Component Value Date/Time    Sodium 134 (L) 02/15/2022 10:00 AM    Potassium 3.7 02/15/2022 10:00 AM    Chloride 97 (L) 02/15/2022 10:00 AM    CO2 28 02/15/2022 10:00 AM    Anion gap 9 02/15/2022 10:00 AM    Glucose 92 02/15/2022 10:00 AM    BUN 17 02/15/2022 10:00 AM    Creatinine 1.07 02/15/2022 10:00 AM    BUN/Creatinine ratio 16 02/15/2022 10:00 AM    GFR est AA >60 02/15/2022 10:00 AM    GFR est non-AA >60 02/15/2022 10:00 AM    Calcium 9.7 02/15/2022 10:00 AM    Bilirubin, total 1.0 02/15/2022 10:00 AM    Alk.  phosphatase 82 02/15/2022 10:00 AM    Protein, total 9.3 (H) 02/15/2022 10:00 AM    Albumin 4.2 02/15/2022 10:00 AM    Globulin 5.1 (H) 02/15/2022 10:00 AM    A-G Ratio 0.8 02/15/2022 10:00 AM    ALT (SGPT) 90 (H) 02/15/2022 10:00 AM    AST (SGOT) 78 (H) 02/15/2022 10:00 AM     3) numbness and tingling in legs and feet -   Lab Results   Component Value Date/Time    Hemoglobin A1c 6.1 (H) 02/15/2022 10:00 AM    Hemoglobin A1c 6.3 (H) 03/17/2021 10:39 AM    Hemoglobin A1c 5.8 (H) 01/03/2020 03:32 PM          /67 (BP 1 Location: Left upper arm, BP Patient Position: Sitting)   Pulse 80   Temp 97.4 °F (36.3 °C) (Temporal)   Ht 6' 4\" (1.93 m)   Wt 222 lb (100.7 kg)   SpO2 96%   BMI 27.02 kg/m²   Current Outpatient Medications   Medication Sig Dispense Refill    hydroCHLOROthiazide (HYDRODIURIL) 25 mg tablet Take 1 Tablet by mouth in the morning. 90 Tablet 3    amLODIPine (NORVASC) 10 mg tablet Take 1 Tablet by mouth in the morning. 90 Tablet 1    atorvastatin (LIPITOR) 20 mg tablet Take 1 Tablet by mouth in the morning. 90 Tablet 1    diclofenac EC (VOLTAREN) 75 mg EC tablet 1 every day 90 Tablet 4    cyclobenzaprine (FLEXERIL) 10 mg tablet take 1 tablet by mouth every 8 hours if needed. Caution: Drowsiness. No driving or working with use. 180 Tablet 2    omeprazole (PRILOSEC) 20 mg capsule Take 1 Capsule by mouth daily. 90 Capsule 3    sildenafil citrate (Viagra) 100 mg tablet Take 1 Tablet by mouth as needed for Erectile Dysfunction. 9 Tablet 5    Allergy Relief, cetirizine, 10 mg tablet take 1 tablet by mouth daily 30 Tablet 0    ergocalciferol (Vitamin D2) 1,250 mcg (50,000 unit) capsule Take 1 Capsule by mouth every seven (7) days. (Patient not taking: No sig reported) 12 Capsule 2       Review of Systems   Constitutional:  Negative for chills, fever and malaise/fatigue. Eyes:  Negative for blurred vision and double vision. Respiratory:  Negative for cough, shortness of breath and wheezing. Cardiovascular:  Negative for chest pain and palpitations. Genitourinary:  Positive for frequency. Neurological:  Negative for dizziness and headaches. Endo/Heme/Allergies:  Positive for polydipsia. Physical Exam  Vitals and nursing note reviewed. Constitutional:       General: He is not in acute distress. Appearance: Normal appearance. He is not ill-appearing. HENT:      Head: Normocephalic. Right Ear: External ear normal.      Left Ear: External ear normal.      Mouth/Throat:      Comments: MASK  Eyes:      General: No scleral icterus. Conjunctiva/sclera: Conjunctivae normal.   Cardiovascular:      Rate and Rhythm: Normal rate and regular rhythm. Pulses: Normal pulses. Heart sounds: Normal heart sounds.    Pulmonary:      Effort: Pulmonary effort is normal. No respiratory distress. Breath sounds: Normal breath sounds. No wheezing. Musculoskeletal:         General: Normal range of motion. Cervical back: Normal range of motion. Right lower leg: No edema. Left lower leg: No edema. Lymphadenopathy:      Cervical: No cervical adenopathy. Skin:     General: Skin is warm and dry. Findings: No lesion or rash. Neurological:      General: No focal deficit present. Mental Status: He is alert and oriented to person, place, and time. Psychiatric:         Mood and Affect: Mood normal.         Behavior: Behavior normal.         Thought Content: Thought content normal.         Judgment: Judgment normal.       ASSESSMENT and PLAN  Diagnoses and all orders for this visit:    1. Essential hypertension  -     METABOLIC PANEL, COMPREHENSIVE; Future  -     hydroCHLOROthiazide (HYDRODIURIL) 25 mg tablet; Take 1 Tablet by mouth in the morning.  -     amLODIPine (NORVASC) 10 mg tablet; Take 1 Tablet by mouth in the morning. 2. Hyperlipidemia, unspecified hyperlipidemia type  -     METABOLIC PANEL, COMPREHENSIVE; Future  -     atorvastatin (LIPITOR) 20 mg tablet; Take 1 Tablet by mouth in the morning. 3. Prediabetes  -     HEMOGLOBIN A1C WITH EAG; Future  No orders of the defined types were placed in this encounter. Follow-up and Dispositions    Return in about 4 months (around 11/21/2022) for HTN, CHOL.        current treatment plan is effective, no change in therapy  Update A1C due to numbness and tingling

## 2022-07-21 NOTE — PROGRESS NOTES
1. \"Have you been to the ER, urgent care clinic since your last visit? Hospitalized since your last visit? \" No    2. \"Have you seen or consulted any other health care providers outside of the [address] [place] [address] since your last visit? \" No     3. For patients aged 39-70: Has the patient had a colonoscopy / FIT/ Cologuard? No      If the patient is female:    4. For patients aged 41-77: Has the patient had a mammogram within the past 2 years? NA - based on age or sex      11. For patients aged 21-65: Has the patient had a pap smear?  NA - based on age or sex

## 2022-07-27 ENCOUNTER — HOSPITAL ENCOUNTER (OUTPATIENT)
Dept: LAB | Age: 62
Discharge: HOME | End: 2022-07-27
Payer: COMMERCIAL

## 2022-07-27 DIAGNOSIS — I10 ESSENTIAL HYPERTENSION: ICD-10-CM

## 2022-07-27 DIAGNOSIS — E78.5 HYPERLIPIDEMIA, UNSPECIFIED HYPERLIPIDEMIA TYPE: ICD-10-CM

## 2022-07-27 DIAGNOSIS — R73.03 PREDIABETES: ICD-10-CM

## 2022-07-27 LAB
ALBUMIN SERPL-MCNC: 3.7 G/DL (ref 3.4–5)
ALBUMIN/GLOB SERPL: 0.9 {RATIO} (ref 0.8–1.7)
ALP SERPL-CCNC: 73 U/L (ref 45–117)
ALT SERPL-CCNC: 71 U/L (ref 16–61)
ANION GAP SERPL CALC-SCNC: 9 MMOL/L (ref 3–18)
AST SERPL-CCNC: 59 U/L (ref 10–38)
BILIRUB SERPL-MCNC: 0.5 MG/DL (ref 0.2–1)
BUN SERPL-MCNC: 44 MG/DL (ref 7–18)
BUN/CREAT SERPL: 22 (ref 12–20)
CALCIUM SERPL-MCNC: 8.9 MG/DL (ref 8.5–10.1)
CHLORIDE SERPL-SCNC: 105 MMOL/L (ref 100–111)
CO2 SERPL-SCNC: 23 MMOL/L (ref 21–32)
CREAT SERPL-MCNC: 2.01 MG/DL (ref 0.6–1.3)
EST. AVERAGE GLUCOSE BLD GHB EST-MCNC: 137 MG/DL
GLOBULIN SER CALC-MCNC: 4.1 G/DL (ref 2–4)
GLUCOSE SERPL-MCNC: 94 MG/DL (ref 74–99)
HBA1C MFR BLD: 6.4 % (ref 4.2–5.6)
POTASSIUM SERPL-SCNC: 4 MMOL/L (ref 3.5–5.5)
PROT SERPL-MCNC: 7.8 G/DL (ref 6.4–8.2)
SODIUM SERPL-SCNC: 137 MMOL/L (ref 136–145)

## 2022-07-27 PROCEDURE — 83036 HEMOGLOBIN GLYCOSYLATED A1C: CPT

## 2022-07-27 PROCEDURE — 36415 COLL VENOUS BLD VENIPUNCTURE: CPT

## 2022-07-27 PROCEDURE — 80053 COMPREHEN METABOLIC PANEL: CPT

## 2022-07-28 DIAGNOSIS — R79.89 ABNORMAL SERUM CREATININE LEVEL: Primary | ICD-10-CM

## 2022-07-28 NOTE — PROGRESS NOTES
Results reviewed. Please call patient with results.     A1C is prediabetes - 0.1 point away from DM - he will need to make diet changes cut out sugars, sweets, sodas, juices and processed carbs  His liver enzymes are still elevated   Kidney function is off - recommend he hydrate, hydrate  and return to clinic in one week for labs - please provide lab protocol - it is imperative he return for labs

## 2022-07-28 NOTE — PROGRESS NOTES
Confirmed results with pt, pt understands what needs to be done   Pt states that he will return for labs next week.

## 2022-10-09 NOTE — LETTER
4/12/2022 5:07 PM    Mr. Yarbroughsilvio Loida.  [address] [facility]              Sincerely,      Sen Mendoza MD Consult provided to DEONTE Lr.     Electronically signed by Wild Martin RN on 10/9/2022 at 11:17 AM

## 2023-01-05 ENCOUNTER — OFFICE VISIT (OUTPATIENT)
Dept: INTERNAL MEDICINE CLINIC | Age: 63
End: 2023-01-05
Payer: COMMERCIAL

## 2023-01-05 ENCOUNTER — VIRTUAL VISIT (OUTPATIENT)
Dept: INTERNAL MEDICINE CLINIC | Age: 63
End: 2023-01-05

## 2023-01-05 ENCOUNTER — HOSPITAL ENCOUNTER (OUTPATIENT)
Dept: LAB | Age: 63
Discharge: HOME | End: 2023-01-05
Payer: COMMERCIAL

## 2023-01-05 VITALS
SYSTOLIC BLOOD PRESSURE: 109 MMHG | RESPIRATION RATE: 18 BRPM | WEIGHT: 223 LBS | HEIGHT: 76 IN | OXYGEN SATURATION: 98 % | TEMPERATURE: 97 F | BODY MASS INDEX: 27.16 KG/M2 | DIASTOLIC BLOOD PRESSURE: 66 MMHG | HEART RATE: 82 BPM

## 2023-01-05 DIAGNOSIS — Z12.11 COLON CANCER SCREENING: ICD-10-CM

## 2023-01-05 DIAGNOSIS — R73.03 PREDIABETES: ICD-10-CM

## 2023-01-05 DIAGNOSIS — E78.5 HYPERLIPIDEMIA, UNSPECIFIED HYPERLIPIDEMIA TYPE: ICD-10-CM

## 2023-01-05 DIAGNOSIS — I10 ESSENTIAL HYPERTENSION: Primary | ICD-10-CM

## 2023-01-05 DIAGNOSIS — I10 ESSENTIAL HYPERTENSION: ICD-10-CM

## 2023-01-05 DIAGNOSIS — Z23 ENCOUNTER FOR IMMUNIZATION: ICD-10-CM

## 2023-01-05 LAB
ALBUMIN SERPL-MCNC: 4 G/DL (ref 3.4–5)
ALBUMIN/GLOB SERPL: 0.9 (ref 0.8–1.7)
ALP SERPL-CCNC: 75 U/L (ref 45–117)
ALT SERPL-CCNC: 51 U/L (ref 16–61)
ANION GAP SERPL CALC-SCNC: 8 MMOL/L (ref 3–18)
AST SERPL-CCNC: 57 U/L (ref 10–38)
BILIRUB SERPL-MCNC: 0.9 MG/DL (ref 0.2–1)
BUN SERPL-MCNC: 35 MG/DL (ref 7–18)
BUN/CREAT SERPL: 30 (ref 12–20)
CALCIUM SERPL-MCNC: 9.1 MG/DL (ref 8.5–10.1)
CHLORIDE SERPL-SCNC: 103 MMOL/L (ref 100–111)
CO2 SERPL-SCNC: 25 MMOL/L (ref 21–32)
CREAT SERPL-MCNC: 1.15 MG/DL (ref 0.6–1.3)
EST. AVERAGE GLUCOSE BLD GHB EST-MCNC: 126 MG/DL
GLOBULIN SER CALC-MCNC: 4.4 G/DL (ref 2–4)
GLUCOSE SERPL-MCNC: 99 MG/DL (ref 74–99)
HBA1C MFR BLD: 6 % (ref 4.2–5.6)
POTASSIUM SERPL-SCNC: 4.1 MMOL/L (ref 3.5–5.5)
PROT SERPL-MCNC: 8.4 G/DL (ref 6.4–8.2)
SODIUM SERPL-SCNC: 136 MMOL/L (ref 136–145)

## 2023-01-05 PROCEDURE — 99214 OFFICE O/P EST MOD 30 MIN: CPT | Performed by: NURSE PRACTITIONER

## 2023-01-05 PROCEDURE — 80053 COMPREHEN METABOLIC PANEL: CPT

## 2023-01-05 PROCEDURE — 83036 HEMOGLOBIN GLYCOSYLATED A1C: CPT

## 2023-01-05 PROCEDURE — 3078F DIAST BP <80 MM HG: CPT | Performed by: NURSE PRACTITIONER

## 2023-01-05 PROCEDURE — 90686 IIV4 VACC NO PRSV 0.5 ML IM: CPT | Performed by: NURSE PRACTITIONER

## 2023-01-05 PROCEDURE — 3074F SYST BP LT 130 MM HG: CPT | Performed by: NURSE PRACTITIONER

## 2023-01-05 PROCEDURE — 36415 COLL VENOUS BLD VENIPUNCTURE: CPT

## 2023-01-05 NOTE — PROGRESS NOTES
ROOM # 1  Identified pt with two pt identifiers(name and ). Reviewed record in preparation for visit and have obtained necessary documentation. Chief Complaint   Patient presents with    Cholesterol Problem    Hypertension      Aleisha Marino. preferred language for health care discussion is english/other. Is the patient using any DME equipment during OV? NO    Aleisha Marino. is due for:  Health Maintenance Due   Topic    Shingles Vaccine (1 of 2)    COVID-19 Vaccine (3 - Booster for [employer] series)    Flu Vaccine (1)     Health Maintenance reviewed and discussed per provider  Please order/place referral if appropriate. 1. For patients aged 39-70: Has the patient had a colonoscopy? No   If the patient is female:    2. For patients aged 41-77: Has the patient had a mammogram within the past 2 years? NA - based on age    1. For patients aged 21-65: Has the patient had a pap smear? No  Advance Directive:  1. Do you have an advance directive in place? Patient Reply: NO    2. If not, would you like material regarding how to put one in place? NO    Coordination of Care:  1. Have you been to the ER, urgent care clinic since your last visit? Hospitalized since your last visit? NO    2. Have you seen or consulted any other health care providers outside of the [address] [place] [address] since your last visit? Include any pap smears or colon screening. NO    Patient is accompanied by self   I have received verbal consent from Aleisha Marino. to discuss any/all medical information while they are present in the room.     Learning Assessment:  Learning Assessment 2019   PRIMARY LEARNER Patient Patient   HIGHEST LEVEL OF EDUCATION - PRIMARY LEARNER  GRADUATED HIGH SCHOOL OR GED -   PRIMARY LANGUAGE ENGLISH ENGLISH   LEARNER PREFERENCE PRIMARY READING DEMONSTRATION   ANSWERED BY patient pt   RELATIONSHIP SELF SELF     Depression Screening:  3 most recent [facility] Screens 2023 9/23/2021 9/8/2021 3/24/2021   Little interest or pleasure in doing things Not at all Not at all Not at all Not at all Not at all Not at all Not at all   Feeling down, depressed, irritable, or hopeless Not at all Not at all Not at all Not at all Not at all Not at all Not at all   Total Score PHQ 2 0 0 0 0 0 0 0       Fall Risk  Fall Risk Assessment, last 12 mths 3/17/2021   Able to walk? Yes   Fall in past 12 months?  0     Recent Travel Screening and Travel History documentation     Travel Screening     No screening recorded since 01/04/23 0000       Travel History   Travel since 12/05/22    No documented travel since 12/05/22

## 2023-01-05 NOTE — PROGRESS NOTES
HISTORY OF PRESENT ILLNESS  Valencia Milton is a 57 y.o. male. HLD and HTN management. Cholesterol Problem  Pertinent negatives include no chest pain and no shortness of breath. Hypertension   Pertinent negatives include no chest pain, no palpitations, no malaise/fatigue and no shortness of breath. 1) HTN - controlled- amlodipine 10 mg and HCTZ 25 mg   BP Readings from Last 3 Encounters:   01/05/23 109/66   07/21/22 103/67   04/12/22 125/79         2) HLD - taking Lipitor 20 mg daily  Lab Results   Component Value Date/Time    Cholesterol, total 201 (H) 02/15/2022 10:00 AM    HDL Cholesterol 88 (H) 02/15/2022 10:00 AM    LDL, calculated 95.8 02/15/2022 10:00 AM    VLDL, calculated 17.2 02/15/2022 10:00 AM    Triglyceride 86 02/15/2022 10:00 AM    CHOL/HDL Ratio 2.3 02/15/2022 10:00 AM      /66 (BP 1 Location: Right arm, BP Patient Position: Sitting)   Pulse 82   Temp 97 °F (36.1 °C) (Temporal)   Resp 18   Ht 6' 4\" (1.93 m)   Wt 223 lb (101.2 kg)   SpO2 98%   BMI 27.14 kg/m² '  Current Outpatient Medications   Medication Sig Dispense Refill    diclofenac EC (VOLTAREN) 75 mg EC tablet take 1 tablet by mouth three times a day if needed 90 Tablet 0    hydroCHLOROthiazide (HYDRODIURIL) 25 mg tablet Take 1 Tablet by mouth in the morning. 90 Tablet 3    amLODIPine (NORVASC) 10 mg tablet Take 1 Tablet by mouth in the morning. 90 Tablet 1    atorvastatin (LIPITOR) 20 mg tablet Take 1 Tablet by mouth in the morning. 90 Tablet 1    cyclobenzaprine (FLEXERIL) 10 mg tablet take 1 tablet by mouth every 8 hours if needed. Caution: Drowsiness. No driving or working with use. 180 Tablet 2    omeprazole (PRILOSEC) 20 mg capsule Take 1 Capsule by mouth daily. 90 Capsule 3    sildenafil citrate (Viagra) 100 mg tablet Take 1 Tablet by mouth as needed for Erectile Dysfunction.  9 Tablet 5    Allergy Relief, cetirizine, 10 mg tablet take 1 tablet by mouth daily 30 Tablet 0       Review of Systems   Constitutional: Negative for chills, fever and malaise/fatigue. Respiratory:  Negative for cough, shortness of breath and wheezing. Cardiovascular:  Negative for chest pain and palpitations. Neurological:  Positive for tingling. Physical Exam  Vitals and nursing note reviewed. Constitutional:       General: He is not in acute distress. Appearance: Normal appearance. He is not ill-appearing. HENT:      Head: Normocephalic. Right Ear: External ear normal.      Left Ear: External ear normal.      Mouth/Throat:      Comments: MASK  Eyes:      General: No scleral icterus. Conjunctiva/sclera: Conjunctivae normal.   Cardiovascular:      Rate and Rhythm: Normal rate and regular rhythm. Pulses: Normal pulses. Heart sounds: Normal heart sounds. Pulmonary:      Effort: Pulmonary effort is normal. No respiratory distress. Breath sounds: Normal breath sounds. No wheezing. Musculoskeletal:         General: Normal range of motion. Cervical back: Normal range of motion. Right lower leg: No edema. Left lower leg: No edema. Skin:     General: Skin is warm and dry. Findings: No lesion or rash. Neurological:      General: No focal deficit present. Mental Status: He is alert and oriented to person, place, and time. Psychiatric:         Mood and Affect: Mood normal.         Behavior: Behavior normal.         Thought Content: Thought content normal.         Judgment: Judgment normal.       ASSESSMENT and PLAN  Diagnoses and all orders for this visit:    1. Essential hypertension  -     METABOLIC PANEL, COMPREHENSIVE; Future    2. Hyperlipidemia, unspecified hyperlipidemia type    3. Prediabetes  -     HEMOGLOBIN A1C WITH EAG; Future    4.  Encounter for immunization  -     INFLUENZA, FLUARIX, FLULAVAL, FLUZONE (AGE 6 MO+), AFLURIA(AGE 3Y+) IM, PF, 0.5 ML  HTN - continue amlodipine and HCTZ at current dose   HLD - continue lipitor at current dose   Prediabetes - update A1C   Flu shot today   Follow-up and Dispositions    Return in about 4 months (around 5/5/2023) for HTN, CHOL. I will STOP taking the medications listed below when I get home from the hospital:  None

## 2023-01-08 NOTE — PROGRESS NOTES
Please contact patient regarding the followin) Elevated liver enzymes - increased from previous. Is he taking any new OTC supplements, drinking more EtOH, taking high doses of Tylenol? I would like to recheck these in 1 month. I am putting orders in for this. 2) LDL is a little better than last time but less so than I would expect. Is he taking his Lipitor every day? 3) Protein levels are a little elevated, but this was investigated in 2018 and was negative and appears stable. 4) Prediabetes is stable. Discharged

## 2023-02-27 RX ORDER — CYCLOBENZAPRINE HCL 10 MG
TABLET ORAL
Qty: 90 TABLET | Refills: 0 | Status: SHIPPED | OUTPATIENT
Start: 2023-02-27 | End: 2023-04-17

## 2023-06-02 ENCOUNTER — OFFICE VISIT (OUTPATIENT)
Facility: CLINIC | Age: 63
End: 2023-06-02
Payer: COMMERCIAL

## 2023-06-02 ENCOUNTER — HOSPITAL ENCOUNTER (OUTPATIENT)
Facility: HOSPITAL | Age: 63
Setting detail: SPECIMEN
End: 2023-06-02
Payer: COMMERCIAL

## 2023-06-02 VITALS
SYSTOLIC BLOOD PRESSURE: 111 MMHG | HEIGHT: 76 IN | OXYGEN SATURATION: 99 % | TEMPERATURE: 97.2 F | BODY MASS INDEX: 26.2 KG/M2 | WEIGHT: 215.2 LBS | RESPIRATION RATE: 17 BRPM | HEART RATE: 90 BPM | DIASTOLIC BLOOD PRESSURE: 72 MMHG

## 2023-06-02 DIAGNOSIS — M25.561 CHRONIC PAIN OF RIGHT KNEE: Primary | ICD-10-CM

## 2023-06-02 DIAGNOSIS — I10 ESSENTIAL HYPERTENSION: ICD-10-CM

## 2023-06-02 DIAGNOSIS — Z11.4 SCREENING FOR HIV (HUMAN IMMUNODEFICIENCY VIRUS): ICD-10-CM

## 2023-06-02 DIAGNOSIS — E78.5 HYPERLIPIDEMIA, UNSPECIFIED HYPERLIPIDEMIA TYPE: ICD-10-CM

## 2023-06-02 DIAGNOSIS — R10.13 DYSPEPSIA: ICD-10-CM

## 2023-06-02 DIAGNOSIS — G89.29 CHRONIC PAIN OF RIGHT KNEE: Primary | ICD-10-CM

## 2023-06-02 DIAGNOSIS — E55.9 VITAMIN D DEFICIENCY: ICD-10-CM

## 2023-06-02 DIAGNOSIS — Z23 ENCOUNTER FOR VACCINATION: ICD-10-CM

## 2023-06-02 DIAGNOSIS — E55.9 VITAMIN D DEFICIENCY: Primary | ICD-10-CM

## 2023-06-02 LAB
25(OH)D3 SERPL-MCNC: 16.6 NG/ML (ref 30–100)
ALBUMIN SERPL-MCNC: 4.2 G/DL (ref 3.4–5)
ALBUMIN/GLOB SERPL: 1 (ref 0.8–1.7)
ALP SERPL-CCNC: 74 U/L (ref 45–117)
ALT SERPL-CCNC: 88 U/L (ref 16–61)
ANION GAP SERPL CALC-SCNC: 7 MMOL/L (ref 3–18)
AST SERPL-CCNC: 79 U/L (ref 10–38)
BILIRUB SERPL-MCNC: 0.5 MG/DL (ref 0.2–1)
BUN SERPL-MCNC: 34 MG/DL (ref 7–18)
BUN/CREAT SERPL: 32 (ref 12–20)
CALCIUM SERPL-MCNC: 9.3 MG/DL (ref 8.5–10.1)
CHLORIDE SERPL-SCNC: 102 MMOL/L (ref 100–111)
CHOLEST SERPL-MCNC: 173 MG/DL
CO2 SERPL-SCNC: 27 MMOL/L (ref 21–32)
CREAT SERPL-MCNC: 1.05 MG/DL (ref 0.6–1.3)
GLOBULIN SER CALC-MCNC: 4.4 G/DL (ref 2–4)
GLUCOSE SERPL-MCNC: 103 MG/DL (ref 74–99)
HDLC SERPL-MCNC: 56 MG/DL (ref 40–60)
HDLC SERPL: 3.1 (ref 0–5)
LDLC SERPL CALC-MCNC: 101.4 MG/DL (ref 0–100)
LIPID PANEL: ABNORMAL
POTASSIUM SERPL-SCNC: 3.8 MMOL/L (ref 3.5–5.5)
PROT SERPL-MCNC: 8.6 G/DL (ref 6.4–8.2)
SODIUM SERPL-SCNC: 136 MMOL/L (ref 136–145)
TRIGL SERPL-MCNC: 78 MG/DL
VLDLC SERPL CALC-MCNC: 15.6 MG/DL

## 2023-06-02 PROCEDURE — 36415 COLL VENOUS BLD VENIPUNCTURE: CPT

## 2023-06-02 PROCEDURE — 82306 VITAMIN D 25 HYDROXY: CPT

## 2023-06-02 PROCEDURE — 80061 LIPID PANEL: CPT

## 2023-06-02 PROCEDURE — 99214 OFFICE O/P EST MOD 30 MIN: CPT | Performed by: INTERNAL MEDICINE

## 2023-06-02 PROCEDURE — 3074F SYST BP LT 130 MM HG: CPT | Performed by: INTERNAL MEDICINE

## 2023-06-02 PROCEDURE — 3078F DIAST BP <80 MM HG: CPT | Performed by: INTERNAL MEDICINE

## 2023-06-02 PROCEDURE — 90750 HZV VACC RECOMBINANT IM: CPT | Performed by: INTERNAL MEDICINE

## 2023-06-02 PROCEDURE — 90471 IMMUNIZATION ADMIN: CPT | Performed by: INTERNAL MEDICINE

## 2023-06-02 PROCEDURE — 80053 COMPREHEN METABOLIC PANEL: CPT

## 2023-06-02 RX ORDER — MELATONIN
1000 DAILY
Qty: 90 TABLET | Refills: 3 | Status: SHIPPED | OUTPATIENT
Start: 2023-09-09

## 2023-06-02 RX ORDER — ATORVASTATIN CALCIUM 20 MG/1
20 TABLET, FILM COATED ORAL EVERY MORNING
Qty: 90 TABLET | Refills: 1 | Status: SHIPPED | OUTPATIENT
Start: 2023-06-02

## 2023-06-02 RX ORDER — ERGOCALCIFEROL 1.25 MG/1
50000 CAPSULE ORAL WEEKLY
Qty: 14 CAPSULE | Refills: 0 | Status: SHIPPED | OUTPATIENT
Start: 2023-06-02 | End: 2023-09-02

## 2023-06-02 RX ORDER — AMLODIPINE BESYLATE 10 MG/1
10 TABLET ORAL EVERY MORNING
Qty: 90 TABLET | Refills: 1 | Status: SHIPPED | OUTPATIENT
Start: 2023-06-02

## 2023-06-02 RX ORDER — HYDROCHLOROTHIAZIDE 25 MG/1
25 TABLET ORAL DAILY
Qty: 90 TABLET | Refills: 1 | Status: SHIPPED | OUTPATIENT
Start: 2023-06-02

## 2023-06-02 SDOH — ECONOMIC STABILITY: INCOME INSECURITY: HOW HARD IS IT FOR YOU TO PAY FOR THE VERY BASICS LIKE FOOD, HOUSING, MEDICAL CARE, AND HEATING?: SOMEWHAT HARD

## 2023-06-02 SDOH — ECONOMIC STABILITY: FOOD INSECURITY: WITHIN THE PAST 12 MONTHS, THE FOOD YOU BOUGHT JUST DIDN'T LAST AND YOU DIDN'T HAVE MONEY TO GET MORE.: NEVER TRUE

## 2023-06-02 SDOH — ECONOMIC STABILITY: FOOD INSECURITY: WITHIN THE PAST 12 MONTHS, YOU WORRIED THAT YOUR FOOD WOULD RUN OUT BEFORE YOU GOT MONEY TO BUY MORE.: NEVER TRUE

## 2023-06-02 SDOH — ECONOMIC STABILITY: HOUSING INSECURITY
IN THE LAST 12 MONTHS, WAS THERE A TIME WHEN YOU DID NOT HAVE A STEADY PLACE TO SLEEP OR SLEPT IN A SHELTER (INCLUDING NOW)?: NO

## 2023-06-02 ASSESSMENT — ENCOUNTER SYMPTOMS
CONSTIPATION: 0
ABDOMINAL PAIN: 0
COUGH: 0
SHORTNESS OF BREATH: 0
DIARRHEA: 0

## 2023-06-02 NOTE — PATIENT INSTRUCTIONS
toll-free: [phone]  Website:  www. [employer]. [address]    What they offer: The [address] [place] [address] gives you and your family access to a free Prescription Drug Card program and savings of up to 75% at more than 50,000 national and [employer]. Phone toll-free: [phone]  Website: www.[employer]    additional resources? Call 211 or Find Help: https://www. [facility].org/

## 2023-06-02 NOTE — PROGRESS NOTES
Elida Brink. is a 57 y.o.  male presents today for office visit for   Chief Complaint   Patient presents with    Establish Care    Hypertension    Cholesterol Problem   . 1. \"Have you been to the ER, urgent care clinic since your last visit? Hospitalized since your last visit? \" No    2. \"Have you seen or consulted any other health care providers outside of the [address] [place] [address] since your last visit? \" No     3. For patients aged 39-70: Has the patient had a colonoscopy / FIT/ Cologuard? No     If the patient is female:    4. For patients aged 41-77: Has the patient had a mammogram within the past 2 years? N/A    5. For patients aged 21-65: Has the patient had a pap smear?  N/A

## 2023-06-05 ENCOUNTER — HOSPITAL ENCOUNTER (OUTPATIENT)
Facility: HOSPITAL | Age: 63
Setting detail: SPECIMEN
Discharge: HOME | End: 2023-06-08
Payer: COMMERCIAL

## 2023-06-05 DIAGNOSIS — R10.13 DYSPEPSIA: ICD-10-CM

## 2023-06-05 PROCEDURE — 87338 HPYLORI STOOL AG IA: CPT

## 2023-06-08 LAB
H PYLORI AG STL QL IA: NEGATIVE
SPECIMEN SOURCE: NORMAL

## 2023-07-25 ENCOUNTER — OFFICE VISIT (OUTPATIENT)
Age: 63
End: 2023-07-25
Payer: COMMERCIAL

## 2023-07-25 VITALS — BODY MASS INDEX: 26.19 KG/M2 | TEMPERATURE: 97.5 F | HEIGHT: 76 IN

## 2023-07-25 DIAGNOSIS — M16.11 UNILATERAL PRIMARY OSTEOARTHRITIS, RIGHT HIP: ICD-10-CM

## 2023-07-25 DIAGNOSIS — M25.561 RIGHT KNEE PAIN, UNSPECIFIED CHRONICITY: Primary | ICD-10-CM

## 2023-07-25 DIAGNOSIS — M79.651 RIGHT THIGH PAIN: ICD-10-CM

## 2023-07-25 DIAGNOSIS — M25.551 CHRONIC PAIN OF RIGHT HIP: ICD-10-CM

## 2023-07-25 DIAGNOSIS — G89.29 CHRONIC PAIN OF RIGHT HIP: ICD-10-CM

## 2023-07-25 DIAGNOSIS — M17.11 UNILATERAL PRIMARY OSTEOARTHRITIS, RIGHT KNEE: ICD-10-CM

## 2023-07-25 DIAGNOSIS — M23.91 LOCKING OF RIGHT KNEE: ICD-10-CM

## 2023-07-25 PROCEDURE — 72170 X-RAY EXAM OF PELVIS: CPT | Performed by: PHYSICIAN ASSISTANT

## 2023-07-25 PROCEDURE — 73564 X-RAY EXAM KNEE 4 OR MORE: CPT | Performed by: PHYSICIAN ASSISTANT

## 2023-07-25 PROCEDURE — 99204 OFFICE O/P NEW MOD 45 MIN: CPT | Performed by: PHYSICIAN ASSISTANT

## 2023-07-25 RX ORDER — MELOXICAM 15 MG/1
15 TABLET ORAL DAILY
Qty: 30 TABLET | Refills: 2 | Status: SHIPPED | OUTPATIENT
Start: 2023-07-25

## 2023-07-25 NOTE — PROGRESS NOTES
Alcohol/week: 3.3 standard drinks    Drug use: No    Sexual activity: Not on file   Other Topics Concern    Not on file   Social History Narrative    Not on file     Social Determinants of Health     Financial Resource Strain: Medium Risk    Difficulty of Paying Living Expenses: Somewhat hard   Food Insecurity: No Food Insecurity    Worried About Running Out of Food in the Last Year: Never true    Ran Out of Food in the Last Year: Never true   Transportation Needs: Unknown    Lack of Transportation (Medical): Not on file    Lack of Transportation (Non-Medical): No   Physical Activity: Not on file   Stress: Not on file   Social Connections: Not on file   Intimate Partner Violence: Not on file   Housing Stability: Unknown    Unable to Pay for Housing in the Last Year: Not on file    Number of Places Lived in the Last Year: Not on file    Unstable Housing in the Last Year: No       Past Surgical History:   Procedure Laterality Date    IR CHOLECYSTOSTOMY PERCUTANEOUS COMPLETE      OTHER SURGICAL HISTORY      I&D hemorrhoids x 2       Patient seen evaluated today for pain and advice regarding right lower extremity pain. He states has been going on for about 3 to 4 months without specific injury. He reports discomfort with ambulation as well as being seated. He does have some trouble with put on his shoes and socks. Reports mainly knee pain. He also has episodes where his knee wants to give way on him when he is ambulating. No falls to report. Patient denies recent fevers, chills, chest pain, SOB, or injuries. No recent systemic changes noted. A 12-point review of systems is performed today. Pertinent positives are noted. All other systems reviewed and otherwise are negative. Physical exam: General: Alert and oriented x3, nad.  well-developed, well nourished. normal affect, AF. NC/AT, EOMI, neck supple, trachea midline, no JVD present. Breathing is non-labored.   Examination of lower extremities reveals

## 2023-08-17 ENCOUNTER — HOSPITAL ENCOUNTER (OUTPATIENT)
Facility: HOSPITAL | Age: 63
Discharge: HOME | End: 2023-08-17
Payer: COMMERCIAL

## 2023-08-17 DIAGNOSIS — M16.11 UNILATERAL PRIMARY OSTEOARTHRITIS, RIGHT HIP: ICD-10-CM

## 2023-08-17 PROCEDURE — 6360000004 HC RX CONTRAST MEDICATION: Performed by: INTERNAL MEDICINE

## 2023-08-17 PROCEDURE — 20610 DRAIN/INJ JOINT/BURSA W/O US: CPT

## 2023-08-17 PROCEDURE — 6360000002 HC RX W HCPCS: Performed by: INTERNAL MEDICINE

## 2023-08-17 PROCEDURE — 2500000003 HC RX 250 WO HCPCS: Performed by: INTERNAL MEDICINE

## 2023-08-17 RX ORDER — TRIAMCINOLONE ACETONIDE 40 MG/ML
40 INJECTION, SUSPENSION INTRA-ARTICULAR; INTRAMUSCULAR ONCE
Status: COMPLETED | OUTPATIENT
Start: 2023-08-17 | End: 2023-08-17

## 2023-08-17 RX ORDER — LIDOCAINE HYDROCHLORIDE 10 MG/ML
10 INJECTION, SOLUTION EPIDURAL; INFILTRATION; INTRACAUDAL; PERINEURAL ONCE
Status: COMPLETED | OUTPATIENT
Start: 2023-08-17 | End: 2023-08-17

## 2023-08-17 RX ADMIN — IOPAMIDOL 10 ML: 408 INJECTION, SOLUTION INTRATHECAL at 10:54

## 2023-08-17 RX ADMIN — TRIAMCINOLONE ACETONIDE 40 MG: 40 INJECTION, SUSPENSION INTRA-ARTICULAR; INTRAMUSCULAR at 10:53

## 2023-08-17 RX ADMIN — LIDOCAINE HYDROCHLORIDE 10 ML: 10 INJECTION, SOLUTION EPIDURAL; INFILTRATION; INTRACAUDAL; PERINEURAL at 10:53

## 2023-09-13 ENCOUNTER — OFFICE VISIT (OUTPATIENT)
Facility: CLINIC | Age: 63
End: 2023-09-13
Payer: COMMERCIAL

## 2023-09-13 VITALS
BODY MASS INDEX: 26.06 KG/M2 | HEART RATE: 83 BPM | WEIGHT: 214 LBS | RESPIRATION RATE: 18 BRPM | DIASTOLIC BLOOD PRESSURE: 84 MMHG | SYSTOLIC BLOOD PRESSURE: 134 MMHG | OXYGEN SATURATION: 98 % | HEIGHT: 76 IN

## 2023-09-13 DIAGNOSIS — R74.8 ELEVATED LIVER ENZYMES: ICD-10-CM

## 2023-09-13 DIAGNOSIS — Z13.6 SCREENING FOR AAA (ABDOMINAL AORTIC ANEURYSM): ICD-10-CM

## 2023-09-13 DIAGNOSIS — M16.11 UNILATERAL PRIMARY OSTEOARTHRITIS, RIGHT HIP: ICD-10-CM

## 2023-09-13 DIAGNOSIS — E55.9 VITAMIN D DEFICIENCY: ICD-10-CM

## 2023-09-13 DIAGNOSIS — Z23 NEEDS FLU SHOT: Primary | ICD-10-CM

## 2023-09-13 DIAGNOSIS — M17.11 UNILATERAL PRIMARY OSTEOARTHRITIS, RIGHT KNEE: ICD-10-CM

## 2023-09-13 DIAGNOSIS — R73.03 PRE-DIABETES: ICD-10-CM

## 2023-09-13 PROCEDURE — 90694 VACC AIIV4 NO PRSRV 0.5ML IM: CPT | Performed by: INTERNAL MEDICINE

## 2023-09-13 PROCEDURE — 90471 IMMUNIZATION ADMIN: CPT | Performed by: INTERNAL MEDICINE

## 2023-09-13 PROCEDURE — 1123F ACP DISCUSS/DSCN MKR DOCD: CPT | Performed by: INTERNAL MEDICINE

## 2023-09-13 PROCEDURE — 99214 OFFICE O/P EST MOD 30 MIN: CPT | Performed by: INTERNAL MEDICINE

## 2023-09-13 RX ORDER — METFORMIN HYDROCHLORIDE 500 MG/1
500 TABLET, EXTENDED RELEASE ORAL
Qty: 90 TABLET | Refills: 0 | Status: SHIPPED | OUTPATIENT
Start: 2023-09-13

## 2023-09-13 RX ORDER — MELOXICAM 15 MG/1
15 TABLET ORAL DAILY
Qty: 30 TABLET | Refills: 2 | Status: SHIPPED | OUTPATIENT
Start: 2023-09-13 | End: 2023-09-13

## 2023-09-13 NOTE — PROGRESS NOTES
ROOM # 10  Identified pt with two pt identifiers(name and ). Reviewed record in preparation for visit and have obtained necessary documentation. Chief Complaint   Patient presents with    Hypertension      Yuvonne Holter. preferred language for health care discussion is english/other. Is the patient using any DME equipment during [address]? no    Yuvonne Holter. is due for:  Health Maintenance Due   Topic    HIV screen     COVID-19 Vaccine (3 - [employer] series)    Pneumococcal 65+ years Vaccine (2 - PCV)    Shingles vaccine (2 of 2)    Flu vaccine (1)    AAA screen        Health Maintenance reviewed and discussed per provider  Please order/place referral if appropriate. 1. For patients aged 43-73: Has the patient had a colonoscopy? yes  If the patient is female:    2. For patients aged 43-66: Has the patient had a mammogram within the past 2 years? N/A    3. For patients aged 21-65: Has the patient had a pap smear? N/A    Advance Directive:  1. Do you have an advance directive in place? Patient Reply:no    2. If not, would you like material regarding how to put one in place? Patient Reply:no    Coordination of Care:  1. Have you been to the ER, urgent care clinic since your last visit? Hospitalized since your last visit? Patient Reply:no    2. Have you seen or consulted any other health care providers outside of the [address] [place] [address] since your last visit? Include any pap smears or colon. Patient Reply:no    Patient is accompanied by  I have received verbal consent from Yuvonne Holter. to discuss any/all medical information while they are present in the room.     Depression Screenin/5/2023     3:50 PM   PHQ-9    Little interest or pleasure in doing things 0   Feeling down, depressed, or hopeless 0   PHQ-2 Score 0   PHQ-9 Total Score 0           Recent Travel Screening and Travel History documentation     Travel Screening     No screening recorded since 23 0000       Travel History

## 2023-09-13 NOTE — PROGRESS NOTES
Subjective:      Patient ID: Xuan Joseph. is a 70 y.o. male. Follow up    Patient continues to complain about right hip pain and right knee pain he was already seen by orthopedics and he has an advanced patellofemoral disease and also right hip arthritis. He received injections with moderate relief. Advised not to use NSAIDs due to history of kidney disease. Also I reviewed work-up for LFTs back in 2021, liver ultrasound showing fatty liver, he had no iron profile, and possibly have autoimmune work-up since he had an electrophoresis back in 2021 showing elevated immunoglobulin which seems to be persistent over time. Pre diabetes  Started Metformin  mg daily on 9/13/2023    NAFLD   US 2021: Increased hepatic echogenicity, diffuse hepatocellular disease - most commonly seen in fatty infiltration. No discrete mass or biliary dilatation. Hepatitis panel negative. Aldolase neg. Iron profile ordered. Elevated protein, BUN  Protein electrophoresis: SPE pattern reflects a polyclonal increase in gamma globulin. Hypergammaglobulinemia is found in a wide variety of infectious, non-infectious, and autoimmune disease states. Evidence of monoclonal protein is not apparent. Episodic abdominal pain  H. pylori was neg, will need upper endoscopy if pepcid does not help. No current symptoms. Advanced right hip arthritis  Advanced patellofemoral disease   Radiographs obtained 7/25/2023   MRI obtained. FU Orthopedics. Essential hypertension  Amlodipine, hydrochlorothiazide. Hyperlipidemia  Atorvastatin. Started around 2018. Allergic rhinitis  Zyrtec as needed. History of vitamin D deficiency  Not taking supplements currently. Family history of colon cancer  Patient's mother by the age of 76years old. Last patient colonoscopy 2015 he was told it was normal.  No weight loss, no blood in the stools, no constipation. Hx of Hemorrhoids   No current complaints.      Erectile

## 2023-09-13 NOTE — PATIENT INSTRUCTIONS
Francis Fraire PA-C   [address] - Mani Garcia MD  [address], [address] [place] [address]  [phone]    Call [phone] for ultrasound of abdominal aorta

## 2023-09-27 ENCOUNTER — HOSPITAL ENCOUNTER (OUTPATIENT)
Facility: HOSPITAL | Age: 63
Discharge: HOME | End: 2023-09-30
Attending: INTERNAL MEDICINE
Payer: COMMERCIAL

## 2023-09-27 DIAGNOSIS — Z13.6 SCREENING FOR AAA (ABDOMINAL AORTIC ANEURYSM): ICD-10-CM

## 2023-09-27 DIAGNOSIS — M25.561 RIGHT KNEE PAIN, UNSPECIFIED CHRONICITY: ICD-10-CM

## 2023-09-27 DIAGNOSIS — M23.91 LOCKING OF RIGHT KNEE: ICD-10-CM

## 2023-09-27 PROCEDURE — 73721 MRI JNT OF LWR EXTRE W/O DYE: CPT

## 2023-09-27 PROCEDURE — 76706 US ABDL AORTA SCREEN AAA: CPT

## 2023-10-03 ENCOUNTER — OFFICE VISIT (OUTPATIENT)
Facility: CLINIC | Age: 63
End: 2023-10-03
Payer: COMMERCIAL

## 2023-10-03 VITALS
OXYGEN SATURATION: 98 % | RESPIRATION RATE: 16 BRPM | WEIGHT: 211 LBS | TEMPERATURE: 97.2 F | BODY MASS INDEX: 25.69 KG/M2 | HEIGHT: 76 IN | SYSTOLIC BLOOD PRESSURE: 112 MMHG | HEART RATE: 78 BPM | DIASTOLIC BLOOD PRESSURE: 73 MMHG

## 2023-10-03 DIAGNOSIS — E11.9 TYPE 2 DIABETES MELLITUS WITHOUT COMPLICATION, WITHOUT LONG-TERM CURRENT USE OF INSULIN (HCC): ICD-10-CM

## 2023-10-03 DIAGNOSIS — E55.9 VITAMIN D DEFICIENCY: ICD-10-CM

## 2023-10-03 DIAGNOSIS — M16.11 OSTEOARTHRITIS OF RIGHT HIP, UNSPECIFIED OSTEOARTHRITIS TYPE: Primary | ICD-10-CM

## 2023-10-03 LAB — HBA1C MFR BLD: 6 %

## 2023-10-03 PROCEDURE — 1123F ACP DISCUSS/DSCN MKR DOCD: CPT | Performed by: INTERNAL MEDICINE

## 2023-10-03 PROCEDURE — 3044F HG A1C LEVEL LT 7.0%: CPT | Performed by: INTERNAL MEDICINE

## 2023-10-03 PROCEDURE — 83036 HEMOGLOBIN GLYCOSYLATED A1C: CPT | Performed by: INTERNAL MEDICINE

## 2023-10-03 PROCEDURE — 99214 OFFICE O/P EST MOD 30 MIN: CPT | Performed by: INTERNAL MEDICINE

## 2023-10-03 RX ORDER — ERGOCALCIFEROL 1.25 MG/1
50000 CAPSULE ORAL WEEKLY
Qty: 14 CAPSULE | Refills: 0 | Status: SHIPPED | OUTPATIENT
Start: 2023-10-03 | End: 2024-01-03

## 2023-11-14 ENCOUNTER — OFFICE VISIT (OUTPATIENT)
Facility: CLINIC | Age: 63
End: 2023-11-14
Payer: COMMERCIAL

## 2023-11-14 VITALS
WEIGHT: 210 LBS | BODY MASS INDEX: 25.57 KG/M2 | DIASTOLIC BLOOD PRESSURE: 64 MMHG | SYSTOLIC BLOOD PRESSURE: 122 MMHG | HEIGHT: 76 IN | RESPIRATION RATE: 18 BRPM | HEART RATE: 89 BPM | OXYGEN SATURATION: 99 %

## 2023-11-14 DIAGNOSIS — I10 ESSENTIAL HYPERTENSION: ICD-10-CM

## 2023-11-14 DIAGNOSIS — E55.9 VITAMIN D DEFICIENCY: ICD-10-CM

## 2023-11-14 DIAGNOSIS — J30.1 ALLERGIC RHINITIS DUE TO POLLEN, UNSPECIFIED SEASONALITY: Primary | ICD-10-CM

## 2023-11-14 DIAGNOSIS — R74.8 ELEVATED LIVER ENZYMES: ICD-10-CM

## 2023-11-14 DIAGNOSIS — E78.5 HYPERLIPIDEMIA, UNSPECIFIED HYPERLIPIDEMIA TYPE: ICD-10-CM

## 2023-11-14 DIAGNOSIS — R73.03 PRE-DIABETES: ICD-10-CM

## 2023-11-14 PROCEDURE — 3074F SYST BP LT 130 MM HG: CPT | Performed by: INTERNAL MEDICINE

## 2023-11-14 PROCEDURE — 3078F DIAST BP <80 MM HG: CPT | Performed by: INTERNAL MEDICINE

## 2023-11-14 PROCEDURE — 99214 OFFICE O/P EST MOD 30 MIN: CPT | Performed by: INTERNAL MEDICINE

## 2023-11-14 PROCEDURE — 1123F ACP DISCUSS/DSCN MKR DOCD: CPT | Performed by: INTERNAL MEDICINE

## 2023-11-14 RX ORDER — ERGOCALCIFEROL 1.25 MG/1
50000 CAPSULE ORAL WEEKLY
Qty: 8 CAPSULE | Refills: 0 | Status: SHIPPED | OUTPATIENT
Start: 2023-11-14 | End: 2024-01-03

## 2023-11-14 RX ORDER — CETIRIZINE HYDROCHLORIDE 10 MG/1
10 TABLET ORAL DAILY
Qty: 30 TABLET | Refills: 1 | Status: SHIPPED | OUTPATIENT
Start: 2023-11-14 | End: 2024-01-13

## 2023-11-14 RX ORDER — ATORVASTATIN CALCIUM 20 MG/1
20 TABLET, FILM COATED ORAL EVERY MORNING
Qty: 90 TABLET | Refills: 1 | Status: SHIPPED | OUTPATIENT
Start: 2023-11-14

## 2023-11-14 RX ORDER — AMLODIPINE BESYLATE 10 MG/1
10 TABLET ORAL EVERY MORNING
Qty: 90 TABLET | Refills: 1 | Status: SHIPPED | OUTPATIENT
Start: 2023-11-14

## 2023-11-14 RX ORDER — HYDROCHLOROTHIAZIDE 25 MG/1
25 TABLET ORAL DAILY
Qty: 90 TABLET | Refills: 1 | Status: SHIPPED | OUTPATIENT
Start: 2023-11-14

## 2023-11-14 RX ORDER — METFORMIN HYDROCHLORIDE 500 MG/1
500 TABLET, EXTENDED RELEASE ORAL
Qty: 90 TABLET | Refills: 1 | Status: SHIPPED | OUTPATIENT
Start: 2023-11-14

## 2023-11-14 ASSESSMENT — ENCOUNTER SYMPTOMS
SHORTNESS OF BREATH: 0
ABDOMINAL PAIN: 0

## 2023-11-14 NOTE — PROGRESS NOTES
Subjective:      Patient ID: Yuvonne Holter. is a 70 y.o. male. Follow up    Patient continues to drink alcohol on the weekends, explained again about liver enzymes. He is also continue to eat fatty foods. Educated about the consequences of fatty liver, cirrhosis and liver cancer. Patient is aware and will try to stop. He has no other complaints or concerns today. He has not had An appointment with orthopedic for the right knee pain. Pre diabetes  Started Metformin  mg daily on 9/13/2023     NAFLD   US 2021: Increased hepatic echogenicity, diffuse hepatocellular disease - most commonly seen in fatty infiltration. No discrete mass or biliary dilatation. Hepatitis panel negative. Aldolase neg. Iron profile ordered. Elevated protein, BUN  Protein electrophoresis: SPE pattern reflects a polyclonal increase in gamma globulin. Hypergammaglobulinemia is found in a wide variety of infectious, non-infectious, and autoimmune disease states. Evidence of monoclonal protein is not apparent. Episodic abdominal pain  H. pylori was neg, will need upper endoscopy if pepcid does not help. No current symptoms. Advanced right hip arthritis  Advanced patellofemoral disease   Radiographs obtained 7/25/2023   MRI obtained. FU Orthopedics. Essential hypertension  Amlodipine, hydrochlorothiazide. Hyperlipidemia  Atorvastatin. Started around 2018. Allergic rhinitis  Zyrtec as needed. History of vitamin D deficiency  Not taking supplements currently. Family history of colon cancer  Patient's mother by the age of 76years old. Last patient colonoscopy 2015 he was told it was normal.  No weight loss, no blood in the stools, no constipation. Hx of Hemorrhoids   No current complaints. Erectile dysfunction  Viagra as needed, not currently using. Review of Systems   Constitutional:  Negative for chills and fever. Respiratory:  Negative for shortness of breath.

## 2023-11-14 NOTE — PROGRESS NOTES
1. \"Have you been to the ER, urgent care clinic since your last visit? Hospitalized since your last visit? \"No    2. \"Have you seen or consulted any other health care providers outside of the [address] [place] [address] since your last visit? \" No    3. For patients aged 43-73: Has the patient had a colonoscopy / FIT/ Cologuard? Yes      If the patient is female:    4. For patients aged 43-66: Has the patient had a mammogram within the past 2 years? Not applicable      5. For patients aged 21-65: Has the patient had a pap smear?  Not applicable

## 2023-11-15 LAB
A/G RATIO: 1.2 RATIO (ref 1.1–2.6)
ALBUMIN SERPL-MCNC: 4.7 G/DL (ref 3.5–5)
ALP BLD-CCNC: 67 U/L (ref 40–125)
ALT SERPL-CCNC: 33 U/L (ref 5–40)
AST SERPL-CCNC: 40 U/L (ref 10–37)
BILIRUB SERPL-MCNC: 0.7 MG/DL (ref 0.2–1.2)
BILIRUBIN DIRECT: <0.2 MG/DL (ref 0–0.3)
GLOBULIN: 3.9 G/DL (ref 2–4)
HIV -1/0/2 AG/AB WITH REFLEX: NON REACTIVE
HIV INTERPRETATION: NORMAL
TOTAL PROTEIN: 8.6 G/DL (ref 6.2–8.1)

## 2023-11-28 ENCOUNTER — OFFICE VISIT (OUTPATIENT)
Age: 63
End: 2023-11-28
Payer: COMMERCIAL

## 2023-11-28 VITALS — HEIGHT: 76 IN | WEIGHT: 216 LBS | BODY MASS INDEX: 26.3 KG/M2

## 2023-11-28 DIAGNOSIS — M16.11 PRIMARY OSTEOARTHRITIS OF RIGHT HIP: Primary | ICD-10-CM

## 2023-11-28 DIAGNOSIS — M17.11 PRIMARY OSTEOARTHRITIS OF RIGHT KNEE: ICD-10-CM

## 2023-11-28 DIAGNOSIS — E11.29 TYPE 2 DIABETES MELLITUS WITH OTHER DIABETIC KIDNEY COMPLICATION, WITHOUT LONG-TERM CURRENT USE OF INSULIN (HCC): ICD-10-CM

## 2023-11-28 DIAGNOSIS — I10 HYPERTENSION, UNSPECIFIED TYPE: ICD-10-CM

## 2023-11-28 DIAGNOSIS — Z72.0 TOBACCO ABUSE: ICD-10-CM

## 2023-11-28 PROBLEM — E11.9 DIABETES MELLITUS (HCC): Status: ACTIVE | Noted: 2023-11-28

## 2023-11-28 PROCEDURE — 20610 DRAIN/INJ JOINT/BURSA W/O US: CPT | Performed by: ORTHOPAEDIC SURGERY

## 2023-11-28 PROCEDURE — 99214 OFFICE O/P EST MOD 30 MIN: CPT | Performed by: ORTHOPAEDIC SURGERY

## 2023-11-28 PROCEDURE — 1123F ACP DISCUSS/DSCN MKR DOCD: CPT | Performed by: ORTHOPAEDIC SURGERY

## 2023-11-28 PROCEDURE — 3044F HG A1C LEVEL LT 7.0%: CPT | Performed by: ORTHOPAEDIC SURGERY

## 2023-11-28 RX ORDER — TRIAMCINOLONE ACETONIDE 40 MG/ML
40 INJECTION, SUSPENSION INTRA-ARTICULAR; INTRAMUSCULAR ONCE
Status: COMPLETED | OUTPATIENT
Start: 2023-11-28 | End: 2023-11-28

## 2023-11-28 RX ADMIN — TRIAMCINOLONE ACETONIDE 40 MG: 40 INJECTION, SUSPENSION INTRA-ARTICULAR; INTRAMUSCULAR at 09:53

## 2023-11-29 ENCOUNTER — TELEPHONE (OUTPATIENT)
Facility: CLINIC | Age: 63
End: 2023-11-29

## 2023-11-29 NOTE — TELEPHONE ENCOUNTER
Called patient to inform him of lab results.    Needs to continue avoiding fatty food and alcohol, liver will turn into cirrhosis if he does change his lifestyle and diet.   HIV test was negative.  LVM

## 2023-12-01 ENCOUNTER — TELEPHONE (OUTPATIENT)
Facility: CLINIC | Age: 63
End: 2023-12-01

## 2023-12-01 NOTE — TELEPHONE ENCOUNTER
Called patient and informed him of the following below per Dr. Scott      Needs to continue avoiding fatty food and alcohol, liver will turn into cirrhosis if he does change his lifestyle and diet.   HIV test was negative.

## 2023-12-01 NOTE — TELEPHONE ENCOUNTER
----- Message from Emeka Torrez MD sent at 11/26/2023  1:13 PM EST -----  Call:  Needs to continue avoiding fatty food and alcohol, liver will turn into cirrhosis if he does change his lifestyle and diet.   HIV test was negative.

## 2024-01-09 ENCOUNTER — OFFICE VISIT (OUTPATIENT)
Age: 64
End: 2024-01-09
Payer: COMMERCIAL

## 2024-01-09 VITALS — BODY MASS INDEX: 25.94 KG/M2 | HEIGHT: 76 IN | WEIGHT: 213 LBS

## 2024-01-09 DIAGNOSIS — I10 HYPERTENSION, UNSPECIFIED TYPE: ICD-10-CM

## 2024-01-09 DIAGNOSIS — M16.11 PRIMARY OSTEOARTHRITIS OF RIGHT HIP: Primary | ICD-10-CM

## 2024-01-09 DIAGNOSIS — M17.11 PRIMARY OSTEOARTHRITIS OF RIGHT KNEE: ICD-10-CM

## 2024-01-09 DIAGNOSIS — E11.29 TYPE 2 DIABETES MELLITUS WITH OTHER DIABETIC KIDNEY COMPLICATION, WITHOUT LONG-TERM CURRENT USE OF INSULIN (HCC): ICD-10-CM

## 2024-01-09 PROCEDURE — 1123F ACP DISCUSS/DSCN MKR DOCD: CPT | Performed by: ORTHOPAEDIC SURGERY

## 2024-01-09 PROCEDURE — 99213 OFFICE O/P EST LOW 20 MIN: CPT | Performed by: ORTHOPAEDIC SURGERY

## 2024-01-09 NOTE — PROGRESS NOTES
Patient: Robert Clements Jr.                MRN: [id number]       SSN: [id number]  YOB: 1960        AGE: 63 y.o.        SEX: male  BMI: Body mass index is 25.93 kg/m².    PCP: Emeka Brown MD  01/09/24    Chief Complaint: Knee Pain (Right knee) and Hip Pain (Right hip )      1. Primary osteoarthritis of right hip  -     FL GUIDED NEEDLE PLACEMENT; Future  2. Primary osteoarthritis of right knee  3. Type 2 diabetes mellitus with other diabetic kidney complication, without long-term current use of insulin (HCC)  4. Hypertension, unspecified type        HPI:  Robert Clements Jr. is a 63 y.o. male with chief complaint of   Chief Complaint   Patient presents with    Knee Pain     Right knee    Hip Pain     Right hip      -Right hip intra-articular cortisone injection 8/18/2023 with 4 days of relief  -Right knee intra-articular cortisone injection 11/28/2023 with 3 weeks of relief    Patient with right knee and right hip pain for about a year.  He saw JR Lofton 3 months ago and was set up for a hip injection which he had recently and reports he got 4 days of improvement where he was walking without a limp.  He was also prescribed an MRI as well as Mobic.  Patient does not feel like the Mobic helped significantly, only a small amount.  Patient has diabetes, hypertension and smokes Black and milds.      IMAGING:  Imaging read by myself and interpreted as follows:    September 27, 2023:  MRI of the right knee demonstrates grade IV chondromalacia of the medial patellar facet with subchondral edema in the area.  There is also grade IV chondromalacia in the anterior medial femoral condyle with underlying subchondral edema.  Lateral compartment is maintained, there are no meniscal tears and the cruciate and collateral ligaments are intact.    July 25, 2023:  4 view x-rays of the right knee including AP, lateral, sunrise and notch view taken at the [address] location demonstrate subchondral edema,

## 2024-01-30 ENCOUNTER — HOSPITAL ENCOUNTER (OUTPATIENT)
Facility: HOSPITAL | Age: 64
Discharge: HOME | End: 2024-02-02
Attending: ORTHOPAEDIC SURGERY
Payer: COMMERCIAL

## 2024-01-30 DIAGNOSIS — M16.11 PRIMARY OSTEOARTHRITIS OF RIGHT HIP: ICD-10-CM

## 2024-01-30 PROCEDURE — 2500000003 HC RX 250 WO HCPCS: Performed by: ORTHOPAEDIC SURGERY

## 2024-01-30 PROCEDURE — 20610 DRAIN/INJ JOINT/BURSA W/O US: CPT

## 2024-01-30 PROCEDURE — 6360000002 HC RX W HCPCS: Performed by: ORTHOPAEDIC SURGERY

## 2024-01-30 PROCEDURE — 6360000004 HC RX CONTRAST MEDICATION: Performed by: ORTHOPAEDIC SURGERY

## 2024-01-30 PROCEDURE — 77002 NEEDLE LOCALIZATION BY XRAY: CPT

## 2024-01-30 RX ORDER — LIDOCAINE HYDROCHLORIDE 10 MG/ML
10 INJECTION, SOLUTION EPIDURAL; INFILTRATION; INTRACAUDAL; PERINEURAL ONCE
Status: COMPLETED | OUTPATIENT
Start: 2024-01-30 | End: 2024-01-30

## 2024-01-30 RX ORDER — TRIAMCINOLONE ACETONIDE 40 MG/ML
40 INJECTION, SUSPENSION INTRA-ARTICULAR; INTRAMUSCULAR ONCE
Status: COMPLETED | OUTPATIENT
Start: 2024-01-30 | End: 2024-01-30

## 2024-01-30 RX ORDER — IOPAMIDOL 408 MG/ML
10 INJECTION, SOLUTION INTRATHECAL ONCE
Status: COMPLETED | OUTPATIENT
Start: 2024-01-30 | End: 2024-01-30

## 2024-01-30 RX ADMIN — TRIAMCINOLONE ACETONIDE 40 MG: 40 INJECTION, SUSPENSION INTRA-ARTICULAR; INTRAMUSCULAR at 09:10

## 2024-01-30 RX ADMIN — IOPAMIDOL 10 ML: 408 INJECTION, SOLUTION INTRATHECAL at 09:07

## 2024-01-30 RX ADMIN — LIDOCAINE HYDROCHLORIDE 10 ML: 10 INJECTION, SOLUTION EPIDURAL; INFILTRATION; INTRACAUDAL; PERINEURAL at 09:10

## 2024-02-07 DIAGNOSIS — E55.9 VITAMIN D DEFICIENCY: ICD-10-CM

## 2024-02-07 DIAGNOSIS — J30.1 ALLERGIC RHINITIS DUE TO POLLEN, UNSPECIFIED SEASONALITY: ICD-10-CM

## 2024-02-08 RX ORDER — CETIRIZINE HYDROCHLORIDE 10 MG/1
10 TABLET ORAL DAILY
Qty: 90 TABLET | Refills: 1 | OUTPATIENT
Start: 2024-02-08 | End: 2024-04-08

## 2024-02-08 RX ORDER — ERGOCALCIFEROL 1.25 MG/1
50000 CAPSULE ORAL WEEKLY
Qty: 8 CAPSULE | Refills: 0 | OUTPATIENT
Start: 2024-02-08 | End: 2024-03-29

## 2024-02-08 NOTE — TELEPHONE ENCOUNTER
Last Appointment:  11/14/2023  Future Appointments   Date Time Provider Department Center   2/15/2024  7:30 AM Emeka Brown MD [facility]

## 2024-03-19 ENCOUNTER — TELEPHONE (OUTPATIENT)
Age: 64
End: 2024-03-19

## 2024-03-19 ENCOUNTER — OFFICE VISIT (OUTPATIENT)
Facility: CLINIC | Age: 64
End: 2024-03-19
Payer: COMMERCIAL

## 2024-03-19 VITALS
HEART RATE: 82 BPM | BODY MASS INDEX: 26.42 KG/M2 | HEIGHT: 76 IN | WEIGHT: 217 LBS | RESPIRATION RATE: 14 BRPM | OXYGEN SATURATION: 97 % | TEMPERATURE: 96.8 F | DIASTOLIC BLOOD PRESSURE: 81 MMHG | SYSTOLIC BLOOD PRESSURE: 133 MMHG

## 2024-03-19 DIAGNOSIS — R79.89 ELEVATED LFTS: ICD-10-CM

## 2024-03-19 DIAGNOSIS — R73.03 PRE-DIABETES: ICD-10-CM

## 2024-03-19 DIAGNOSIS — I10 ESSENTIAL HYPERTENSION: ICD-10-CM

## 2024-03-19 DIAGNOSIS — I48.91 ATRIAL FIBRILLATION, UNSPECIFIED TYPE (HCC): ICD-10-CM

## 2024-03-19 DIAGNOSIS — R55 NEAR SYNCOPE: ICD-10-CM

## 2024-03-19 DIAGNOSIS — R55 SYNCOPE, UNSPECIFIED SYNCOPE TYPE: ICD-10-CM

## 2024-03-19 DIAGNOSIS — N52.9 ERECTILE DYSFUNCTION, UNSPECIFIED ERECTILE DYSFUNCTION TYPE: ICD-10-CM

## 2024-03-19 DIAGNOSIS — J30.1 SEASONAL ALLERGIC RHINITIS DUE TO POLLEN: Primary | ICD-10-CM

## 2024-03-19 DIAGNOSIS — E78.5 HYPERLIPIDEMIA, UNSPECIFIED HYPERLIPIDEMIA TYPE: ICD-10-CM

## 2024-03-19 PROCEDURE — 1123F ACP DISCUSS/DSCN MKR DOCD: CPT | Performed by: INTERNAL MEDICINE

## 2024-03-19 PROCEDURE — 99214 OFFICE O/P EST MOD 30 MIN: CPT | Performed by: INTERNAL MEDICINE

## 2024-03-19 PROCEDURE — 3079F DIAST BP 80-89 MM HG: CPT | Performed by: INTERNAL MEDICINE

## 2024-03-19 PROCEDURE — 3075F SYST BP GE 130 - 139MM HG: CPT | Performed by: INTERNAL MEDICINE

## 2024-03-19 PROCEDURE — 93000 ELECTROCARDIOGRAM COMPLETE: CPT | Performed by: INTERNAL MEDICINE

## 2024-03-19 RX ORDER — METFORMIN HYDROCHLORIDE 500 MG/1
500 TABLET, EXTENDED RELEASE ORAL
Qty: 90 TABLET | Refills: 1 | Status: SHIPPED | OUTPATIENT
Start: 2024-03-19

## 2024-03-19 RX ORDER — AMLODIPINE BESYLATE 10 MG/1
10 TABLET ORAL EVERY MORNING
Qty: 90 TABLET | Refills: 1 | Status: SHIPPED | OUTPATIENT
Start: 2024-03-19

## 2024-03-19 RX ORDER — CYCLOBENZAPRINE HCL 10 MG
1 TABLET ORAL 2 TIMES DAILY PRN
COMMUNITY
End: 2024-03-19

## 2024-03-19 RX ORDER — BLOOD-GLUCOSE METER
1 KIT MISCELLANEOUS DAILY
Qty: 1 KIT | Refills: 0 | Status: SHIPPED | OUTPATIENT
Start: 2024-03-19

## 2024-03-19 RX ORDER — ATORVASTATIN CALCIUM 20 MG/1
20 TABLET, FILM COATED ORAL EVERY MORNING
Qty: 90 TABLET | Refills: 1 | Status: SHIPPED | OUTPATIENT
Start: 2024-03-19

## 2024-03-19 RX ORDER — SILDENAFIL 100 MG/1
100 TABLET, FILM COATED ORAL DAILY PRN
Qty: 30 TABLET | Refills: 0 | Status: SHIPPED | OUTPATIENT
Start: 2024-03-19

## 2024-03-19 RX ORDER — CETIRIZINE HYDROCHLORIDE 10 MG/1
10 TABLET ORAL DAILY PRN
Qty: 30 TABLET | Refills: 0 | Status: SHIPPED | OUTPATIENT
Start: 2024-03-19

## 2024-03-19 RX ORDER — HYDROCHLOROTHIAZIDE 25 MG/1
25 TABLET ORAL DAILY
Qty: 90 TABLET | Refills: 1 | Status: SHIPPED | OUTPATIENT
Start: 2024-03-19

## 2024-03-19 ASSESSMENT — PATIENT HEALTH QUESTIONNAIRE - PHQ9
SUM OF ALL RESPONSES TO PHQ QUESTIONS 1-9: 0
SUM OF ALL RESPONSES TO PHQ QUESTIONS 1-9: 0
SUM OF ALL RESPONSES TO PHQ9 QUESTIONS 1 & 2: 0
1. LITTLE INTEREST OR PLEASURE IN DOING THINGS: NOT AT ALL
SUM OF ALL RESPONSES TO PHQ QUESTIONS 1-9: 0
SUM OF ALL RESPONSES TO PHQ QUESTIONS 1-9: 0
2. FEELING DOWN, DEPRESSED OR HOPELESS: NOT AT ALL

## 2024-03-19 ASSESSMENT — ENCOUNTER SYMPTOMS
SHORTNESS OF BREATH: 0
ABDOMINAL PAIN: 0

## 2024-03-19 NOTE — TELEPHONE ENCOUNTER
Contacted pt at  number.  Two patient Identifiers confirmed.  Pt scheduled for 3/21/24.  Pt verbalized understanding.

## 2024-03-19 NOTE — TELEPHONE ENCOUNTER
----- Message from Milan VALLES MD sent at 3/19/2024 12:58 PM EDT -----  Regarding: RE: possible A fib and near syncope 2 days ago.  Thank you    Alma: Please make arrangement to see him in clinic with me SALEEMEMILEE Yates    ----- Message -----  From: Emeka Brown MD  Sent: 3/19/2024  10:08 AM EDT  To: Milan VALLES MD  Subject: possible A fib and near syncope 2 days ago.      He had a near syncope today's appointment ultrasound.  He could have been induced by heat.  EKG was to be in A-fib.  I agree with there is some things looking like p-wave as well.  [id number] 2 and also prediabetic.  Heart rate normal rate.  I will start him Eliquis for a couple weeks until he sees you in the office.  She can stop Eliquis if cardiac workup is reassuring.  Thank you!

## 2024-03-21 ENCOUNTER — OFFICE VISIT (OUTPATIENT)
Age: 64
End: 2024-03-21

## 2024-03-21 VITALS
WEIGHT: 220 LBS | BODY MASS INDEX: 26.79 KG/M2 | SYSTOLIC BLOOD PRESSURE: 127 MMHG | OXYGEN SATURATION: 96 % | DIASTOLIC BLOOD PRESSURE: 86 MMHG | HEIGHT: 76 IN | HEART RATE: 120 BPM

## 2024-03-21 DIAGNOSIS — I48.91 ATRIAL FIBRILLATION, UNSPECIFIED TYPE (HCC): Primary | ICD-10-CM

## 2024-03-21 RX ORDER — CYCLOBENZAPRINE HCL 10 MG
1 TABLET ORAL
COMMUNITY

## 2024-03-21 RX ORDER — MELOXICAM 15 MG/1
15 TABLET ORAL DAILY
COMMUNITY
Start: 2024-02-07

## 2024-03-21 NOTE — PROGRESS NOTES
Identified pt with two pt identifiers(name and ). Reviewed record in preparation for visit and have obtained necessary documentation.    Robert Starr Fletcher presents today for   Chief Complaint   Patient presents with    New Patient     Artial Fibrill       Pt c/o DIZZINESS, SOB, CHEST PAIN/ PRESSURE, FATIGUE/WEAKNESS, SWELLING.             Robert Clements . preferred language for health care discussion is english/other.    Personal Protective Equipment:   Personal Protective Equipment was used including: mask-surgical and hands-gloves. Patient was placed on no precaution(s). Patient was masked.    Precautions:   Patient currently on None  Patient currently roomed with door closed.    Is someone accompanying this pt? no    Is the patient using any DME equipment during OV? no    Depression Screening:      3/19/2024     8:38 AM 2023     3:50 PM 2022     3:33 PM 2022     3:20 PM 3/17/2021    11:02 AM   PHQ-9 Questionaire   Little interest or pleasure in doing things 0 0 0 0 0   Feeling down, depressed, or hopeless 0 0 0 0 0   PHQ-9 Total Score 0 0 0 0 0        Learning Assessment:  No question data found.    Abuse Screening:       No data to display                   Fall Risk      2023     7:32 AM   Fall Risk   2 or more falls in past year? no   Fall with injury in past year? no         Pt currently taking Anticoagulant therapy? no  Pt currently taking Antiplatelet therapy? no    Coordination of Care:  1. Have you been to the ER, urgent care clinic since your last visit? Hospitalized since your last visit? no    2. Have you seen or consulted any other health care providers outside of the [facility] since your last visit? Include any pap smears or colon screening. no      Please see Red banners under Allergies and Med Rec to remove outside inquires. All correct information has been verified with patient and added to chart.     Medication's patient's would liked removed has been marked

## 2024-03-21 NOTE — PATIENT INSTRUCTIONS
Testing   Echo/ Nuclear Stress  Nuclear Stress Instructions-Nothing to eat or drink past midnight and no medicaitons the morning of cardiac testing.  **call office 3-5 days after testing is completed for results** Please ensure testing is completed prior to scheduled follow up appointment. If it is not completed your appointment may be rescheduled**        Other Testing  [employer]-( 30 days) will be calling you to confirm your address to send your Heart Monitor.   Please allow 7-10 business days for monitor to arrive at your home.  Customer Service for Car reviews:  [phone]

## 2024-03-21 NOTE — PROGRESS NOTES
[facility]    Robert Clements Jr. is 63 y.o. male     Patient is here today for cardiac evaluation  Denies prior history of MI or CHF  Patient was sent to me for evaluation of possible atrial fibrillation diagnosed on routine EKG at PCP office  Patient denies any awareness of any palpitation, presyncope or syncope  Upon questioning, he does tell me that he gets short of breath after walking less than 1 block.  He occasionally has chest discomfort however he describes this as a reflux.  He said he feels like it is in the center of the chest and sometimes goes into his left arm and numbness.  No exertional symptoms.  No nausea vomiting diaphoresis.  This happens probably once or twice a month.  Able to perform activity of daily living.  Main complaint is dyspnea.  No significant edema  Denies any nausea, vomiting, abdominal pain, fever, chills, sputum production. No hematuria or other bleeding complaints    Past Medical History:   Diagnosis Date    Arthritis     Diabetes (HCC)     HLD (hyperlipidemia)     Hypertension        Review of Systems:  Cardiac symptoms as noted above in HPI. All others negative.  Denies fatigue, malaise, skin rash, joint pain, blurring vision, photophobia, neck pain, hemoptysis, chronic cough, nausea, vomiting, hematuria, burning micturition, BRBPR, chronic headaches.    Current Outpatient Medications   Medication Sig    meloxicam (MOBIC) 15 MG tablet Take 1 tablet by mouth daily    hydroCHLOROthiazide (HYDRODIURIL) 25 MG tablet Take 1 tablet by mouth daily    atorvastatin (LIPITOR) 20 MG tablet Take 1 tablet by mouth every morning    amLODIPine (NORVASC) 10 MG tablet Take 1 tablet by mouth every morning    apixaban (ELIQUIS) 5 MG TABS tablet Take 1 tablet by mouth 2 times daily    cyclobenzaprine (FLEXERIL) 10 MG tablet 1 tablet    sildenafil (VIAGRA) 100 MG tablet Take 1 tablet by mouth daily as needed for Erectile

## 2024-03-26 ENCOUNTER — TELEPHONE (OUTPATIENT)
Facility: CLINIC | Age: 64
End: 2024-03-26

## 2024-03-26 ENCOUNTER — HOSPITAL ENCOUNTER (OUTPATIENT)
Facility: HOSPITAL | Age: 64
Discharge: HOME | End: 2024-03-29
Attending: INTERNAL MEDICINE
Payer: COMMERCIAL

## 2024-03-26 DIAGNOSIS — R55 NEAR SYNCOPE: ICD-10-CM

## 2024-03-26 PROCEDURE — 70450 CT HEAD/BRAIN W/O DYE: CPT

## 2024-03-26 NOTE — TELEPHONE ENCOUNTER
----- Message from Emeka Torrez MD sent at 3/25/2024 10:33 PM EDT -----  Work up did not show any other marked abnormality regarding cause of elevated liver enzymes, most likely is related to Fatty liver disease.  A1c is controlled, good job.

## 2024-03-28 ENCOUNTER — TELEPHONE (OUTPATIENT)
Age: 64
End: 2024-03-28

## 2024-03-28 NOTE — TELEPHONE ENCOUNTER
Attempted to contact pt at  number, no answer. Lvm for pt to return call to office at [phone]. Will continue to try to contact pt.

## 2024-03-29 NOTE — TELEPHONE ENCOUNTER
Contacted pt at  number. Two patient Identifiers confirmed. Pt stated he did not fait or fall since wearing the monitor. No other issues noted.

## 2024-05-03 ENCOUNTER — TELEPHONE (OUTPATIENT)
Age: 64
End: 2024-05-03

## 2024-05-03 NOTE — TELEPHONE ENCOUNTER
Attempted to contact pt at  number, no answer. Lvm for pt to return call to office at [phone]. Will continue to try to contact pt.

## 2024-05-03 NOTE — TELEPHONE ENCOUNTER
----- Message from Milan VALLES MD sent at 5/3/2024  8:57 AM EDT -----  Please inform patient regarding abnormal test findings.   Possibel a.fib    Start ASA     Thank you  SP

## 2024-05-07 NOTE — TELEPHONE ENCOUNTER
Attempted to contact pt at  number, no answer. Lvm on secure line per Dr Gonzalez to start taking aspirin 81 mg tab daily. Informed pt to contact office at  [phone] if further clarification was needed.

## 2024-06-13 ENCOUNTER — TELEPHONE (OUTPATIENT)
Facility: CLINIC | Age: 64
End: 2024-06-13

## 2024-06-13 NOTE — TELEPHONE ENCOUNTER
Patient is calling in asking if something can please be called in for his hip pain.  States he was prescribed something previously and can not remember the name.  He knows he has an appt next week but would like something before then.  Please send to the [facility] on file

## 2024-06-19 ENCOUNTER — HOSPITAL ENCOUNTER (OUTPATIENT)
Facility: HOSPITAL | Age: 64
Setting detail: SPECIMEN
Discharge: HOME | End: 2024-06-22
Payer: MEDICARE

## 2024-06-19 ENCOUNTER — OFFICE VISIT (OUTPATIENT)
Facility: CLINIC | Age: 64
End: 2024-06-19
Payer: MEDICARE

## 2024-06-19 VITALS
WEIGHT: 205 LBS | SYSTOLIC BLOOD PRESSURE: 120 MMHG | BODY MASS INDEX: 24.96 KG/M2 | DIASTOLIC BLOOD PRESSURE: 72 MMHG | HEART RATE: 86 BPM | TEMPERATURE: 97.4 F | OXYGEN SATURATION: 97 % | HEIGHT: 76 IN

## 2024-06-19 DIAGNOSIS — E78.5 HYPERLIPIDEMIA, UNSPECIFIED HYPERLIPIDEMIA TYPE: ICD-10-CM

## 2024-06-19 DIAGNOSIS — I10 ESSENTIAL HYPERTENSION: ICD-10-CM

## 2024-06-19 DIAGNOSIS — E11.9 TYPE 2 DIABETES MELLITUS WITHOUT COMPLICATION, WITHOUT LONG-TERM CURRENT USE OF INSULIN (HCC): ICD-10-CM

## 2024-06-19 DIAGNOSIS — N52.9 ERECTILE DYSFUNCTION, UNSPECIFIED ERECTILE DYSFUNCTION TYPE: ICD-10-CM

## 2024-06-19 DIAGNOSIS — E55.9 VITAMIN D DEFICIENCY: ICD-10-CM

## 2024-06-19 DIAGNOSIS — I10 ESSENTIAL HYPERTENSION: Primary | ICD-10-CM

## 2024-06-19 LAB
ALBUMIN SERPL-MCNC: 4.1 G/DL (ref 3.4–5)
ALBUMIN/GLOB SERPL: 0.9 (ref 0.8–1.7)
ALP SERPL-CCNC: 63 U/L (ref 45–117)
ALT SERPL-CCNC: 37 U/L (ref 16–61)
ANION GAP SERPL CALC-SCNC: 8 MMOL/L (ref 3–18)
AST SERPL-CCNC: 35 U/L (ref 10–38)
BILIRUB SERPL-MCNC: 0.8 MG/DL (ref 0.2–1)
BUN SERPL-MCNC: 23 MG/DL (ref 7–18)
BUN/CREAT SERPL: 21 (ref 12–20)
CALCIUM SERPL-MCNC: 9.9 MG/DL (ref 8.5–10.1)
CHLORIDE SERPL-SCNC: 102 MMOL/L (ref 100–111)
CHOLEST SERPL-MCNC: 189 MG/DL
CO2 SERPL-SCNC: 28 MMOL/L (ref 21–32)
CREAT SERPL-MCNC: 1.09 MG/DL (ref 0.6–1.3)
CREAT UR-MCNC: 410 MG/DL (ref 30–125)
EST. AVERAGE GLUCOSE BLD GHB EST-MCNC: 123 MG/DL
GLOBULIN SER CALC-MCNC: 4.4 G/DL (ref 2–4)
GLUCOSE SERPL-MCNC: 121 MG/DL (ref 74–99)
HBA1C MFR BLD: 5.9 % (ref 4.2–5.6)
HDLC SERPL-MCNC: 81 MG/DL (ref 40–60)
HDLC SERPL: 2.3 (ref 0–5)
LDLC SERPL CALC-MCNC: 99 MG/DL (ref 0–100)
LIPID PANEL: ABNORMAL
MICROALBUMIN UR-MCNC: 5.45 MG/DL (ref 0–3)
MICROALBUMIN/CREAT UR-RTO: 13 MG/G (ref 0–30)
POTASSIUM SERPL-SCNC: 3.7 MMOL/L (ref 3.5–5.5)
PROT SERPL-MCNC: 8.5 G/DL (ref 6.4–8.2)
SODIUM SERPL-SCNC: 138 MMOL/L (ref 136–145)
TRIGL SERPL-MCNC: 45 MG/DL
VLDLC SERPL CALC-MCNC: 9 MG/DL

## 2024-06-19 PROCEDURE — 82570 ASSAY OF URINE CREATININE: CPT

## 2024-06-19 PROCEDURE — 3074F SYST BP LT 130 MM HG: CPT | Performed by: INTERNAL MEDICINE

## 2024-06-19 PROCEDURE — 2022F DILAT RTA XM EVC RTNOPTHY: CPT | Performed by: INTERNAL MEDICINE

## 2024-06-19 PROCEDURE — 1123F ACP DISCUSS/DSCN MKR DOCD: CPT | Performed by: INTERNAL MEDICINE

## 2024-06-19 PROCEDURE — 3017F COLORECTAL CA SCREEN DOC REV: CPT | Performed by: INTERNAL MEDICINE

## 2024-06-19 PROCEDURE — 3078F DIAST BP <80 MM HG: CPT | Performed by: INTERNAL MEDICINE

## 2024-06-19 PROCEDURE — 83036 HEMOGLOBIN GLYCOSYLATED A1C: CPT

## 2024-06-19 PROCEDURE — 36415 COLL VENOUS BLD VENIPUNCTURE: CPT

## 2024-06-19 PROCEDURE — 3044F HG A1C LEVEL LT 7.0%: CPT | Performed by: INTERNAL MEDICINE

## 2024-06-19 PROCEDURE — G8420 CALC BMI NORM PARAMETERS: HCPCS | Performed by: INTERNAL MEDICINE

## 2024-06-19 PROCEDURE — G8427 DOCREV CUR MEDS BY ELIG CLIN: HCPCS | Performed by: INTERNAL MEDICINE

## 2024-06-19 PROCEDURE — 99214 OFFICE O/P EST MOD 30 MIN: CPT | Performed by: INTERNAL MEDICINE

## 2024-06-19 PROCEDURE — 80061 LIPID PANEL: CPT

## 2024-06-19 PROCEDURE — 82043 UR ALBUMIN QUANTITATIVE: CPT

## 2024-06-19 PROCEDURE — 80053 COMPREHEN METABOLIC PANEL: CPT

## 2024-06-19 PROCEDURE — 4004F PT TOBACCO SCREEN RCVD TLK: CPT | Performed by: INTERNAL MEDICINE

## 2024-06-19 RX ORDER — AMLODIPINE BESYLATE 10 MG/1
10 TABLET ORAL EVERY MORNING
Qty: 90 TABLET | Refills: 1 | Status: SHIPPED | OUTPATIENT
Start: 2024-06-19

## 2024-06-19 RX ORDER — METFORMIN HYDROCHLORIDE 500 MG/1
500 TABLET, EXTENDED RELEASE ORAL
Qty: 90 TABLET | Refills: 1 | Status: SHIPPED | OUTPATIENT
Start: 2024-06-19

## 2024-06-19 RX ORDER — PROCHLORPERAZINE 25 MG/1
SUPPOSITORY RECTAL
Qty: 1 EACH | Refills: 0 | Status: SHIPPED | OUTPATIENT
Start: 2024-06-19

## 2024-06-19 RX ORDER — SILDENAFIL 100 MG/1
100 TABLET, FILM COATED ORAL DAILY PRN
Qty: 30 TABLET | Refills: 1 | Status: SHIPPED | OUTPATIENT
Start: 2024-06-19

## 2024-06-19 RX ORDER — ATORVASTATIN CALCIUM 20 MG/1
20 TABLET, FILM COATED ORAL EVERY MORNING
Qty: 90 TABLET | Refills: 1 | Status: SHIPPED | OUTPATIENT
Start: 2024-06-19

## 2024-06-19 RX ORDER — MELATONIN
1000 DAILY
Qty: 90 TABLET | Refills: 3 | Status: SHIPPED | OUTPATIENT
Start: 2024-06-19

## 2024-06-19 RX ORDER — HYDROCHLOROTHIAZIDE 25 MG/1
25 TABLET ORAL DAILY
Qty: 90 TABLET | Refills: 1 | Status: SHIPPED | OUTPATIENT
Start: 2024-06-19

## 2024-06-19 RX ORDER — PROCHLORPERAZINE 25 MG/1
SUPPOSITORY RECTAL
Qty: 3 EACH | Refills: 2 | Status: SHIPPED | OUTPATIENT
Start: 2024-06-19

## 2024-06-19 SDOH — ECONOMIC STABILITY: FOOD INSECURITY: WITHIN THE PAST 12 MONTHS, THE FOOD YOU BOUGHT JUST DIDN'T LAST AND YOU DIDN'T HAVE MONEY TO GET MORE.: NEVER TRUE

## 2024-06-19 SDOH — ECONOMIC STABILITY: FOOD INSECURITY: WITHIN THE PAST 12 MONTHS, YOU WORRIED THAT YOUR FOOD WOULD RUN OUT BEFORE YOU GOT MONEY TO BUY MORE.: NEVER TRUE

## 2024-06-19 SDOH — ECONOMIC STABILITY: INCOME INSECURITY: HOW HARD IS IT FOR YOU TO PAY FOR THE VERY BASICS LIKE FOOD, HOUSING, MEDICAL CARE, AND HEATING?: NOT HARD AT ALL

## 2024-06-19 ASSESSMENT — ENCOUNTER SYMPTOMS
SHORTNESS OF BREATH: 0
ABDOMINAL PAIN: 0

## 2024-06-19 ASSESSMENT — PATIENT HEALTH QUESTIONNAIRE - PHQ9
2. FEELING DOWN, DEPRESSED OR HOPELESS: NOT AT ALL
SUM OF ALL RESPONSES TO PHQ QUESTIONS 1-9: 0
SUM OF ALL RESPONSES TO PHQ QUESTIONS 1-9: 0
1. LITTLE INTEREST OR PLEASURE IN DOING THINGS: NOT AT ALL
SUM OF ALL RESPONSES TO PHQ9 QUESTIONS 1 & 2: 0
SUM OF ALL RESPONSES TO PHQ QUESTIONS 1-9: 0
SUM OF ALL RESPONSES TO PHQ QUESTIONS 1-9: 0

## 2024-06-19 NOTE — PROGRESS NOTES
\"Have you been to the ER, urgent care clinic since your last visit?  Hospitalized since your last visit?\"    NO    “Have you seen or consulted any other health care providers outside of [facility] since your last visit?”    NO            Click Here for Release of Records Request

## 2024-06-19 NOTE — PROGRESS NOTES
Subjective:      Patient ID: Robert Clements Jr. is a 63 y.o. male.    Follow up    Near syncope follow up.  Patient has not presented any near syncope's or any other issues since the last appointment.  He is following up with cardiology is taking baby aspirin every day.    His stress test was unremarkable.  He complains of the right knee and right hip, he was seeing Dr. Newman's office last year.  He would like to reestablish.  Provided contact information of Ortho.  Provide letter for work, hard to walk the stairs at work.  He is on light duty already.  He will call make appointment.  He requesting refill for sildenafil, so far no issues.  As needed ED.  Blood pressure controlled, continue current treatment.  Will check A1c for diabetes status.  Has no other complaints or concerns.          Pre diabetes  Metformin  mg daily on 9/13/2023     NAFLD   US 2021: Increased hepatic echogenicity, diffuse hepatocellular disease - most commonly seen in fatty infiltration. No discrete mass or biliary dilatation.  Hepatitis panel negative.  Aldolase negative.  HIV negative.  Order pending workup.     Elevated protein, BUN  Protein electrophoresis: SPE pattern reflects a polyclonal increase in gamma globulin. Hypergammaglobulinemia is found in a wide variety of infectious, non-infectious, and autoimmune disease states. Evidence of monoclonal protein is not apparent.      Episodic abdominal pain  H. pylori was neg, will need upper endoscopy if pepcid does not help.  No current symptoms.     Advanced right hip arthritis  Advanced patellofemoral disease   Radiographs obtained 7/25/2023   MRI obtained.  [facility].     Essential hypertension  Amlodipine, hydrochlorothiazide.     Hyperlipidemia  Atorvastatin. Started around 2018.     Allergic rhinitis  Zyrtec as needed.     History of vitamin D deficiency  Not taking supplements currently.     Family history of colon cancer  Patient's mother by the age of 74 years

## 2024-06-21 ENCOUNTER — OFFICE VISIT (OUTPATIENT)
Age: 64
End: 2024-06-21
Payer: COMMERCIAL

## 2024-06-21 DIAGNOSIS — M16.11 PRIMARY OSTEOARTHRITIS OF RIGHT HIP: Primary | ICD-10-CM

## 2024-06-21 DIAGNOSIS — M21.951 ACQUIRED DEFORMITY OF RIGHT HIP: ICD-10-CM

## 2024-06-21 DIAGNOSIS — E11.29 TYPE 2 DIABETES MELLITUS WITH OTHER DIABETIC KIDNEY COMPLICATION, WITHOUT LONG-TERM CURRENT USE OF INSULIN (HCC): ICD-10-CM

## 2024-06-21 DIAGNOSIS — I10 HYPERTENSION, UNSPECIFIED TYPE: ICD-10-CM

## 2024-06-21 DIAGNOSIS — Z01.818 PRE-OP TESTING: ICD-10-CM

## 2024-06-21 DIAGNOSIS — Z72.0 TOBACCO ABUSE: ICD-10-CM

## 2024-06-21 DIAGNOSIS — M17.11 PRIMARY OSTEOARTHRITIS OF RIGHT KNEE: ICD-10-CM

## 2024-06-21 PROCEDURE — 3044F HG A1C LEVEL LT 7.0%: CPT | Performed by: ORTHOPAEDIC SURGERY

## 2024-06-21 PROCEDURE — 99205 OFFICE O/P NEW HI 60 MIN: CPT | Performed by: ORTHOPAEDIC SURGERY

## 2024-06-21 PROCEDURE — 2022F DILAT RTA XM EVC RTNOPTHY: CPT | Performed by: ORTHOPAEDIC SURGERY

## 2024-06-21 PROCEDURE — 1123F ACP DISCUSS/DSCN MKR DOCD: CPT | Performed by: ORTHOPAEDIC SURGERY

## 2024-06-21 PROCEDURE — 4004F PT TOBACCO SCREEN RCVD TLK: CPT | Performed by: ORTHOPAEDIC SURGERY

## 2024-06-21 PROCEDURE — 3017F COLORECTAL CA SCREEN DOC REV: CPT | Performed by: ORTHOPAEDIC SURGERY

## 2024-06-21 PROCEDURE — G8420 CALC BMI NORM PARAMETERS: HCPCS | Performed by: ORTHOPAEDIC SURGERY

## 2024-06-21 PROCEDURE — G8428 CUR MEDS NOT DOCUMENT: HCPCS | Performed by: ORTHOPAEDIC SURGERY

## 2024-06-21 NOTE — PROGRESS NOTES
Patient: Robert Clements Jr.                MRN: [id number]       SSN: [id number]  YOB: 1960        AGE: 63 y.o.        SEX: male  BMI: There is no height or weight on file to calculate BMI.    PCP: Emeka Brown MD  06/21/24    Chief Complaint: Hip Pain (RT )      1. Primary osteoarthritis of right hip  -     SCHEDULE SURGERY  2. Primary osteoarthritis of right knee  3. Type 2 diabetes mellitus with other diabetic kidney complication, without long-term current use of insulin (HCC)  4. Hypertension, unspecified type  5. Tobacco abuse  6. Pre-op testing  -     Hemoglobin A1C; Future  -     APTT; Future  -     Protime-INR; Future  -     Urinalysis with Microscopic; Future  -     XR CHEST 1 VIEW; Future  -     Comprehensive Metabolic Panel; Future  -     CBC with Auto Differential; Future  -     EKG 12 Lead; Future  -     NICOTINE AND METABOLITES, URINE; Future  -     Urine Drug Screen; Future  -     Pain Mgmt Panel w/Refl,Ur; Future  7. Acquired deformity of right hip  -     CT HIP RIGHT WO CONTRAST; Future        HPI:  Robert Clements Jr. is a 63 y.o. male with chief complaint of   Chief Complaint   Patient presents with    Hip Pain     RT      -1/30/2024: Right hip intra-articular cortisone injection, no relief  -Right hip intra-articular cortisone injection 8/18/2023 with 4 days of relief  -Right knee intra-articular cortisone injection 11/28/2023 with 3 weeks of relief    Patient with right knee and right hip pain for about a year.  He saw [facility] Lo[facility]on 3 months ago and was set up for a hip injection which he had recently and reports he got 4 days of improvement where he was walking without a limp.  He was also prescribed an MRI as well as Mobic.  Patient does not feel like the Mobic helped significantly, only a small amount.  Patient has diabetes, hypertension and smokes Black and milds.    Works on his feet in the [employer] business.     IMAGING:  Imaging read by myself and interpreted as

## 2024-07-11 ENCOUNTER — HOSPITAL ENCOUNTER (OUTPATIENT)
Facility: HOSPITAL | Age: 64
Discharge: HOME | End: 2024-07-11
Attending: ORTHOPAEDIC SURGERY
Payer: COMMERCIAL

## 2024-07-11 DIAGNOSIS — M21.951 ACQUIRED DEFORMITY OF RIGHT HIP: ICD-10-CM

## 2024-07-11 PROCEDURE — 73700 CT LOWER EXTREMITY W/O DYE: CPT

## 2024-09-04 ENCOUNTER — HOSPITAL ENCOUNTER (OUTPATIENT)
Facility: HOSPITAL | Age: 64
Discharge: HOME | End: 2024-09-07

## 2024-09-04 LAB — LABCORP SPECIMEN COLLECTION: NORMAL

## 2024-09-04 PROCEDURE — 99001 SPECIMEN HANDLING PT-LAB: CPT

## 2024-09-06 ENCOUNTER — PREP FOR PROCEDURE (OUTPATIENT)
Age: 64
End: 2024-09-06

## 2024-09-06 ENCOUNTER — HOSPITAL ENCOUNTER (OUTPATIENT)
Facility: HOSPITAL | Age: 64
Setting detail: OUTPATIENT SURGERY
End: 2024-09-06
Attending: ORTHOPAEDIC SURGERY | Admitting: ORTHOPAEDIC SURGERY
Payer: MEDICARE

## 2024-09-09 ENCOUNTER — HOSPITAL ENCOUNTER (OUTPATIENT)
Facility: HOSPITAL | Age: 64
Setting detail: SPECIMEN
Discharge: HOME | End: 2024-09-12
Payer: MEDICARE

## 2024-09-09 ENCOUNTER — OFFICE VISIT (OUTPATIENT)
Facility: CLINIC | Age: 64
End: 2024-09-09
Payer: MEDICARE

## 2024-09-09 VITALS
WEIGHT: 206 LBS | HEART RATE: 93 BPM | SYSTOLIC BLOOD PRESSURE: 136 MMHG | RESPIRATION RATE: 17 BRPM | HEIGHT: 76 IN | BODY MASS INDEX: 25.09 KG/M2 | TEMPERATURE: 97.3 F | DIASTOLIC BLOOD PRESSURE: 70 MMHG | OXYGEN SATURATION: 99 %

## 2024-09-09 DIAGNOSIS — Z01.818 PRE-OP TESTING: ICD-10-CM

## 2024-09-09 DIAGNOSIS — M19.09 PRIMARY OSTEOARTHRITIS, OTHER SPECIFIED SITE: ICD-10-CM

## 2024-09-09 DIAGNOSIS — Z01.818 PRE-OP EVALUATION: ICD-10-CM

## 2024-09-09 DIAGNOSIS — Z71.89 ACP (ADVANCE CARE PLANNING): ICD-10-CM

## 2024-09-09 DIAGNOSIS — Z00.00 INITIAL MEDICARE ANNUAL WELLNESS VISIT: Primary | ICD-10-CM

## 2024-09-09 LAB
ALBUMIN SERPL-MCNC: 4.6 G/DL (ref 3.4–5)
ALBUMIN/GLOB SERPL: 1.1 (ref 0.8–1.7)
ALP SERPL-CCNC: 78 U/L (ref 45–117)
ALT SERPL-CCNC: 42 U/L (ref 16–61)
AMPHET UR QL SCN: NEGATIVE
ANION GAP SERPL CALC-SCNC: 9 MMOL/L (ref 3–18)
APPEARANCE UR: CLEAR
APTT PPP: 26.5 SEC (ref 23–36.4)
AST SERPL-CCNC: 50 U/L (ref 10–38)
BARBITURATES UR QL SCN: NEGATIVE
BASOPHILS # BLD: 0 K/UL (ref 0–0.1)
BASOPHILS NFR BLD: 1 % (ref 0–2)
BENZODIAZ UR QL: NEGATIVE
BILIRUB SERPL-MCNC: 0.6 MG/DL (ref 0.2–1)
BILIRUB UR QL: NEGATIVE
BUN SERPL-MCNC: 16 MG/DL (ref 7–18)
BUN/CREAT SERPL: 19 (ref 12–20)
CALCIUM SERPL-MCNC: 9.9 MG/DL (ref 8.5–10.1)
CANNABINOIDS UR QL SCN: NEGATIVE
CHLORIDE SERPL-SCNC: 100 MMOL/L (ref 100–111)
CO2 SERPL-SCNC: 27 MMOL/L (ref 21–32)
COCAINE UR QL SCN: NEGATIVE
COLOR UR: YELLOW
CREAT SERPL-MCNC: 0.83 MG/DL (ref 0.6–1.3)
DIFFERENTIAL METHOD BLD: ABNORMAL
EOSINOPHIL # BLD: 0 K/UL (ref 0–0.4)
EOSINOPHIL NFR BLD: 1 % (ref 0–5)
EPITH CASTS URNS QL MICRO: ABNORMAL /LPF (ref 0–5)
ERYTHROCYTE [DISTWIDTH] IN BLOOD BY AUTOMATED COUNT: 15.8 % (ref 11.6–14.5)
EST. AVERAGE GLUCOSE BLD GHB EST-MCNC: 117 MG/DL
GLOBULIN SER CALC-MCNC: 4.3 G/DL (ref 2–4)
GLUCOSE SERPL-MCNC: 74 MG/DL (ref 74–99)
GLUCOSE UR STRIP.AUTO-MCNC: NEGATIVE MG/DL
HBA1C MFR BLD: 5.7 % (ref 4.2–5.6)
HCT VFR BLD AUTO: 39.8 % (ref 36–48)
HGB BLD-MCNC: 12.9 G/DL (ref 13–16)
HGB UR QL STRIP: NEGATIVE
HYALINE CASTS URNS QL MICRO: ABNORMAL /LPF (ref 0–2)
IMM GRANULOCYTES # BLD AUTO: 0 K/UL (ref 0–0.04)
IMM GRANULOCYTES NFR BLD AUTO: 0 % (ref 0–0.5)
INR PPP: 1 (ref 0.9–1.1)
KETONES UR QL STRIP.AUTO: NEGATIVE MG/DL
LEUKOCYTE ESTERASE UR QL STRIP.AUTO: NEGATIVE
LYMPHOCYTES # BLD: 1.2 K/UL (ref 0.9–3.6)
LYMPHOCYTES NFR BLD: 21 % (ref 21–52)
Lab: NORMAL
MCH RBC QN AUTO: 29.5 PG (ref 24–34)
MCHC RBC AUTO-ENTMCNC: 32.4 G/DL (ref 31–37)
MCV RBC AUTO: 91.1 FL (ref 78–100)
METHADONE UR QL: NEGATIVE
MONOCYTES # BLD: 0.4 K/UL (ref 0.05–1.2)
MONOCYTES NFR BLD: 8 % (ref 3–10)
MUCOUS THREADS URNS QL MICRO: ABNORMAL /LPF
NEUTS SEG # BLD: 3.8 K/UL (ref 1.8–8)
NEUTS SEG NFR BLD: 70 % (ref 40–73)
NITRITE UR QL STRIP.AUTO: NEGATIVE
NRBC # BLD: 0 K/UL (ref 0–0.01)
NRBC BLD-RTO: 0 PER 100 WBC
OPIATES UR QL: NEGATIVE
PCP UR QL: NEGATIVE
PH UR STRIP: 6 (ref 5–8)
PLATELET # BLD AUTO: 244 K/UL (ref 135–420)
PMV BLD AUTO: 9.5 FL (ref 9.2–11.8)
POTASSIUM SERPL-SCNC: 3.7 MMOL/L (ref 3.5–5.5)
PROT SERPL-MCNC: 8.9 G/DL (ref 6.4–8.2)
PROT UR STRIP-MCNC: ABNORMAL MG/DL
PROTHROMBIN TIME: 13 SEC (ref 11.9–14.9)
RBC # BLD AUTO: 4.37 M/UL (ref 4.35–5.65)
RBC #/AREA URNS HPF: ABNORMAL /HPF (ref 0–5)
SODIUM SERPL-SCNC: 136 MMOL/L (ref 136–145)
SP GR UR REFRACTOMETRY: 1.01 (ref 1–1.03)
UROBILINOGEN UR QL STRIP.AUTO: 1 EU/DL (ref 0.2–1)
WBC # BLD AUTO: 5.4 K/UL (ref 4.6–13.2)
WBC URNS QL MICRO: ABNORMAL /HPF (ref 0–4)

## 2024-09-09 PROCEDURE — 85730 THROMBOPLASTIN TIME PARTIAL: CPT

## 2024-09-09 PROCEDURE — 1123F ACP DISCUSS/DSCN MKR DOCD: CPT | Performed by: INTERNAL MEDICINE

## 2024-09-09 PROCEDURE — 85610 PROTHROMBIN TIME: CPT

## 2024-09-09 PROCEDURE — G0438 PPPS, INITIAL VISIT: HCPCS | Performed by: INTERNAL MEDICINE

## 2024-09-09 PROCEDURE — 3075F SYST BP GE 130 - 139MM HG: CPT | Performed by: INTERNAL MEDICINE

## 2024-09-09 PROCEDURE — 3078F DIAST BP <80 MM HG: CPT | Performed by: INTERNAL MEDICINE

## 2024-09-09 PROCEDURE — G8427 DOCREV CUR MEDS BY ELIG CLIN: HCPCS | Performed by: INTERNAL MEDICINE

## 2024-09-09 PROCEDURE — 80053 COMPREHEN METABOLIC PANEL: CPT

## 2024-09-09 PROCEDURE — 81001 URINALYSIS AUTO W/SCOPE: CPT

## 2024-09-09 PROCEDURE — 99213 OFFICE O/P EST LOW 20 MIN: CPT | Performed by: INTERNAL MEDICINE

## 2024-09-09 PROCEDURE — 99497 ADVNCD CARE PLAN 30 MIN: CPT | Performed by: INTERNAL MEDICINE

## 2024-09-09 PROCEDURE — 85025 COMPLETE CBC W/AUTO DIFF WBC: CPT

## 2024-09-09 PROCEDURE — G8419 CALC BMI OUT NRM PARAM NOF/U: HCPCS | Performed by: INTERNAL MEDICINE

## 2024-09-09 PROCEDURE — 3017F COLORECTAL CA SCREEN DOC REV: CPT | Performed by: INTERNAL MEDICINE

## 2024-09-09 PROCEDURE — 80307 DRUG TEST PRSMV CHEM ANLYZR: CPT

## 2024-09-09 PROCEDURE — 4004F PT TOBACCO SCREEN RCVD TLK: CPT | Performed by: INTERNAL MEDICINE

## 2024-09-09 PROCEDURE — 83036 HEMOGLOBIN GLYCOSYLATED A1C: CPT

## 2024-09-09 PROCEDURE — 36415 COLL VENOUS BLD VENIPUNCTURE: CPT

## 2024-09-09 ASSESSMENT — PATIENT HEALTH QUESTIONNAIRE - PHQ9
SUM OF ALL RESPONSES TO PHQ QUESTIONS 1-9: 0
2. FEELING DOWN, DEPRESSED OR HOPELESS: NOT AT ALL
SUM OF ALL RESPONSES TO PHQ QUESTIONS 1-9: 0
1. LITTLE INTEREST OR PLEASURE IN DOING THINGS: NOT AT ALL
SUM OF ALL RESPONSES TO PHQ QUESTIONS 1-9: 0
SUM OF ALL RESPONSES TO PHQ9 QUESTIONS 1 & 2: 0
SUM OF ALL RESPONSES TO PHQ QUESTIONS 1-9: 0

## 2024-09-09 ASSESSMENT — ENCOUNTER SYMPTOMS
ABDOMINAL PAIN: 0
SHORTNESS OF BREATH: 0

## 2024-09-09 ASSESSMENT — LIFESTYLE VARIABLES: HOW OFTEN DO YOU HAVE A DRINK CONTAINING ALCOHOL: 2-3 TIMES A WEEK

## 2024-09-15 DIAGNOSIS — D64.9 ANEMIA, UNSPECIFIED TYPE: Primary | ICD-10-CM

## 2024-09-15 RX ORDER — FERROUS SULFATE 325(65) MG
325 TABLET ORAL
Qty: 50 TABLET | Refills: 0 | Status: SHIPPED | OUTPATIENT
Start: 2024-09-16

## 2024-10-01 ENCOUNTER — OFFICE VISIT (OUTPATIENT)
Age: 64
End: 2024-10-01
Payer: MEDICARE

## 2024-10-01 VITALS — WEIGHT: 203 LBS | BODY MASS INDEX: 24.71 KG/M2

## 2024-10-01 DIAGNOSIS — M16.11 PRIMARY OSTEOARTHRITIS OF RIGHT HIP: Primary | ICD-10-CM

## 2024-10-01 PROCEDURE — G8484 FLU IMMUNIZE NO ADMIN: HCPCS | Performed by: ORTHOPAEDIC SURGERY

## 2024-10-01 PROCEDURE — 4004F PT TOBACCO SCREEN RCVD TLK: CPT | Performed by: ORTHOPAEDIC SURGERY

## 2024-10-01 PROCEDURE — G8420 CALC BMI NORM PARAMETERS: HCPCS | Performed by: ORTHOPAEDIC SURGERY

## 2024-10-01 PROCEDURE — 99213 OFFICE O/P EST LOW 20 MIN: CPT | Performed by: ORTHOPAEDIC SURGERY

## 2024-10-01 PROCEDURE — 1123F ACP DISCUSS/DSCN MKR DOCD: CPT | Performed by: ORTHOPAEDIC SURGERY

## 2024-10-01 PROCEDURE — 3017F COLORECTAL CA SCREEN DOC REV: CPT | Performed by: ORTHOPAEDIC SURGERY

## 2024-10-01 PROCEDURE — G8428 CUR MEDS NOT DOCUMENT: HCPCS | Performed by: ORTHOPAEDIC SURGERY

## 2024-10-01 RX ORDER — MELOXICAM 15 MG/1
15 TABLET ORAL DAILY
Qty: 30 TABLET | Refills: 1 | Status: SHIPPED | OUTPATIENT
Start: 2024-10-01

## 2024-10-01 RX ORDER — ACETAMINOPHEN 500 MG
1000 TABLET ORAL EVERY 8 HOURS PRN
Qty: 180 TABLET | Refills: 0 | Status: SHIPPED | OUTPATIENT
Start: 2024-10-01 | End: 2024-10-31

## 2024-10-01 NOTE — PROGRESS NOTES
8:40 AM   AMB PAIN ASSESSMENT   Location of Pain Hip   Location Modifiers Right   Severity of Pain 9   Duration of Pain Persistent   Frequency of Pain Constant     Tobacco Use: High Risk (10/1/2024)    Patient History     Smoking Tobacco Use: Some Days     Smokeless Tobacco Use: Never     Passive Exposure: Not on file         Past Medical History:   Diagnosis Date    Arthritis     Diabetes (HCC)     HLD (hyperlipidemia)     Hypertension        Family History   Problem Relation Age of Onset    Cancer Father     Alzheimer's Disease Father     Cancer Mother         colon ca       Current Outpatient Medications   Medication Sig Dispense Refill    meloxicam (MOBIC) 15 MG tablet Take 1 tablet by mouth daily 30 tablet 1    acetaminophen (TYLENOL) 500 MG tablet Take 2 tablets by mouth every 8 hours as needed for Pain 180 tablet 0    ferrous sulfate (IRON 325) 325 (65 Fe) MG tablet Take 1 tablet by mouth three times a week 50 tablet 0    sildenafil (VIAGRA) 100 MG tablet Take 1 tablet by mouth daily as needed for Erectile Dysfunction 30 tablet 1    vitamin D3 (CHOLECALCIFEROL) 25 MCG (1000 UT) TABS tablet Take 1 tablet by mouth daily 90 tablet 3    amLODIPine (NORVASC) 10 MG tablet Take 1 tablet by mouth every morning 90 tablet 1    atorvastatin (LIPITOR) 20 MG tablet Take 1 tablet by mouth every morning 90 tablet 1    hydroCHLOROthiazide (HYDRODIURIL) 25 MG tablet Take 1 tablet by mouth daily 90 tablet 1    metFORMIN (GLUCOPHAGE-XR) 500 MG extended release tablet Take 1 tablet by mouth daily (with breakfast) 90 tablet 1    Continuous Glucose Sensor (DEXCOM G6 SENSOR) MISC Apply one every 10 days. To check sugars continuously.  Diagnosis diabetes mellitus type 2. 3 each 2    Continuous Glucose  (DEXCOM G6 ) DANIA To check sugars continuously.  Diagnosis diabetes mellitus type 2. 1 each 0    cyclobenzaprine (FLEXERIL) 10 MG tablet 1 tablet      cetirizine (ZYRTEC) 10 MG tablet Take 1 tablet by mouth daily as

## 2024-10-24 DIAGNOSIS — M16.11 PRIMARY OSTEOARTHRITIS OF RIGHT HIP: ICD-10-CM

## 2024-10-25 RX ORDER — PSEUDOEPHED/ACETAMINOPH/DIPHEN 30MG-500MG
TABLET ORAL
Qty: 180 TABLET | Refills: 0 | Status: SHIPPED | OUTPATIENT
Start: 2024-10-25

## 2024-10-25 RX ORDER — MELOXICAM 15 MG/1
15 TABLET ORAL DAILY
Qty: 90 TABLET | Refills: 1 | Status: SHIPPED | OUTPATIENT
Start: 2024-10-25

## 2024-10-30 ENCOUNTER — HOSPITAL ENCOUNTER (OUTPATIENT)
Facility: HOSPITAL | Age: 64
Discharge: HOME | End: 2024-11-02
Payer: MEDICARE

## 2024-10-30 DIAGNOSIS — Z01.818 PRE-OP TESTING: ICD-10-CM

## 2024-10-30 PROCEDURE — 71045 X-RAY EXAM CHEST 1 VIEW: CPT

## 2024-10-30 RX ORDER — ASPIRIN 81 MG/1
81 TABLET ORAL DAILY
COMMUNITY
End: 2024-11-19 | Stop reason: SDUPTHER

## 2024-10-30 ASSESSMENT — HOOS JR
RISING FROM SITTING: EXTREME
WALKING ON UNEVEN SURFACE: EXTREME
HOOS JR RAW SCORE: 22
HOOS JR RAW SCORE: 22
GOING UP OR DOWN STAIRS: EXTREME
BENDING TO THE FLOOR TO PICK UP OBJECT: EXTREME
SITTING: MODERATE
HOOS JR TOTAL INTERVAL SCORE: 15.633
LYING IN BED (TURNING OVER, MAINTAINING HIP POSITION): EXTREME

## 2024-10-30 ASSESSMENT — PROMIS GLOBAL HEALTH SCALE
WHO IS THE PERSON COMPLETING THE PROMIS V1.1 SURVEY?: SELF
IN GENERAL, HOW WOULD YOU RATE YOUR SATISFACTION WITH YOUR SOCIAL ACTIVITIES AND RELATIONSHIPS [ON A SCALE OF 1 (POOR) TO 5 (EXCELLENT)]?: VERY GOOD
IN GENERAL, WOULD YOU SAY YOUR QUALITY OF LIFE IS...[ON A SCALE OF 1 (POOR) TO 5 (EXCELLENT)]: EXCELLENT
IN GENERAL, HOW WOULD YOU RATE YOUR MENTAL HEALTH, INCLUDING YOUR MOOD AND YOUR ABILITY TO THINK [ON A SCALE OF 1 (POOR) TO 5 (EXCELLENT)]?: VERY GOOD
TO WHAT EXTENT ARE YOU ABLE TO CARRY OUT YOUR EVERYDAY PHYSICAL ACTIVITIES SUCH AS WALKING, CLIMBING STAIRS, CARRYING GROCERIES, OR MOVING A CHAIR [ON A SCALE OF 1 (NOT AT ALL) TO 5 (COMPLETELY)]?: MOSTLY
HOW IS THE PROMIS V1.1 BEING ADMINISTERED?: TELEPHONE
IN GENERAL, WOULD YOU SAY YOUR HEALTH IS...[ON A SCALE OF 1 (POOR) TO 5 (EXCELLENT)]: VERY GOOD
IN GENERAL, HOW WOULD YOU RATE YOUR PHYSICAL HEALTH [ON A SCALE OF 1 (POOR) TO 5 (EXCELLENT)]?: VERY GOOD
SUM OF RESPONSES TO QUESTIONS 2, 4, 5, & 10: 18
IN GENERAL, PLEASE RATE HOW WELL YOU CARRY OUT YOUR USUAL SOCIAL ACTIVITIES (INCLUDES ACTIVITIES AT HOME, AT WORK, AND IN YOUR COMMUNITY, AND RESPONSIBILITIES AS A PARENT, CHILD, SPOUSE, EMPLOYEE, FRIEND, ETC) [ON A SCALE OF 1 (POOR) TO 5 (EXCELLENT)]?: VERY GOOD
IN THE PAST 7 DAYS, HOW WOULD YOU RATE YOUR PAIN ON AVERAGE [ON A SCALE FROM 0 (NO PAIN) TO 10 (WORST IMAGINABLE PAIN)]?: 10 WORST IMAGINABLE PAIN
SUM OF RESPONSES TO QUESTIONS 3, 6, 7, & 8: 22
IN THE PAST 7 DAYS, HOW WOULD YOU RATE YOUR FATIGUE ON AVERAGE [ON A SCALE FROM 1 (NONE) TO 5 (VERY SEVERE)]?: MILD
IN THE PAST 7 DAYS, HOW OFTEN HAVE YOU BEEN BOTHERED BY EMOTIONAL PROBLEMS, SUCH AS FEELING ANXIOUS, DEPRESSED, OR IRRITABLE [ON A SCALE FROM 1 (NEVER) TO 5 (ALWAYS)]?: NEVER

## 2024-10-30 NOTE — PERIOP NOTE
Instructions for your surgery at [facility]      Today's Date:  10/30/2024      Patient's Name:  Robert Clements Jr.           Surgery Date:  11/21              Please enter the main entrance of the hospital and check-in at the front security desk located in the lobby. They will direct you to the area to report for your surgery.     Do NOT eat or drink anything, including candy, gum, or ice chips after midnight prior to your surgery, unless you have specific instructions from your surgeon or anesthesia provider to do so.  Brush your teeth before coming to the hospital. You may swish with water, but do not swallow.  No smoking/Vaping/E-Cigarettes 24 hours prior to the day of surgery.  No alcohol 24 hours prior to the day of surgery.  No recreational drugs for one week prior to the day of surgery.  Bring Photo ID, Insurance information, and Co-pay if required on day of surgery.  Bring in pertinent legal documents, such as, Medical Power of , DNR, Advance Directive, etc.  Leave all valuables, including money/purse, at home.  Remove all jewelry, including ALL body piercings, nail polish, acrylic nails, and makeup (including mascara); no lotions, powders, deodorant, or perfume/cologne/after shave on the skin.  Follow instruction for Hibiclens washes and CHG wipes from surgeon's office.   Glasses and dentures may be worn to the hospital. They must be removed prior to surgery. Please bring case/container for glasses or dentures.   Contact lenses should not be worn on day of surgery.   Call your doctor's office if symptoms of a cold or illness develop within 24-48 hours prior to your surgery.  Call your doctor's office if you have any questions concerning insurance or co-pays.  15. AN ADULT (relative or friend 18 years or older) MUST DRIVE YOU HOME AFTER YOUR SURGERY.  16. Please make arrangements for a responsible adult (18 years or older) to be with you for 24 hours after your surgery.   17.

## 2024-11-12 DIAGNOSIS — Z72.0 TOBACCO ABUSE: ICD-10-CM

## 2024-11-12 DIAGNOSIS — I10 ESSENTIAL HYPERTENSION: ICD-10-CM

## 2024-11-12 DIAGNOSIS — Z01.818 PRE-OP TESTING: Primary | ICD-10-CM

## 2024-11-13 NOTE — TELEPHONE ENCOUNTER
Last Appointment:  9/9/2024  Future Appointments   Date Time Provider Department Center   11/15/2024  3:30 PM Ramona Jean PA-C [facility] AMB   12/6/2024  1:00 PM Ramona Jean PA-C [facility] AMB   12/9/2024  8:00 AM Emeka Brown MD [facility] DEP   12/19/2024  8:30 AM Emeka Brown MD [facility] DEP   1/3/2025  2:00 PM Sunny Arthur DO [facility] AMB

## 2024-11-15 ENCOUNTER — OFFICE VISIT (OUTPATIENT)
Age: 64
End: 2024-11-15
Payer: MEDICARE

## 2024-11-15 VITALS
HEIGHT: 76 IN | WEIGHT: 203.2 LBS | BODY MASS INDEX: 24.74 KG/M2 | SYSTOLIC BLOOD PRESSURE: 107 MMHG | DIASTOLIC BLOOD PRESSURE: 72 MMHG

## 2024-11-15 DIAGNOSIS — E11.29 TYPE 2 DIABETES MELLITUS WITH OTHER DIABETIC KIDNEY COMPLICATION, WITHOUT LONG-TERM CURRENT USE OF INSULIN (HCC): ICD-10-CM

## 2024-11-15 DIAGNOSIS — M16.11 PRIMARY OSTEOARTHRITIS OF RIGHT HIP: Primary | ICD-10-CM

## 2024-11-15 DIAGNOSIS — Z72.0 TOBACCO ABUSE: ICD-10-CM

## 2024-11-15 DIAGNOSIS — I10 HYPERTENSION, UNSPECIFIED TYPE: ICD-10-CM

## 2024-11-15 DIAGNOSIS — Z01.818 PRE-OP TESTING: ICD-10-CM

## 2024-11-15 PROCEDURE — 99024 POSTOP FOLLOW-UP VISIT: CPT

## 2024-11-15 PROCEDURE — 72100 X-RAY EXAM L-S SPINE 2/3 VWS: CPT

## 2024-11-15 PROCEDURE — 73502 X-RAY EXAM HIP UNI 2-3 VIEWS: CPT

## 2024-11-15 NOTE — DISCHARGE INSTRUCTIONS
0700 0800 0900 1:00 PM 2:00PM 3:00PM 7:00PM 8:00PM 9:00PM      1 TABLET PANTAPROZOLE 1 TABLET tramadol IF NEEDED FOR PAIN  1 TABLET 5MG OXYCODONE IF PAIN NOT CONTROLLED BY tramadol 2 TABLETS 500 MG TYLENOL  (ACETAMINOPHEN) 1 TABLET OF tramadol AS NEEDED FOR PAIN MAY TAKE 1 TABLET 5 MG OXYCODONE FOR PAIN NOT RELIEVED BY TRAMADOL TAKE 2 500 MG TABLETS OF TYLENOL  (ACETAMINOPHEN) 1 TABLET 81 MG ASPIRIN MAY TAKE 1 TABLET 5 MG OXYCODONE FOR PAIN NOT RELIEVED BY TRAMADOL      1 TABLET 81 MG ASPIRIN        1 CASPULE OF CELEBREX OR 1 TABLET OF MELOXICAM TAKE 1 CAPSULE docusate sodium        2 TABLETS 500 MG Tylenol  (ACETAMINOPHEN)         MAY TAKE 1 TABLET OF tramadol AS NEEDED FOR PAIN        1 CAPSULE docusate sodium                                                           MEDICATION SCHEDULE         DISCHARGE ACTIVITY    TO BE Performed EVERY HOUR while awake    Quad sets - 5 reps, contract thigh muscle hard for 5 seconds, alternate with heel slides  Heel slides - 5 reps, hold 5 seconds at 90 degrees, alternate with Quad sets  Walk - 5-20 Steps   Ice - 20 minutes       OUTPATIENT MEDICATIONS    SCHEDULED:    -For Pain:    Tylenol (Acetaminophen) 500 mg: Take 2 tabs by mouth every 8 hours for pain.     Mobic (Meloxicam) 15 mg: Take 1 cap by mouth once daily with food in the evening.       -For Stomach Acid control:    Pantoprazole (Protonix) 20 mg: Take 1 tablet daily while taking aspirin to prevent stomach ulcers.     -To Prevent Constipation:    Colace (Docusate sodium) 100 mg: Take 1 cap by mouth twice daily while taking narcotics to avoid constipation.    Miralax (Polyethylene glycol): Mix 17 Grams with 8 oz. juice or water and take by mouth up to twice daily as needed     -To Prevent Blood Clots    Aspirin EC 81 mg: Take 1 tablet by mouth twice daily for 6 weeks       ONLY AS NEEDED:    -For Nausea:    Zofran (Ondansetron) 8mg:  take 1 tablet by mouth every 8 hours as needed for nausea    -Rescue pain

## 2024-11-15 NOTE — H&P (VIEW-ONLY)
Patient: Robert Clements Jr.                MRN: [id number]       SSN: [id number]  YOB: 1960        AGE: 64 y.o.        SEX: male  BMI: Body mass index is 24.73 kg/m².    PCP: Emeka Brown MD  11/18/24    Chief Complaint: H&P (H&P RT FAIZA 11/21/24)      1. Primary osteoarthritis of right hip  Assessment & Plan:  Planned Surgery Right FAIZA, direct anterior approach, with BILLIE   Surgery date 11/21/24   Clearance obtained Yes , PCP   Needs to have a negative nicotine test prior to surgery. Reports he will go Saturday to [facility] to have this done. Lab order was given     Repeat CBC and BMP will be done in pre-op.    PMH Diabetes  Hypertension  Smoker  Poor dentition    Allergies  No Known Allergies    Labs Lab Results   Component Value Date    WBC 5.4 09/09/2024    HGB 12.9 (L) 09/09/2024    HCT 39.8 09/09/2024    MCV 91.1 09/09/2024     09/09/2024    LYMPHOPCT 21 09/09/2024    RBC 4.37 09/09/2024    MCH 29.5 09/09/2024    MCHC 32.4 09/09/2024    RDW 15.8 (H) 09/09/2024     Hemoglobin A1C   Date Value Ref Range Status   09/09/2024 5.7 (H) 4.2 - 5.6 % Final     Comment:     (NOTE)  HbA1C Interpretive Ranges  <5.7              Normal  5.7 - 6.4         Consider Prediabetes  >6.5              Consider Diabetes     ,  Lab Results   Component Value Date     09/09/2024    K 3.7 09/09/2024     09/09/2024    CO2 27 09/09/2024    BUN 16 09/09/2024    CREATININE 0.83 09/09/2024    GLUCOSE 74 09/09/2024    CALCIUM 9.9 09/09/2024    BILITOT 0.6 09/09/2024    ALKPHOS 78 09/09/2024    AST 50 (H) 09/09/2024    ALT 42 09/09/2024    LABGLOM >90 09/09/2024    GFRAA 41 (L) 07/27/2022    AGRATIO 1.2 11/14/2023    GLOB 4.3 (H) 09/09/2024      Lab Results   Component Value Date/Time    VITD25 16.6 06/02/2023 08:33 AM      Lab Results   Component Value Date    INR 1.0 09/09/2024    PROTIME 13.0 09/09/2024     Lab Results   Component Value Date    APTT 26.5 09/09/2024      Pharm [facility] pharmacy  1    hydroCHLOROthiazide (HYDRODIURIL) 25 MG tablet Take 1 tablet by mouth daily 90 tablet 1    metFORMIN (GLUCOPHAGE-XR) 500 MG extended release tablet Take 1 tablet by mouth daily (with breakfast) 90 tablet 1    Continuous Glucose Sensor (DEXCOM G6 SENSOR) MISC Apply one every 10 days. To check sugars continuously.  Diagnosis diabetes mellitus type 2. 3 each 2    Continuous Glucose  (DEXCOM G6 ) DANIA To check sugars continuously.  Diagnosis diabetes mellitus type 2. 1 each 0    cyclobenzaprine (FLEXERIL) 10 MG tablet 1 tablet      cetirizine (ZYRTEC) 10 MG tablet Take 1 tablet by mouth daily as needed for Allergies or Rhinitis 30 tablet 0    glucose monitoring kit 1 kit by Does not apply route daily 1 kit 0     No current facility-administered medications for this visit.        No Known Allergies    Past Surgical History:   Procedure Laterality Date    IR CHOLECYSTOSTOMY PERCUTANEOUS COMPLETE      OTHER SURGICAL HISTORY      I&D hemorrhoids x 2       Social History     Socioeconomic History    Marital status: Single     Spouse name: Not on file    Number of children: Not on file    Years of education: Not on file    Highest education level: Not on file   Occupational History    Not on file   Tobacco Use    Smoking status: Some Days     Types: Cigars    Smokeless tobacco: Never   Vaping Use    Vaping status: Never Used   Substance and Sexual Activity    Alcohol use: Yes     Alcohol/week: 3.3 standard drinks of alcohol    Drug use: No    Sexual activity: Yes     Partners: Female     Birth control/protection: Condom   Other Topics Concern    Not on file   Social History Narrative    Not on file     Social Determinants of Health     Financial Resource Strain: Low Risk  (6/19/2024)    Overall Financial Resource Strain (CARDIA)     Difficulty of Paying Living Expenses: Not hard at all   Food Insecurity: No Food Insecurity (6/19/2024)    Hunger Vital Sign     Worried About Running Out of Food in the Last

## 2024-11-15 NOTE — PROGRESS NOTES
Patient: Robert Clements Jr.                MRN: [id number]       SSN: [id number]  YOB: 1960        AGE: 64 y.o.        SEX: male  BMI: Body mass index is 24.73 kg/m².    PCP: Emeka Brown MD  11/18/24    Chief Complaint: H&P (H&P RT FAIZA 11/21/24)      1. Primary osteoarthritis of right hip  Assessment & Plan:  Planned Surgery Right FAIZA, direct anterior approach, with BILLIE   Surgery date 11/21/24   Clearance obtained Yes , PCP   Needs to have a negative nicotine test prior to surgery. Reports he will go Saturday to  to have this done. Lab order was given     Repeat CBC and BMP will be done in pre-op.    PMH Diabetes  Hypertension  Smoker  Poor dentition    Allergies  No Known Allergies    Labs Lab Results   Component Value Date    WBC 5.4 09/09/2024    HGB 12.9 (L) 09/09/2024    HCT 39.8 09/09/2024    MCV 91.1 09/09/2024     09/09/2024    LYMPHOPCT 21 09/09/2024    RBC 4.37 09/09/2024    MCH 29.5 09/09/2024    MCHC 32.4 09/09/2024    RDW 15.8 (H) 09/09/2024     Hemoglobin A1C   Date Value Ref Range Status   09/09/2024 5.7 (H) 4.2 - 5.6 % Final     Comment:     (NOTE)  HbA1C Interpretive Ranges  <5.7              Normal  5.7 - 6.4         Consider Prediabetes  >6.5              Consider Diabetes     ,  Lab Results   Component Value Date     09/09/2024    K 3.7 09/09/2024     09/09/2024    CO2 27 09/09/2024    BUN 16 09/09/2024    CREATININE 0.83 09/09/2024    GLUCOSE 74 09/09/2024    CALCIUM 9.9 09/09/2024    BILITOT 0.6 09/09/2024    ALKPHOS 78 09/09/2024    AST 50 (H) 09/09/2024    ALT 42 09/09/2024    LABGLOM >90 09/09/2024    GFRAA 41 (L) 07/27/2022    AGRATIO 1.2 11/14/2023    GLOB 4.3 (H) 09/09/2024      Lab Results   Component Value Date/Time    VITD25 16.6 06/02/2023 08:33 AM      Lab Results   Component Value Date    INR 1.0 09/09/2024    PROTIME 13.0 09/09/2024     Lab Results   Component Value Date    APTT 26.5 09/09/2024      Pharm [facility] pharmacy

## 2024-11-17 RX ORDER — AMLODIPINE BESYLATE 10 MG/1
10 TABLET ORAL EVERY MORNING
Qty: 30 TABLET | Refills: 1 | Status: SHIPPED | OUTPATIENT
Start: 2024-11-17

## 2024-11-18 NOTE — ASSESSMENT & PLAN NOTE
Planned Surgery Right FAIZA, direct anterior approach, with BILLIE   Surgery date 11/21/24   Clearance obtained Yes , PCP   Needs to have a negative nicotine test prior to surgery. Reports he will go Saturday to [place] to have this done. Lab order was given     Repeat CBC and BMP will be done in pre-op.    PMH Diabetes  Hypertension  Smoker  Poor dentition    Allergies  No Known Allergies    Labs Lab Results   Component Value Date    WBC 5.4 09/09/2024    HGB 12.9 (L) 09/09/2024    HCT 39.8 09/09/2024    MCV 91.1 09/09/2024     09/09/2024    LYMPHOPCT 21 09/09/2024    RBC 4.37 09/09/2024    MCH 29.5 09/09/2024    MCHC 32.4 09/09/2024    RDW 15.8 (H) 09/09/2024     Hemoglobin A1C   Date Value Ref Range Status   09/09/2024 5.7 (H) 4.2 - 5.6 % Final     Comment:     (NOTE)  HbA1C Interpretive Ranges  <5.7              Normal  5.7 - 6.4         Consider Prediabetes  >6.5              Consider Diabetes     ,  Lab Results   Component Value Date     09/09/2024    K 3.7 09/09/2024     09/09/2024    CO2 27 09/09/2024    BUN 16 09/09/2024    CREATININE 0.83 09/09/2024    GLUCOSE 74 09/09/2024    CALCIUM 9.9 09/09/2024    BILITOT 0.6 09/09/2024    ALKPHOS 78 09/09/2024    AST 50 (H) 09/09/2024    ALT 42 09/09/2024    LABGLOM >90 09/09/2024    GFRAA 41 (L) 07/27/2022    AGRATIO 1.2 11/14/2023    GLOB 4.3 (H) 09/09/2024      Lab Results   Component Value Date/Time    VITD25 16.6 06/02/2023 08:33 AM      Lab Results   Component Value Date    INR 1.0 09/09/2024    PROTIME 13.0 09/09/2024     Lab Results   Component Value Date    APTT 26.5 09/09/2024      Pharm [facility] pharmacy day of surgery   Sent? no   Anesthesia  Anesthesia was also discussed with them today including spinal anesthesia vs general anesthesia. The risks and benefits were explained for both including the risks of nausea, vomiting, confusion, sore throat, back pain (with spinal anesthesia) and allergic reaction. The risk of cardiac and pulmonary

## 2024-11-19 DIAGNOSIS — Z47.1 AFTERCARE FOLLOWING RIGHT HIP JOINT REPLACEMENT SURGERY: Primary | ICD-10-CM

## 2024-11-19 DIAGNOSIS — Z96.641 AFTERCARE FOLLOWING RIGHT HIP JOINT REPLACEMENT SURGERY: Primary | ICD-10-CM

## 2024-11-19 RX ORDER — OXYCODONE HYDROCHLORIDE 5 MG/1
5 TABLET ORAL EVERY 6 HOURS PRN
Qty: 10 TABLET | Refills: 0 | Status: SHIPPED | OUTPATIENT
Start: 2024-11-19 | End: 2024-11-26

## 2024-11-19 RX ORDER — ACETAMINOPHEN 500 MG
1000 TABLET ORAL EVERY 8 HOURS
Qty: 180 TABLET | Refills: 0 | Status: SHIPPED | OUTPATIENT
Start: 2024-11-19 | End: 2024-12-19

## 2024-11-19 RX ORDER — DOCUSATE SODIUM 100 MG/1
100 CAPSULE, LIQUID FILLED ORAL 2 TIMES DAILY
Qty: 60 CAPSULE | Refills: 0 | Status: SHIPPED | OUTPATIENT
Start: 2024-11-19 | End: 2024-12-19

## 2024-11-19 RX ORDER — ONDANSETRON 8 MG/1
8 TABLET, ORALLY DISINTEGRATING ORAL EVERY 8 HOURS PRN
Qty: 10 TABLET | Refills: 0 | Status: SHIPPED | OUTPATIENT
Start: 2024-11-19

## 2024-11-19 RX ORDER — TRAMADOL HYDROCHLORIDE 50 MG/1
50 TABLET ORAL EVERY 6 HOURS PRN
Qty: 28 TABLET | Refills: 0 | Status: SHIPPED | OUTPATIENT
Start: 2024-11-19 | End: 2024-11-26

## 2024-11-19 RX ORDER — PANTOPRAZOLE SODIUM 40 MG/1
40 TABLET, DELAYED RELEASE ORAL DAILY
Qty: 30 TABLET | Refills: 0 | Status: SHIPPED | OUTPATIENT
Start: 2024-11-19 | End: 2024-12-19

## 2024-11-19 RX ORDER — ASPIRIN 81 MG/1
81 TABLET, CHEWABLE ORAL 2 TIMES DAILY
Qty: 60 TABLET | Refills: 0 | Status: SHIPPED | OUTPATIENT
Start: 2024-11-19 | End: 2024-12-19

## 2024-11-19 RX ORDER — MELOXICAM 15 MG/1
15 TABLET ORAL DAILY
Qty: 30 TABLET | Refills: 1 | Status: SHIPPED | OUTPATIENT
Start: 2024-11-19 | End: 2025-01-18

## 2024-11-20 ENCOUNTER — ANESTHESIA EVENT (OUTPATIENT)
Facility: HOSPITAL | Age: 64
End: 2024-11-20
Payer: MEDICARE

## 2024-11-20 ENCOUNTER — TELEPHONE (OUTPATIENT)
Facility: HOSPITAL | Age: 64
End: 2024-11-20

## 2024-11-20 NOTE — TELEPHONE ENCOUNTER
Call placed to patient, ID verified x 2. Patient  has decided with their surgeon to have a total hip replacement to decrease  pain and improve mobility . Topics discussed included surgery preparation, what to expect the day of surgery, medications, physical and occupational therapy, and discharge planning.  It was discussed that this is considered an elective surgery and that prior to the surgery  decisions such as arranging for help at home once they are discharged needs to be made.Patient agreed to get home ready for surgery and to have a ride arranged to go home.He identifies his brother as his support system, a walker will be provided to him by the coordinator and he anticipates discharging home day of surgery. He lives in a two story home that requires 1 steps to enter. He was reminded to  his postoperative medications this evening as he did not realize that they were sent to [facility].  Instructions were given for CHG bathing. Patient states that he will shower with dial soap. Patient will complete the procedure the morning of surgery.  Patient was reminded not to apply any deoderant,  or lotions to skin the morning of surgery. He will remain NPO after midnight and  will take only the medications as instructed to take by his surgeon the morning of surgery with a sip of water. Patient was instructed to take his amlodipine in the morning with just a sip of water. He attended the total joint class and all education was reviewed with patient in class. He was provided with his total hip replacement book at that time.  Education regarding the importance of early and frequent ambulation to avoid surgical complications and to assist with pain was provided. Recommended the use of ice to assist with pain and swelling post op for 20 minutes an hour, not to be placed directly on his skin. Patient verbalized understanding of all information provided.  Opportunity was given to ask questions and phone number of the

## 2024-11-21 ENCOUNTER — HOSPITAL ENCOUNTER (OUTPATIENT)
Facility: HOSPITAL | Age: 64
Discharge: HOME | End: 2024-11-22
Attending: ORTHOPAEDIC SURGERY | Admitting: ORTHOPAEDIC SURGERY
Payer: MEDICARE

## 2024-11-21 ENCOUNTER — ANESTHESIA (OUTPATIENT)
Facility: HOSPITAL | Age: 64
End: 2024-11-21
Payer: MEDICARE

## 2024-11-21 ENCOUNTER — APPOINTMENT (OUTPATIENT)
Facility: HOSPITAL | Age: 64
End: 2024-11-21
Attending: ORTHOPAEDIC SURGERY
Payer: MEDICARE

## 2024-11-21 PROBLEM — Z96.641 STATUS POST TOTAL HIP REPLACEMENT, RIGHT: Status: ACTIVE | Noted: 2024-11-21

## 2024-11-21 LAB
ANION GAP SERPL CALC-SCNC: 5 MMOL/L (ref 3–18)
BASOPHILS # BLD: 0 K/UL (ref 0–0.1)
BASOPHILS NFR BLD: 1 % (ref 0–2)
BUN SERPL-MCNC: 29 MG/DL (ref 7–18)
BUN/CREAT SERPL: 28 (ref 12–20)
CALCIUM SERPL-MCNC: 10.2 MG/DL (ref 8.5–10.1)
CHLORIDE SERPL-SCNC: 105 MMOL/L (ref 100–111)
CO2 SERPL-SCNC: 27 MMOL/L (ref 21–32)
COTININE, URINE: 2 NG/ML
CREAT SERPL-MCNC: 1.04 MG/DL (ref 0.6–1.3)
DIFFERENTIAL METHOD BLD: ABNORMAL
EOSINOPHIL # BLD: 0.1 K/UL (ref 0–0.4)
EOSINOPHIL NFR BLD: 2 % (ref 0–5)
ERYTHROCYTE [DISTWIDTH] IN BLOOD BY AUTOMATED COUNT: 15.1 % (ref 11.6–14.5)
GLUCOSE BLD STRIP.AUTO-MCNC: 104 MG/DL (ref 70–110)
GLUCOSE BLD STRIP.AUTO-MCNC: 78 MG/DL (ref 70–110)
GLUCOSE SERPL-MCNC: 92 MG/DL (ref 74–99)
HCT VFR BLD AUTO: 38.5 % (ref 36–48)
HGB BLD-MCNC: 12.4 G/DL (ref 13–16)
IMM GRANULOCYTES # BLD AUTO: 0 K/UL (ref 0–0.04)
IMM GRANULOCYTES NFR BLD AUTO: 0 % (ref 0–0.5)
LYMPHOCYTES # BLD: 1.1 K/UL (ref 0.9–3.6)
LYMPHOCYTES NFR BLD: 29 % (ref 21–52)
MCH RBC QN AUTO: 29 PG (ref 24–34)
MCHC RBC AUTO-ENTMCNC: 32.2 G/DL (ref 31–37)
MCV RBC AUTO: 90.2 FL (ref 78–100)
MONOCYTES # BLD: 0.8 K/UL (ref 0.05–1.2)
MONOCYTES NFR BLD: 20 % (ref 3–10)
NEUTS SEG # BLD: 1.9 K/UL (ref 1.8–8)
NEUTS SEG NFR BLD: 50 % (ref 40–73)
NICOTINE URINE: <2 NG/ML
NRBC # BLD: 0 K/UL (ref 0–0.01)
NRBC BLD-RTO: 0 PER 100 WBC
PLATELET # BLD AUTO: 233 K/UL (ref 135–420)
PMV BLD AUTO: 10 FL (ref 9.2–11.8)
POTASSIUM SERPL-SCNC: 4.1 MMOL/L (ref 3.5–5.5)
RBC # BLD AUTO: 4.27 M/UL (ref 4.35–5.65)
SODIUM SERPL-SCNC: 137 MMOL/L (ref 136–145)
WBC # BLD AUTO: 3.9 K/UL (ref 4.6–13.2)

## 2024-11-21 PROCEDURE — 2580000003 HC RX 258

## 2024-11-21 PROCEDURE — 7100000000 HC PACU RECOVERY - FIRST 15 MIN: Performed by: ORTHOPAEDIC SURGERY

## 2024-11-21 PROCEDURE — 2720000010 HC SURG SUPPLY STERILE: Performed by: ORTHOPAEDIC SURGERY

## 2024-11-21 PROCEDURE — 0055T BONE SRGRY CMPTR CT/MRI IMAG: CPT | Performed by: ORTHOPAEDIC SURGERY

## 2024-11-21 PROCEDURE — 27130 TOTAL HIP ARTHROPLASTY: CPT

## 2024-11-21 PROCEDURE — C1776 JOINT DEVICE (IMPLANTABLE): HCPCS | Performed by: ORTHOPAEDIC SURGERY

## 2024-11-21 PROCEDURE — 3600000014 HC SURGERY LEVEL 4 ADDTL 15MIN: Performed by: ORTHOPAEDIC SURGERY

## 2024-11-21 PROCEDURE — 6360000002 HC RX W HCPCS: Performed by: ORTHOPAEDIC SURGERY

## 2024-11-21 PROCEDURE — 2580000003 HC RX 258: Performed by: NURSE ANESTHETIST, CERTIFIED REGISTERED

## 2024-11-21 PROCEDURE — 2500000003 HC RX 250 WO HCPCS

## 2024-11-21 PROCEDURE — 6370000000 HC RX 637 (ALT 250 FOR IP)

## 2024-11-21 PROCEDURE — 6360000002 HC RX W HCPCS

## 2024-11-21 PROCEDURE — 6360000002 HC RX W HCPCS: Performed by: NURSE ANESTHETIST, CERTIFIED REGISTERED

## 2024-11-21 PROCEDURE — 6370000000 HC RX 637 (ALT 250 FOR IP): Performed by: NURSE ANESTHETIST, CERTIFIED REGISTERED

## 2024-11-21 PROCEDURE — 80048 BASIC METABOLIC PNL TOTAL CA: CPT

## 2024-11-21 PROCEDURE — 82962 GLUCOSE BLOOD TEST: CPT

## 2024-11-21 PROCEDURE — 27130 TOTAL HIP ARTHROPLASTY: CPT | Performed by: ORTHOPAEDIC SURGERY

## 2024-11-21 PROCEDURE — 2580000003 HC RX 258: Performed by: ORTHOPAEDIC SURGERY

## 2024-11-21 PROCEDURE — 3700000000 HC ANESTHESIA ATTENDED CARE: Performed by: ORTHOPAEDIC SURGERY

## 2024-11-21 PROCEDURE — 3700000001 HC ADD 15 MINUTES (ANESTHESIA): Performed by: ORTHOPAEDIC SURGERY

## 2024-11-21 PROCEDURE — 73502 X-RAY EXAM HIP UNI 2-3 VIEWS: CPT

## 2024-11-21 PROCEDURE — 85025 COMPLETE CBC W/AUTO DIFF WBC: CPT

## 2024-11-21 PROCEDURE — 3600000004 HC SURGERY LEVEL 4 BASE: Performed by: ORTHOPAEDIC SURGERY

## 2024-11-21 PROCEDURE — 2709999900 HC NON-CHARGEABLE SUPPLY: Performed by: ORTHOPAEDIC SURGERY

## 2024-11-21 PROCEDURE — 7100000001 HC PACU RECOVERY - ADDTL 15 MIN: Performed by: ORTHOPAEDIC SURGERY

## 2024-11-21 DEVICE — STEM FEM HI OFFSET 6 HIP CLLRD INSIGNIA: Type: IMPLANTABLE DEVICE | Site: HIP | Status: FUNCTIONAL

## 2024-11-21 DEVICE — SHELL ACET SZ E DIA54MM 5 CLUS H TRITANIUM PRESSFIT PRI: Type: IMPLANTABLE DEVICE | Site: HIP | Status: FUNCTIONAL

## 2024-11-21 DEVICE — INSERT ACET E 0 DEG 36 MM HIP X3 TRIDENT: Type: IMPLANTABLE DEVICE | Site: HIP | Status: FUNCTIONAL

## 2024-11-21 DEVICE — HEAD FEM DIA36MM -5MM OFFSET HIP BIOLOX DELT CERAMIC TAPR: Type: IMPLANTABLE DEVICE | Site: HIP | Status: FUNCTIONAL

## 2024-11-21 RX ORDER — DEXAMETHASONE SODIUM PHOSPHATE 10 MG/ML
10 INJECTION, SOLUTION INTRAMUSCULAR; INTRAVENOUS ONCE
Status: COMPLETED | OUTPATIENT
Start: 2024-11-21 | End: 2024-11-21

## 2024-11-21 RX ORDER — ONDANSETRON 2 MG/ML
4 INJECTION INTRAMUSCULAR; INTRAVENOUS
Status: DISCONTINUED | OUTPATIENT
Start: 2024-11-21 | End: 2024-11-21 | Stop reason: HOSPADM

## 2024-11-21 RX ORDER — VANCOMYCIN HYDROCHLORIDE 1 G/20ML
INJECTION, POWDER, LYOPHILIZED, FOR SOLUTION INTRAVENOUS PRN
Status: DISCONTINUED | OUTPATIENT
Start: 2024-11-21 | End: 2024-11-21 | Stop reason: ALTCHOICE

## 2024-11-21 RX ORDER — MELOXICAM 7.5 MG/1
7.5 TABLET ORAL ONCE
Status: COMPLETED | OUTPATIENT
Start: 2024-11-21 | End: 2024-11-21

## 2024-11-21 RX ORDER — ONDANSETRON 2 MG/ML
4 INJECTION INTRAMUSCULAR; INTRAVENOUS EVERY 6 HOURS PRN
Status: DISCONTINUED | OUTPATIENT
Start: 2024-11-21 | End: 2024-11-22 | Stop reason: HOSPADM

## 2024-11-21 RX ORDER — TAMSULOSIN HYDROCHLORIDE 0.4 MG/1
0.4 CAPSULE ORAL DAILY
Status: DISCONTINUED | OUTPATIENT
Start: 2024-11-21 | End: 2024-11-21 | Stop reason: HOSPADM

## 2024-11-21 RX ORDER — INSULIN LISPRO 100 [IU]/ML
0-4 INJECTION, SOLUTION INTRAVENOUS; SUBCUTANEOUS EVERY 6 HOURS SCHEDULED
Status: DISCONTINUED | OUTPATIENT
Start: 2024-11-21 | End: 2024-11-21 | Stop reason: HOSPADM

## 2024-11-21 RX ORDER — DIPHENHYDRAMINE HCL 25 MG
25 CAPSULE ORAL EVERY 6 HOURS PRN
Status: DISCONTINUED | OUTPATIENT
Start: 2024-11-21 | End: 2024-11-22 | Stop reason: HOSPADM

## 2024-11-21 RX ORDER — ONDANSETRON 2 MG/ML
INJECTION INTRAMUSCULAR; INTRAVENOUS
Status: DISCONTINUED | OUTPATIENT
Start: 2024-11-21 | End: 2024-11-21 | Stop reason: SDUPTHER

## 2024-11-21 RX ORDER — TRAMADOL HYDROCHLORIDE 50 MG/1
50 TABLET ORAL EVERY 6 HOURS
Status: DISCONTINUED | OUTPATIENT
Start: 2024-11-21 | End: 2024-11-22 | Stop reason: HOSPADM

## 2024-11-21 RX ORDER — GLYCOPYRROLATE 0.2 MG/ML
INJECTION INTRAMUSCULAR; INTRAVENOUS
Status: DISCONTINUED | OUTPATIENT
Start: 2024-11-21 | End: 2024-11-21 | Stop reason: SDUPTHER

## 2024-11-21 RX ORDER — FENTANYL CITRATE 50 UG/ML
100 INJECTION, SOLUTION INTRAMUSCULAR; INTRAVENOUS ONCE
Status: DISCONTINUED | OUTPATIENT
Start: 2024-11-21 | End: 2024-11-21 | Stop reason: HOSPADM

## 2024-11-21 RX ORDER — INSULIN LISPRO 100 [IU]/ML
0-4 INJECTION, SOLUTION INTRAVENOUS; SUBCUTANEOUS
Status: DISCONTINUED | OUTPATIENT
Start: 2024-11-21 | End: 2024-11-21 | Stop reason: HOSPADM

## 2024-11-21 RX ORDER — LIDOCAINE HYDROCHLORIDE 20 MG/ML
INJECTION, SOLUTION EPIDURAL; INFILTRATION; INTRACAUDAL; PERINEURAL
Status: DISCONTINUED | OUTPATIENT
Start: 2024-11-21 | End: 2024-11-21 | Stop reason: SDUPTHER

## 2024-11-21 RX ORDER — FAMOTIDINE 20 MG/1
20 TABLET, FILM COATED ORAL 2 TIMES DAILY
Status: DISCONTINUED | OUTPATIENT
Start: 2024-11-21 | End: 2024-11-22 | Stop reason: HOSPADM

## 2024-11-21 RX ORDER — EPHEDRINE SULFATE/0.9% NACL/PF 25 MG/5 ML
SYRINGE (ML) INTRAVENOUS
Status: DISCONTINUED | OUTPATIENT
Start: 2024-11-21 | End: 2024-11-21 | Stop reason: SDUPTHER

## 2024-11-21 RX ORDER — MIDAZOLAM HYDROCHLORIDE 1 MG/ML
INJECTION, SOLUTION INTRAMUSCULAR; INTRAVENOUS
Status: DISCONTINUED | OUTPATIENT
Start: 2024-11-21 | End: 2024-11-21 | Stop reason: SDUPTHER

## 2024-11-21 RX ORDER — POLYETHYLENE GLYCOL 3350 17 G/17G
17 POWDER, FOR SOLUTION ORAL DAILY PRN
Status: DISCONTINUED | OUTPATIENT
Start: 2024-11-21 | End: 2024-11-22 | Stop reason: HOSPADM

## 2024-11-21 RX ORDER — DEXTROSE MONOHYDRATE 100 MG/ML
INJECTION, SOLUTION INTRAVENOUS CONTINUOUS PRN
Status: DISCONTINUED | OUTPATIENT
Start: 2024-11-21 | End: 2024-11-21 | Stop reason: HOSPADM

## 2024-11-21 RX ORDER — ROPIVACAINE HYDROCHLORIDE 5 MG/ML
30 INJECTION, SOLUTION EPIDURAL; INFILTRATION; PERINEURAL ONCE
Status: DISCONTINUED | OUTPATIENT
Start: 2024-11-21 | End: 2024-11-21 | Stop reason: HOSPADM

## 2024-11-21 RX ORDER — ACETAMINOPHEN 500 MG
1000 TABLET ORAL ONCE
Status: COMPLETED | OUTPATIENT
Start: 2024-11-21 | End: 2024-11-21

## 2024-11-21 RX ORDER — FENTANYL CITRATE 50 UG/ML
25 INJECTION, SOLUTION INTRAMUSCULAR; INTRAVENOUS EVERY 5 MIN PRN
Status: DISCONTINUED | OUTPATIENT
Start: 2024-11-21 | End: 2024-11-21 | Stop reason: HOSPADM

## 2024-11-21 RX ORDER — EPHEDRINE SULFATE/0.9% NACL/PF 50 MG/5 ML
SYRINGE (ML) INTRAVENOUS
Status: DISCONTINUED | OUTPATIENT
Start: 2024-11-21 | End: 2024-11-21

## 2024-11-21 RX ORDER — SODIUM CHLORIDE, SODIUM LACTATE, POTASSIUM CHLORIDE, CALCIUM CHLORIDE 600; 310; 30; 20 MG/100ML; MG/100ML; MG/100ML; MG/100ML
INJECTION, SOLUTION INTRAVENOUS CONTINUOUS
Status: DISCONTINUED | OUTPATIENT
Start: 2024-11-21 | End: 2024-11-21 | Stop reason: HOSPADM

## 2024-11-21 RX ORDER — ONDANSETRON 4 MG/1
4 TABLET, ORALLY DISINTEGRATING ORAL EVERY 8 HOURS PRN
Status: DISCONTINUED | OUTPATIENT
Start: 2024-11-21 | End: 2024-11-22 | Stop reason: HOSPADM

## 2024-11-21 RX ORDER — SODIUM CHLORIDE 0.9 % (FLUSH) 0.9 %
5-40 SYRINGE (ML) INJECTION EVERY 12 HOURS SCHEDULED
Status: DISCONTINUED | OUTPATIENT
Start: 2024-11-21 | End: 2024-11-22 | Stop reason: HOSPADM

## 2024-11-21 RX ORDER — ASPIRIN 81 MG/1
81 TABLET ORAL 2 TIMES DAILY
Status: DISCONTINUED | OUTPATIENT
Start: 2024-11-22 | End: 2024-11-22 | Stop reason: HOSPADM

## 2024-11-21 RX ORDER — SODIUM PHOSPHATE, DIBASIC AND SODIUM PHOSPHATE, MONOBASIC 7; 19 G/230ML; G/230ML
1 ENEMA RECTAL DAILY PRN
Status: DISCONTINUED | OUTPATIENT
Start: 2024-11-21 | End: 2024-11-22 | Stop reason: HOSPADM

## 2024-11-21 RX ORDER — SODIUM CHLORIDE 9 MG/ML
INJECTION, SOLUTION INTRAVENOUS PRN
Status: DISCONTINUED | OUTPATIENT
Start: 2024-11-21 | End: 2024-11-21 | Stop reason: HOSPADM

## 2024-11-21 RX ORDER — SODIUM CHLORIDE 0.9 % (FLUSH) 0.9 %
5-40 SYRINGE (ML) INJECTION PRN
Status: DISCONTINUED | OUTPATIENT
Start: 2024-11-21 | End: 2024-11-21 | Stop reason: HOSPADM

## 2024-11-21 RX ORDER — KETOROLAC TROMETHAMINE 15 MG/ML
15 INJECTION, SOLUTION INTRAMUSCULAR; INTRAVENOUS EVERY 6 HOURS
Status: DISCONTINUED | OUTPATIENT
Start: 2024-11-21 | End: 2024-11-22 | Stop reason: HOSPADM

## 2024-11-21 RX ORDER — PROPOFOL 10 MG/ML
INJECTION, EMULSION INTRAVENOUS
Status: DISCONTINUED | OUTPATIENT
Start: 2024-11-21 | End: 2024-11-21 | Stop reason: SDUPTHER

## 2024-11-21 RX ORDER — DIPHENHYDRAMINE HYDROCHLORIDE 50 MG/ML
25 INJECTION INTRAMUSCULAR; INTRAVENOUS EVERY 6 HOURS PRN
Status: DISCONTINUED | OUTPATIENT
Start: 2024-11-21 | End: 2024-11-22 | Stop reason: HOSPADM

## 2024-11-21 RX ORDER — BISACODYL 5 MG/1
5 TABLET, DELAYED RELEASE ORAL DAILY
Status: DISCONTINUED | OUTPATIENT
Start: 2024-11-21 | End: 2024-11-22 | Stop reason: HOSPADM

## 2024-11-21 RX ORDER — OXYCODONE HYDROCHLORIDE 10 MG/1
10 TABLET ORAL EVERY 4 HOURS PRN
Status: DISCONTINUED | OUTPATIENT
Start: 2024-11-21 | End: 2024-11-22 | Stop reason: HOSPADM

## 2024-11-21 RX ORDER — ACETAMINOPHEN 325 MG/1
650 TABLET ORAL
Status: DISCONTINUED | OUTPATIENT
Start: 2024-11-21 | End: 2024-11-21 | Stop reason: HOSPADM

## 2024-11-21 RX ORDER — MIDAZOLAM HYDROCHLORIDE 2 MG/2ML
2 INJECTION, SOLUTION INTRAMUSCULAR; INTRAVENOUS ONCE
Status: DISCONTINUED | OUTPATIENT
Start: 2024-11-21 | End: 2024-11-21 | Stop reason: HOSPADM

## 2024-11-21 RX ORDER — NALOXONE HYDROCHLORIDE 0.4 MG/ML
INJECTION, SOLUTION INTRAMUSCULAR; INTRAVENOUS; SUBCUTANEOUS PRN
Status: DISCONTINUED | OUTPATIENT
Start: 2024-11-21 | End: 2024-11-21 | Stop reason: HOSPADM

## 2024-11-21 RX ORDER — ACETAMINOPHEN 500 MG
1000 TABLET ORAL EVERY 8 HOURS SCHEDULED
Status: DISCONTINUED | OUTPATIENT
Start: 2024-11-21 | End: 2024-11-22 | Stop reason: HOSPADM

## 2024-11-21 RX ORDER — SODIUM CHLORIDE 0.9 % (FLUSH) 0.9 %
5-40 SYRINGE (ML) INJECTION EVERY 12 HOURS SCHEDULED
Status: DISCONTINUED | OUTPATIENT
Start: 2024-11-21 | End: 2024-11-21 | Stop reason: HOSPADM

## 2024-11-21 RX ORDER — PHENYLEPHRINE HCL IN 0.9% NACL 1 MG/10 ML
SYRINGE (ML) INTRAVENOUS
Status: DISCONTINUED | OUTPATIENT
Start: 2024-11-21 | End: 2024-11-21 | Stop reason: SDUPTHER

## 2024-11-21 RX ORDER — OXYCODONE HYDROCHLORIDE 10 MG/1
10 TABLET ORAL PRN
Status: DISCONTINUED | OUTPATIENT
Start: 2024-11-21 | End: 2024-11-21 | Stop reason: HOSPADM

## 2024-11-21 RX ORDER — SODIUM CHLORIDE 0.9 % (FLUSH) 0.9 %
5-40 SYRINGE (ML) INJECTION PRN
Status: DISCONTINUED | OUTPATIENT
Start: 2024-11-21 | End: 2024-11-22 | Stop reason: HOSPADM

## 2024-11-21 RX ORDER — FAMOTIDINE 20 MG/1
20 TABLET, FILM COATED ORAL ONCE
Status: COMPLETED | OUTPATIENT
Start: 2024-11-21 | End: 2024-11-21

## 2024-11-21 RX ORDER — OXYCODONE HYDROCHLORIDE 5 MG/1
5 TABLET ORAL PRN
Status: DISCONTINUED | OUTPATIENT
Start: 2024-11-21 | End: 2024-11-21 | Stop reason: HOSPADM

## 2024-11-21 RX ORDER — SODIUM CHLORIDE 9 MG/ML
INJECTION, SOLUTION INTRAVENOUS PRN
Status: DISCONTINUED | OUTPATIENT
Start: 2024-11-21 | End: 2024-11-22 | Stop reason: HOSPADM

## 2024-11-21 RX ORDER — OXYCODONE HYDROCHLORIDE 5 MG/1
5 TABLET ORAL EVERY 4 HOURS PRN
Status: DISCONTINUED | OUTPATIENT
Start: 2024-11-21 | End: 2024-11-22 | Stop reason: HOSPADM

## 2024-11-21 RX ORDER — APREPITANT 40 MG/1
40 CAPSULE ORAL ONCE
Status: COMPLETED | OUTPATIENT
Start: 2024-11-21 | End: 2024-11-21

## 2024-11-21 RX ADMIN — EPHEDRINE SULFATE 5 MG: 5 INJECTION INTRAVENOUS at 14:56

## 2024-11-21 RX ADMIN — Medication 50 MCG: at 16:31

## 2024-11-21 RX ADMIN — TRAMADOL HYDROCHLORIDE 50 MG: 50 TABLET, COATED ORAL at 18:55

## 2024-11-21 RX ADMIN — ACETAMINOPHEN 1000 MG: 500 TABLET ORAL at 13:33

## 2024-11-21 RX ADMIN — WATER 2000 MG: 1 INJECTION INTRAMUSCULAR; INTRAVENOUS; SUBCUTANEOUS at 14:47

## 2024-11-21 RX ADMIN — KETOROLAC TROMETHAMINE 15 MG: 15 INJECTION, SOLUTION INTRAMUSCULAR; INTRAVENOUS at 17:55

## 2024-11-21 RX ADMIN — PROPOFOL 90 MCG/KG/MIN: 10 INJECTION, EMULSION INTRAVENOUS at 14:45

## 2024-11-21 RX ADMIN — WATER 2000 MG: 1 INJECTION INTRAMUSCULAR; INTRAVENOUS; SUBCUTANEOUS at 23:21

## 2024-11-21 RX ADMIN — LIDOCAINE HYDROCHLORIDE 40 MG: 20 INJECTION, SOLUTION EPIDURAL; INFILTRATION; INTRACAUDAL; PERINEURAL at 14:44

## 2024-11-21 RX ADMIN — GLYCOPYRROLATE 0.1 MG: 0.2 INJECTION INTRAMUSCULAR; INTRAVENOUS at 16:20

## 2024-11-21 RX ADMIN — SODIUM CHLORIDE, SODIUM LACTATE, POTASSIUM CHLORIDE, AND CALCIUM CHLORIDE: 600; 310; 30; 20 INJECTION, SOLUTION INTRAVENOUS at 16:51

## 2024-11-21 RX ADMIN — GLYCOPYRROLATE 0.2 MG: 0.2 INJECTION INTRAMUSCULAR; INTRAVENOUS at 14:30

## 2024-11-21 RX ADMIN — BISACODYL 5 MG: 5 TABLET, COATED ORAL at 19:07

## 2024-11-21 RX ADMIN — DEXAMETHASONE SODIUM PHOSPHATE 10 MG: 10 INJECTION INTRAMUSCULAR; INTRAVENOUS at 14:49

## 2024-11-21 RX ADMIN — TRANEXAMIC ACID 1000 MG: 100 INJECTION, SOLUTION INTRAVENOUS at 14:49

## 2024-11-21 RX ADMIN — MELOXICAM 7.5 MG: 7.5 TABLET ORAL at 13:33

## 2024-11-21 RX ADMIN — KETOROLAC TROMETHAMINE 15 MG: 15 INJECTION, SOLUTION INTRAMUSCULAR; INTRAVENOUS at 23:29

## 2024-11-21 RX ADMIN — ACETAMINOPHEN 1000 MG: 500 TABLET ORAL at 23:20

## 2024-11-21 RX ADMIN — FAMOTIDINE 20 MG: 20 TABLET ORAL at 20:54

## 2024-11-21 RX ADMIN — APREPITANT 40 MG: 40 CAPSULE ORAL at 13:32

## 2024-11-21 RX ADMIN — EPHEDRINE SULFATE 5 MG: 5 INJECTION INTRAVENOUS at 15:46

## 2024-11-21 RX ADMIN — PROPOFOL 20 MG: 10 INJECTION, EMULSION INTRAVENOUS at 15:51

## 2024-11-21 RX ADMIN — OXYCODONE HYDROCHLORIDE 5 MG: 5 TABLET ORAL at 20:53

## 2024-11-21 RX ADMIN — SODIUM CHLORIDE, SODIUM LACTATE, POTASSIUM CHLORIDE, AND CALCIUM CHLORIDE: 600; 310; 30; 20 INJECTION, SOLUTION INTRAVENOUS at 13:20

## 2024-11-21 RX ADMIN — ONDANSETRON 4 MG: 2 INJECTION, SOLUTION INTRAMUSCULAR; INTRAVENOUS at 16:25

## 2024-11-21 RX ADMIN — Medication 100 MCG: at 16:16

## 2024-11-21 RX ADMIN — TRANEXAMIC ACID 1000 MG: 100 INJECTION, SOLUTION INTRAVENOUS at 16:24

## 2024-11-21 RX ADMIN — MEPIVACAINE HYDROCHLORIDE 60 MG: 20 INJECTION, SOLUTION EPIDURAL; INFILTRATION at 14:42

## 2024-11-21 RX ADMIN — FAMOTIDINE 20 MG: 20 TABLET ORAL at 13:33

## 2024-11-21 RX ADMIN — PROPOFOL 50 MG: 10 INJECTION, EMULSION INTRAVENOUS at 14:44

## 2024-11-21 RX ADMIN — PROPOFOL 20 MG: 10 INJECTION, EMULSION INTRAVENOUS at 16:14

## 2024-11-21 RX ADMIN — MIDAZOLAM 2 MG: 1 INJECTION, SOLUTION INTRAMUSCULAR; INTRAVENOUS at 14:30

## 2024-11-21 RX ADMIN — EPHEDRINE SULFATE 5 MG: 5 INJECTION INTRAVENOUS at 15:34

## 2024-11-21 ASSESSMENT — PAIN DESCRIPTION - PAIN TYPE: TYPE: SURGICAL PAIN

## 2024-11-21 ASSESSMENT — PAIN DESCRIPTION - ONSET: ONSET: GRADUAL

## 2024-11-21 ASSESSMENT — PAIN DESCRIPTION - FREQUENCY: FREQUENCY: INTERMITTENT

## 2024-11-21 ASSESSMENT — PAIN SCALES - GENERAL
PAINLEVEL_OUTOF10: 6
PAINLEVEL_OUTOF10: 0

## 2024-11-21 ASSESSMENT — PAIN SCALES - WONG BAKER: WONGBAKER_NUMERICALRESPONSE: NO HURT

## 2024-11-21 ASSESSMENT — PAIN DESCRIPTION - LOCATION: LOCATION: HIP

## 2024-11-21 ASSESSMENT — PAIN - FUNCTIONAL ASSESSMENT
PAIN_FUNCTIONAL_ASSESSMENT: ACTIVITIES ARE NOT PREVENTED
PAIN_FUNCTIONAL_ASSESSMENT: 0-10

## 2024-11-21 ASSESSMENT — PAIN DESCRIPTION - ORIENTATION: ORIENTATION: RIGHT

## 2024-11-21 ASSESSMENT — PAIN DESCRIPTION - DESCRIPTORS: DESCRIPTORS: ACHING

## 2024-11-21 ASSESSMENT — PAIN DESCRIPTION - DIRECTION: RADIATING_TOWARDS: RIGHT LEG

## 2024-11-21 NOTE — PERIOP NOTE
Patient /Family /Designee has been informed that [facility] is not responsible for patient belongings per policy and the signed [facility] Patient Agreement document.  Personal items should be sent home or checked in with security.  Patient /Family /Designee selected the following action:                            [x]  Send personal items home with a family member or friend                                                 []  Check in personal items with security, excluding clothing                            []  Maintain personal items at the bedside, against recommendation                                 by Jaxson Medina [facility]                                  All personal items given to Quoc Rios

## 2024-11-21 NOTE — OP NOTE
Operative Note      Patient: Robert Clements Jr.  YOB: 1960  MRN: [id number]    Date of Procedure: 11/21/2024    Pre-Op Diagnosis Codes:      * Primary osteoarthritis of right hip [M16.11]    Post-Op Diagnosis: Same       Procedure(s):  right Hip Total Arthroplasty with BILLIE, direct anterior approach    Surgeon(s):  Sunny Arthur DO    Assistant:   Surgical Assistant: Rosemarie Youngblood  Physician Assistant: Ramona Jean PA-C    Anesthesia: Spinal    Estimated Blood Loss (mL): 300     Complications: None    Specimens:   * No specimens in log *    Implants:  Implant Name Type Inv. Item Serial No.  Lot No. LRB No. Used Action   INSERT ACET E 0 DEG 36 MM HIP X3 TRIDENT - [id number]  INSERT ACET E 0 DEG 36 MM HIP X3 TRIDENT  [employer] righ[employer] [id number] Right 1 Implanted   SHELL ACET SZ E [id number] CLUS H TRITANIUM PRESSFIT DIETER - [id number]  SHELL ACET SZ E [id number] CLUS H TRITANIUM PRESSFIT DIETER  [employer] r[employer]ine[employer] [id number] Right 1 Implanted   HEAD FEM [id number] -5MM OFFSET HIP BIOLOX DELT CERAMIC TAPR - [id number]  HEAD FEM [id number] -5MM OFFSET HIP BIOLOX DELT CERAMIC TAPR  [employer] [id number] Right 1 Implanted   STEM FEM HI OFFSET 6 HIP CLLRD INSIGNIA - [id number]  STEM FEM HI OFFSET 6 HIP CLLRD INSIGNIA  [employer] [id number] Right 1 Implanted         Drains: * No LDAs found *    Findings:  Infection Present At Time Of Surgery (PATOS) (choose all levels that have infection present):  No infection present  Other Findings: done    Implants:   [place]:   Femur stem: Insignia size 6, high offset   Acetabulum cup: 54mm   Bearing: neutral poly   Head: 36mm ceramic, -5mm offset      ERIKA Moore was present for the procedure and vital for the performance of the procedure. ERIKA Moore assisted with positioning, prepping, draping of the patient before the procedure and instrument manipulation/placement, spinal repositioning and

## 2024-11-21 NOTE — ANESTHESIA POSTPROCEDURE EVALUATION
Department of Anesthesiology  Postprocedure Note    Patient: Robert Clements Jr.  MRN: [id number]  YOB: 1960  Date of evaluation: 11/21/2024    Procedure Summary       Date: 11/21/24 Room / Location: [facility] MAIN 05 / [facility] MAIN OR    Anesthesia Start: 1430 Anesthesia Stop: 1703    Procedure: right Hip Total Arthroplasty with BILLIE, direct anterior approach (Right: Hip) Diagnosis:       Primary osteoarthritis of right hip      (Primary osteoarthritis of right hip [M16.11])    Surgeons: Sunny Arthur DO Responsible Provider: Michael Block MD    Anesthesia Type: Spinal, MAC ASA Status: 2            Anesthesia Type: Spinal, MAC    Nico Phase I: Nico Score: 10    Nico Phase II:      Anesthesia Post Evaluation    Patient location during evaluation: PACU  Patient participation: complete - patient participated  Level of consciousness: sleepy but conscious  Pain score: 0  Airway patency: patent  Nausea & Vomiting: no nausea and no vomiting  Cardiovascular status: blood pressure returned to baseline  Respiratory status: acceptable  Hydration status: euvolemic  Pain management: adequate    No notable events documented.

## 2024-11-21 NOTE — INTERVAL H&P NOTE
Update History & Physical    The patient's History and Physical of November 18, 2024 was reviewed with the patient and I examined the patient. There was no change. The surgical site was confirmed by the patient and me.     Plan: The risks, benefits, expected outcome, and alternative to the recommended procedure have been discussed with the patient. Patient understands and wants to proceed with the procedure.     Electronically signed by Sunny Arthur DO on 11/21/2024 at 1:28 PM

## 2024-11-21 NOTE — ANESTHESIA PRE PROCEDURE
Department of Anesthesiology  Preprocedure Note       Name:  Robert Clements Jr.   Age:  64 y.o.  :  1960                                          MRN:  [id number]         Date:  2024      Surgeon: Surgeon(s):  Sunny Arthur DO    Procedure: Procedure(s):  RIGHT TOTAL HIP ARTHROPLASTY DIRECT ANTERIOR APPROACH WITH BILLIE PROTOCOL; [[employer]] 2 SA'S ; C ARM; HANA TABLE; ERAS    Medications prior to admission:   Prior to Admission medications    Medication Sig Start Date End Date Taking? Authorizing Provider   ondansetron (ZOFRAN-ODT) 8 MG TBDP disintegrating tablet Take 1 tablet by mouth every 8 hours as needed for Nausea or Vomiting 24   Ramona Jean PA-C   acetaminophen (TYLENOL) 500 MG tablet Take 2 tablets by mouth in the morning and 2 tablets at noon and 2 tablets in the evening. 24  Ramona Jean PA-C   traMADol (ULTRAM) 50 MG tablet Take 1 tablet by mouth every 6 hours as needed for Pain for up to 7 days. Intended supply: 7 days. Take lowest dose possible to manage pain Max Daily Amount: 200 mg 24  Ramona Jean PA-C   pantoprazole (PROTONIX) 40 MG tablet Take 1 tablet by mouth daily 24  Ramona Jean PA-C   oxyCODONE (ROXICODONE) 5 MG immediate release tablet Take 1 tablet by mouth every 6 hours as needed for Pain for up to 7 days. Intended supply: 7 days. Take lowest dose possible to manage pain For breakthrough pain not controlled by tramadol. Not to be taken within 1 hour of tramadol. Max Daily Amount: 20 mg 24  Ramona Jean PA-C   meloxicam (MOBIC) 15 MG tablet Take 1 tablet by mouth daily Start with 1 pill the day before surgery and then daily therafter 24  Ramona Jean PA-C   docusate sodium (COLACE) 100 MG capsule Take 1 capsule by mouth 2 times daily 24  Ramona Jean PA-C   aspirin (ASPIRIN 81) 81 MG chewable tablet Take 1 tablet by mouth 2 times daily

## 2024-11-21 NOTE — ANESTHESIA PROCEDURE NOTES
Spinal Block    Patient location during procedure: OR  End time: 11/21/2024 2:42 PM  Reason for block: primary anesthetic  Staffing  Performed: resident/CRNA   Anesthesiologist: Michael Block MD  Resident/CRNA: Xiomara Ramirez RN  Performed by: Xiomara Ramirez RN  Authorized by: Michael Block MD    Spinal Block  Patient position: sitting  Prep: Betadine  Patient monitoring: continuous pulse ox and frequent blood pressure checks  Approach: midline  Location: L3/L4  Provider prep: mask and sterile gloves  Local infiltration: lidocaine  Needle  Needle type: Berhane   Needle gauge: 25 G  Needle length: 3.5 in  Assessment  Sensory level: T10  Swirl obtained: Yes  CSF: clear  Attempts: 1  Hemodynamics: stable  Additional Notes  Exp date: 1/31/2026  LOT: [id number]  Preanesthetic Checklist  Completed: patient identified, IV checked, site marked, risks and benefits discussed, surgical/procedural consents, equipment checked, pre-op evaluation, timeout performed, anesthesia consent given, oxygen available, monitors applied/VS acknowledged, fire risk safety assessment completed and verbalized and blood product R/B/A discussed and consented

## 2024-11-21 NOTE — FLOWSHEET NOTE
End of shift handoff given to Ifeanyi Armstrong Rn at 1450 and all pertinent information about the patient and the case was given prior to leaving.

## 2024-11-22 VITALS
SYSTOLIC BLOOD PRESSURE: 116 MMHG | TEMPERATURE: 98.2 F | HEIGHT: 76 IN | OXYGEN SATURATION: 98 % | BODY MASS INDEX: 24.73 KG/M2 | DIASTOLIC BLOOD PRESSURE: 75 MMHG | RESPIRATION RATE: 18 BRPM | WEIGHT: 203.04 LBS | HEART RATE: 53 BPM

## 2024-11-22 LAB
ANION GAP SERPL CALC-SCNC: 9 MMOL/L (ref 3–18)
BUN SERPL-MCNC: 31 MG/DL (ref 7–18)
BUN/CREAT SERPL: 25 (ref 12–20)
CALCIUM SERPL-MCNC: 9.6 MG/DL (ref 8.5–10.1)
CHLORIDE SERPL-SCNC: 101 MMOL/L (ref 100–111)
CO2 SERPL-SCNC: 25 MMOL/L (ref 21–32)
CREAT SERPL-MCNC: 1.25 MG/DL (ref 0.6–1.3)
EST. AVERAGE GLUCOSE BLD GHB EST-MCNC: 114 MG/DL
GLUCOSE SERPL-MCNC: 128 MG/DL (ref 74–99)
HBA1C MFR BLD: 5.6 % (ref 4.2–5.6)
HCT VFR BLD AUTO: 35.4 % (ref 36–48)
HGB BLD-MCNC: 11.6 G/DL (ref 13–16)
POTASSIUM SERPL-SCNC: 4.3 MMOL/L (ref 3.5–5.5)
SODIUM SERPL-SCNC: 135 MMOL/L (ref 136–145)

## 2024-11-22 PROCEDURE — 85018 HEMOGLOBIN: CPT

## 2024-11-22 PROCEDURE — 97116 GAIT TRAINING THERAPY: CPT

## 2024-11-22 PROCEDURE — 97162 PT EVAL MOD COMPLEX 30 MIN: CPT

## 2024-11-22 PROCEDURE — 97535 SELF CARE MNGMENT TRAINING: CPT

## 2024-11-22 PROCEDURE — 36415 COLL VENOUS BLD VENIPUNCTURE: CPT

## 2024-11-22 PROCEDURE — 2580000003 HC RX 258

## 2024-11-22 PROCEDURE — 6370000000 HC RX 637 (ALT 250 FOR IP)

## 2024-11-22 PROCEDURE — 6360000002 HC RX W HCPCS

## 2024-11-22 PROCEDURE — 85014 HEMATOCRIT: CPT

## 2024-11-22 PROCEDURE — 83036 HEMOGLOBIN GLYCOSYLATED A1C: CPT

## 2024-11-22 PROCEDURE — 80048 BASIC METABOLIC PNL TOTAL CA: CPT

## 2024-11-22 PROCEDURE — 97166 OT EVAL MOD COMPLEX 45 MIN: CPT

## 2024-11-22 RX ADMIN — FAMOTIDINE 20 MG: 20 TABLET ORAL at 09:38

## 2024-11-22 RX ADMIN — TRAMADOL HYDROCHLORIDE 50 MG: 50 TABLET, COATED ORAL at 06:43

## 2024-11-22 RX ADMIN — KETOROLAC TROMETHAMINE 15 MG: 15 INJECTION, SOLUTION INTRAMUSCULAR; INTRAVENOUS at 05:57

## 2024-11-22 RX ADMIN — TRAMADOL HYDROCHLORIDE 50 MG: 50 TABLET, COATED ORAL at 02:12

## 2024-11-22 RX ADMIN — ASPIRIN 81 MG: 81 TABLET, COATED ORAL at 09:38

## 2024-11-22 RX ADMIN — BISACODYL 5 MG: 5 TABLET, COATED ORAL at 09:38

## 2024-11-22 RX ADMIN — WATER 2000 MG: 1 INJECTION INTRAMUSCULAR; INTRAVENOUS; SUBCUTANEOUS at 06:42

## 2024-11-22 RX ADMIN — ACETAMINOPHEN 1000 MG: 500 TABLET ORAL at 05:53

## 2024-11-22 ASSESSMENT — PAIN SCALES - GENERAL
PAINLEVEL_OUTOF10: 0
PAINLEVEL_OUTOF10: 0

## 2024-11-22 ASSESSMENT — PAIN SCALES - WONG BAKER
WONGBAKER_NUMERICALRESPONSE: NO HURT
WONGBAKER_NUMERICALRESPONSE: NO HURT

## 2024-11-22 NOTE — PROGRESS NOTES
OCCUPATIONAL THERAPY EVALUATION/DISCHARGE    Patient: Robert Clements Jr. (64 y.o. male)  Date: 11/22/2024  Primary Diagnosis: Primary osteoarthritis of right hip [M16.11]  Status post total hip replacement, right [Z96.641]  Procedure(s) (LRB):  right Hip Total Arthroplasty with BILLIE, direct anterior approach (Right) 1 Day Post-Op   Precautions: Fall Risk, General Precautions, Weight Bearing, Right Lower Extremity Weight Bearing: Weight Bearing As Tolerated,  ,  ,  ,  ,  ,    PLOF: Pt was independent with self-care and used a cane for functional mobility.      ASSESSMENT AND RECOMMENDATIONS:  Pt cleared to participate in OT evaluation by RN. Pt supine in bed, alert, and agreeable to participate. Pt educated on weight-bearing status, importance of ice and safety during this admission/around the house. Pt is independent - modified independent with basic self-care and supervision with functional transfer to chair simulating toilet transfer using rolling walker in preparation for ADLs. Based on the objective data described below, pt presents with no deficits that impede pt function with ADLs, functional transfers, and functional mobility in preparation for selfcare tasks.  Pt left seated in chair dressed for d/c with all needs met and call bell in reach.      Maximum therapeutic gains met at current level of care and patient will be discharged from occupational therapy at this time.    Further Equipment Recommendations for Discharge: Educated pt on raised toilet seat d/t pt's height/discomfort     AMPAC: AM-PAC Inpatient Daily Activity Raw Score: 24    Current research shows that an AM-PAC score of 18 or greater is associated with a discharge to the patient's home setting.    This AMPAC score should be considered in conjunction with interdisciplinary team recommendations to determine the most appropriate discharge setting. Patient's social support, diagnosis, medical stability, and prior level of function should also be

## 2024-11-22 NOTE — PROGRESS NOTES
Patient is alert and oriented times 4, able to communicate needs. Full sensation with no numbness, tingling, and drainage is notice at surgical site. Patient tolerate medications and care well, patient ambulate at the navarrete way and voided. Patient tolerate ambulation well, no distress or discomfort is notice.

## 2024-11-22 NOTE — PROGRESS NOTES
4 Eyes Skin Assessment     NAME:  Rboert Clements Jr.  YOB: 1960  MEDICAL RECORD NUMBER:  [id number]    The patient is being assessed for  Shift Handoff    I agree that at least one RN has performed a thorough Head to Toe Skin Assessment on the patient. ALL assessment sites listed below have been assessed.      Areas assessed by both nurses:    Head, Face, Ears, Shoulders, Back, Chest, Arms, Elbows, Hands, Sacrum. Buttock, Coccyx, Ischium, Legs. Feet and Heels, and Under Medical Devices         Does the Patient have a Wound? Yes wound(s) were present on assessment. LDA wound assessment was Initiated and completed by RN       Warren Prevention initiated by RN: Yes  Wound Care Orders initiated by RN: Yes    Pressure Injury (Stage 3,4, Unstageable, DTI, NWPT, and Complex wounds) if present, place Wound referral order by RN under : Yes    New Ostomies, if present place, Ostomy referral order under : Yes     Nurse 1 eSignature: Electronically signed by Jcarlos Bass RN on 11/22/24 at 6:52 AM EST    **SHARE this note so that the co-signing nurse can place an eSignature**    Nurse 2 eSignature: Electronically signed by Milla Spence RN on 11/22/24 at 10:47 AM EST

## 2024-11-22 NOTE — PERIOP NOTE
TRANSFER - OUT REPORT:    Telephone report given to Carmela on Robert Clements Jr.  being transferred to [facility] for routine post-op       Report consisted of patient's Situation, Background, Assessment and   Recommendations(SBAR).     Information from the following report(s) Surgery Report and MAR was reviewed with the receiving nurse.           Lines:   Peripheral IV 06/06/24 Left Antecubital (Active)   Site Assessment Clean, dry & intact 11/21/24 1855   Line Status Normal saline locked 11/21/24 1855   Line Care Ports disinfected;Cap changed;Connections checked and tightened 11/21/24 1855   Phlebitis Assessment No symptoms 11/21/24 1855   Infiltration Assessment 0 11/21/24 1855   Alcohol Cap Used Yes 11/21/24 1855   Dressing Status Clean, dry & intact 11/21/24 1855   Dressing Type Transparent 11/21/24 1855        Opportunity for questions and clarification was provided.      Patient transported with:  Tech

## 2024-11-22 NOTE — PROGRESS NOTES
Physical Therapy  PHYSICAL THERAPY EVALUATION/DISCHARGE    Patient: Robert Clements Jr. (64 y.o. male)  Date: 11/22/2024  Primary Diagnosis: Primary osteoarthritis of right hip [M16.11]  Status post total hip replacement, right [Z96.641]  Procedure(s) (LRB):  right Hip Total Arthroplasty with BILLIE, direct anterior approach (Right) 1 Day Post-Op   Precautions: Fall Risk, General Precautions, Weight Bearing, Right Lower Extremity Weight Bearing: Weight Bearing As Tolerated,  ,  ,  ,  ,  , Hip Precautions: Anterior hip precautions  PLOF: Lives alone in a 2nd floor apt with 12 BO, B rails. Ind prior to admission.     ASSESSMENT AND RECOMMENDATIONS:  Patient is a 65 yo male admitted to hospital for R FAIZA and presents today POD 1 and agreeable to therapy. Patient was educated on precautions, weight bearing status, and role of therapy. Patient was given demo with instruction on sit <> stand transfer, gait training, and stair training. Patient transferred to standing with mod I and ambulated 250 ft with RW. Pt progresses from step-to to step-through. Pt completes 4 steps with appropriate step-to pattern and bilateral railing use.  At conclusion of session patient transferred to sitting in recliner and was left resting with call bell by the side. Pt demonstrated good compliance with precautions. Patient instructed to call for assistance if they needed to get up for any reason and denied need for further assistance. Patient demonstrates deficits in strength, mobility, balance, and gait and will benefit from outpatient once d/c home. Pt is cleared from an acute care PT standpoint for safe d/c home.       Patient does not require further skilled physical therapy intervention at this level of care.    Further Equipment Recommendations for Discharge: Patient has all needed DME for home safety.    [facility]: AM-PAC Inpatient Mobility Raw Score : 20      Current research shows that an AM-PAC score of 18 (14 without stairs) or greater is  associated with a discharge to the patient's home setting.    This AMPAC score should be considered in conjunction with interdisciplinary team recommendations to determine the most appropriate discharge setting. Patient's social support, diagnosis, medical stability, and prior level of function should also be taken into consideration.     SUBJECTIVE:   Patient stated “I got some steps.”    OBJECTIVE DATA SUMMARY:     Past Medical History:   Diagnosis Date    Arthritis     Diabetes (HCC)     HLD (hyperlipidemia)     Hypertension      Past Surgical History:   Procedure Laterality Date    IR CHOLECYSTOSTOMY PERCUTANEOUS COMPLETE      OTHER SURGICAL HISTORY      I&D hemorrhoids x 2    TOTAL HIP ARTHROPLASTY Right 11/21/2024    right Hip Total Arthroplasty with BILLIE, direct anterior approach performed by Sunny Arthur DO at [facility] MAIN OR       Home Situation:  Social/Functional History  Lives With: Alone  Type of Home: Apartment  Home Layout: One level  Home Access: Stairs to enter with rails  Entrance Stairs - Number of Steps: 12 Steps to enter the apartment.  Entrance Stairs - Rails: Both  Bathroom Shower/Tub: Tub/Shower unit  Bathroom Toilet: Standard  Bathroom Equipment: Grab bars in shower, Commode  Bathroom Accessibility: Accessible  Home Equipment: Walker - Rolling, Cane  Has the patient had two or more falls in the past year or any fall with injury in the past year?: No  Receives Help From: Family  ADL Assistance: Independent  Homemaking Assistance: Independent  Homemaking Responsibilities: Yes  Ambulation Assistance: Independent  Transfer Assistance: Independent  Active : No  Patient's  Info: Patient's Sister-in-law.  Mode of Transportation: SUV  Education: Not applicable.  Occupation: Full time employment  Type of Occupation: [employer].  Critical Behavior:  Orientation  Overall Orientation Status: Within Normal Limits  Orientation Level: Oriented X4  Cognition  Overall Cognitive Status:

## 2024-11-22 NOTE — PROGRESS NOTES
Patient was able to transfer from stretcher to his bed by walking with a walker and with 1 assist. He tolerated ambulation fairly well. Patient lying on bed, AOX4 on room air, not in distress. He still has to be seen by PT and OT.

## 2024-11-22 NOTE — PROGRESS NOTES
4 Eyes Skin Assessment     NAME:  Robert Clements Jr.  YOB: 1960  MEDICAL RECORD NUMBER:  [id number]    The patient is being assessed for  Admission    I agree that at least one RN has performed a thorough Head to Toe Skin Assessment on the patient. ALL assessment sites listed below have been assessed.      Areas assessed by both nurses:    Head, Face, Ears, Shoulders, Back, Chest, Arms, Elbows, Hands, Sacrum. Buttock, Coccyx, Ischium, Legs. Feet and Heels, Under Medical Devices , and Other ***        Does the Patient have a Wound? Yes wound(s) were present on assessment. LDA wound assessment was Initiated and completed by RN       Warren Prevention initiated by RN: Yes  Wound Care Orders initiated by RN: No    Pressure Injury (Stage 3,4, Unstageable, DTI, NWPT, and Complex wounds) if present, place Wound referral order by RN under : No    New Ostomies, if present place, Ostomy referral order under : No     Nurse 1 eSignature: Electronically signed by Carmela Fonseca RN on 11/21/24 at 8:05 PM EST    **SHARE this note so that the co-signing nurse can place an eSignature**    Nurse 2 eSignature: {Esignature:[id number]}

## 2024-11-22 NOTE — NURSE NAVIGATOR
Rounded on patient s/p right total hip replacement with Dr. Arthur, dos 11/21/2024. Patient observed to be alert and oriented x 3, sitting up in bed. All education was provided to patient last night with his family present when patient was transferred to 5th floor due to dense spinal and continued numbness in his legs. Patient was able to transfer to bed last night with coordinator with use of rolling walker but did demonstrate difficulty picking right foot up due to numbness. This morning patient reports that his pain was well controlled throughout the night and that he has been voiding without difficulty. His dressing remains clean, dry and intact. He reports already ambulating with physical therapy, completing the stairs and working with occupational therapy. He states that he had no difficulty dressing himself. He states that numbness improved late last night but he did not get out of bed until this morning. He was up at 4 am and reports reading through all the education that he was given last night and found it to be very helpful. Reminded patient to continue to use incentive spirometry for the next few days to keep lungs expanded and free from complications. He was educated that lack of use could result in low grade fevers of 99- 100. He was reminded that fevers are not worrisome unless they go above 101.3 and remain despite use of incentive spirometry. He was reminded that jess hose are for daytime wear only to assist with swelling and should be removed nightly. Reviewed postoperative showering instructions. Patient was reminded that he may shower on Saturday. No tubs or submersion in water for a full six weeks. He is to contact clinic with any dressing issues. He was instructed that in the event that dressing becomes saturated he is to reinforce dressing and wrap and call office or on call doctor  for dressing change. Reminded patient of the importance of hourly ambulation 5 minutes each hour with his

## 2024-11-22 NOTE — PLAN OF CARE
Problem: Pain  Goal: Verbalizes/displays adequate comfort level or baseline comfort level  11/21/2024 2202 by Jcarlos Bass, YVAN  Outcome: Progressing  Flowsheets (Taken 11/21/2024 2002 by Carmela Fonseca, RN)  Verbalizes/displays adequate comfort level or baseline comfort level: Implement non-pharmacological measures as appropriate and evaluate response  Note: Monitor and manage patient's pain, encourage patient to call for prn pain medication. Encourage non pharmacological approach to pain control.  11/21/2024 2002 by Carmela Fonseca RN  Outcome: Progressing  Flowsheets  Taken 11/21/2024 2002  Verbalizes/displays adequate comfort level or baseline comfort level: Implement non-pharmacological measures as appropriate and evaluate response  Taken 11/21/2024 1911  Verbalizes/displays adequate comfort level or baseline comfort level: Encourage patient to monitor pain and request assistance  Note: Pt denies pain at this time     Problem: Chronic Conditions and Co-morbidities  Goal: Patient's chronic conditions and co-morbidity symptoms are monitored and maintained or improved  Outcome: Progressing  Flowsheets (Taken 11/21/2024 1855 by Carmela Fonseca, RN)  Care Plan - Patient's Chronic Conditions and Co-Morbidity Symptoms are Monitored and Maintained or Improved: Monitor and assess patient's chronic conditions and comorbid symptoms for stability, deterioration, or improvement  Note: Prioritize education  on self management. Regular monitoring of key health indicators, and coordinate care between different health care provider to optimize treatment and minimize complications.     Problem: Discharge Planning  Goal: Discharge to home or other facility with appropriate resources  Outcome: Progressing  Flowsheets (Taken 11/21/2024 1855 by Carmela Fonseca, RN)  Discharge to home or other facility with appropriate resources: Identify barriers to discharge with patient and caregiver  Note: Verbalize

## 2024-11-22 NOTE — PROGRESS NOTES
S:   No events  Ambulated with nursing and PT today  No chest pain, shortness of breath, nausea,vomiting, fever, or chills  Tolerating PO  Voiding spontaneously  Passing gas    O:   Patient Vitals for the past 8 hrs:   Temp Pulse Resp BP SpO2   11/22/24 0800 98.2 °F (36.8 °C) 53 18 116/75 98 %   11/22/24 0713 -- -- 18 -- --   11/22/24 0700 -- 65 -- -- --   11/22/24 0327 98.1 °F (36.7 °C) 97 18 139/78 98 %   11/22/24 0242 -- -- 18 -- --          Alert, Oriented, NAD, non labored  Operative extremity: right hip  Dressing clean, dry, intact  Extremity 2+, no swelling, sensation and motor intact throughout. No calf pain.  Quad firing fully      A/P:   64 y.o. male post-op day 1 Day Post-Op status post Procedure(s):  right Hip Total Arthroplasty with BILLIE, direct anterior approach   doing well. Postoperative protocol discussed.     - WBAT, OOB as much as tolerated.  - Abx x 24hours post-op (finish AM of POD#1)  - DVT prophy: TEDs, SCDs, asa  - Pain control: Tylenol, Nsaid (Toradol then oral), Tramadol, Oxycodone  - Ice, elevation operative extremity  - Labs: Acute blood loss anemia, Hgb 11.1, asymptomatic   - PT/OT  - Dispo: D/C when clears physical therapy and pain controlled       Ramona Jean PA-C  11/22/2024  10:21 AM

## 2024-11-22 NOTE — PLAN OF CARE
Problem: Pain  Goal: Verbalizes/displays adequate comfort level or baseline comfort level  Outcome: Progressing  Flowsheets  Taken 11/21/2024 2002  Verbalizes/displays adequate comfort level or baseline comfort level: Implement non-pharmacological measures as appropriate and evaluate response  Taken 11/21/2024 1911  Verbalizes/displays adequate comfort level or baseline comfort level: Encourage patient to monitor pain and request assistance  Note: Pt denies pain at this time

## 2024-11-22 NOTE — CARE COORDINATION
11/22/24 0949   IMM Letter   Observation Status Letter date given: 11/22/24   Observation Status Letter time given: 0939   Observation Status Letter given to Patient/Family/Significant other/Guardian/POA/by: Zelda Mark       
CM spoke with patient at the bedside.   No CM needs at this time.   Patient has DME Rolling Walker and BSC already at the bedside.   Patient's brother to transport patient home today for discharge.            Zelda Mark RN  Case Management [phone]   
D/C order noted for today. Orders reviewed. No needs identified at this time. Patient's brother will be transporting patient home today for discharge. CM remains available if needed.           Zelda Mark, [phone]   
Patient attended total joint class on 10/30/2024 for total hip replacement with Dr. Arthur  scheduled for 11/21/2024. It was explained in class that this is considered an outpatient elective procedure and that baring medical complications the expectation was for same day discharge. It was explained the importance of a strong support system that must be present the day of surgery and willing to spend the first night home with the patient. Patient identified his family member as his support system. Reviewed the importance of smoking cessation, eating healthy, maintaining healthy weight and refraining from any type of recreational drug use. Reviewed the correct way to shower preoperatively using the CHG wash that was provided to the patient. Patient was instructed to sleep in clean sheet the evening prior to surgery. It was explained that if the patient did not already have in their possession a walker with two wheels in the front, that one would be provided to them and adjusted accordingly to their height by physical therapy. Patient was reminded that other items such as bedside commodes and shower chairs are typically not covered by insurance but can be ordered if recommended by occupational therapy. It was suggested that if these were items, if the patient wished to have them, could purchase for a fraction of the cost through [employer], [employer] or even [employer]. Patient was educated regarding both spinal anesthesia and general anesthesia with emphasis on the fact that no patient is awake during surgery. Reviewed preoperative block procedures for both hips and knees that take place in the preoperative area with anesthesia and explained that it is at the a choice between surgeon and patient as to if these blocks are used. Reviewed realistic expectations of pain postoperatively. Patient reminded that pain expectation immediately postop should never be less than what they are prior to surgery. Education provided that 
  Current DME Prior to Arrival Cane;Walker;Bedside Commode   Potential Assistance Needed N/A   DME Ordered? Bedside commode;Walker   Potential Assistance Purchasing Medications No   Type of Home Care Services None   Patient expects to be discharged to: Apartment   Follow Up Appointment: Best Day/Time  Monday AM  (Patient prefers morning appointments, any day of the week.)   Interior Rails Both   History of falls? 0   Services At/After Discharge   Transition of Care Consult (CM Consult) N/A   Services At/After Discharge None    Resource Information Provided? No   Mode of Transport at Discharge Self  (Patient's brother to transport patient home today for discharge.)   Hospital Transport Time of Discharge   (TBD today at discharge.)   Confirm Follow Up Transport Family   Condition of Participation: Discharge Planning   The Plan for Transition of Care is related to the following treatment goals: Patient is medically ready for discharge today.   The Patient and/or Patient Representative was provided with a Choice of Provider?   (Not applicable. No CM needs at this time.)   Freedom of Choice list was provided with basic dialogue that supports the patient's individualized plan of care/goals, treatment preferences, and shares the quality data associated with the providers?  No  (Not applicable. No CM needs at this time.)   Documentation for Discharge Appeal   Discharge Appealed by Other (comment)  (Not applicable.)

## 2024-11-22 NOTE — PLAN OF CARE
Problem: Pain  Goal: Verbalizes/displays adequate comfort level or baseline comfort level  11/22/2024 1000 by Milla Spence RN  Outcome: Progressing  Flowsheets (Taken 11/22/2024 1000)  Verbalizes/displays adequate comfort level or baseline comfort level:   Encourage patient to monitor pain and request assistance   Assess pain using appropriate pain scale   Administer analgesics based on type and severity of pain and evaluate response   Implement non-pharmacological measures as appropriate and evaluate response  Note: Patient's pain level will be monitor closely and maintain at a appropriate level with both nonpharmacological interventions and pharmacological interventions   11/21/2024 2202 by Jcarlos Bass RN  Outcome: Progressing  Flowsheets (Taken 11/21/2024 2002 by Carmela Fonseca RN)  Verbalizes/displays adequate comfort level or baseline comfort level: Implement non-pharmacological measures as appropriate and evaluate response  Note: Monitor and manage patient's pain, encourage patient to call for prn pain medication. Encourage non pharmacological approach to pain control.  11/21/2024 2002 by Carmela Fonseca RN  Outcome: Progressing  Flowsheets  Taken 11/21/2024 2002  Verbalizes/displays adequate comfort level or baseline comfort level: Implement non-pharmacological measures as appropriate and evaluate response  Taken 11/21/2024 1911  Verbalizes/displays adequate comfort level or baseline comfort level: Encourage patient to monitor pain and request assistance  Note: Pt denies pain at this time     Problem: Chronic Conditions and Co-morbidities  Goal: Patient's chronic conditions and co-morbidity symptoms are monitored and maintained or improved  11/22/2024 1000 by Milla Spence RN  Outcome: Progressing  Flowsheets  Taken 11/22/2024 1000  Care Plan - Patient's Chronic Conditions and Co-Morbidity Symptoms are Monitored and Maintained or Improved:   Monitor and assess patient's chronic

## 2024-11-22 NOTE — PROGRESS NOTES
Discharge summary review with patient. Patient verbalized understanding, no concerns voiced. IV  removed  without difficulty;All patient belongings with patient. Patient transported via wheel chair by transport to main entrance of hospital accompanied by family member.

## 2024-11-25 ENCOUNTER — TELEPHONE (OUTPATIENT)
Facility: HOSPITAL | Age: 64
End: 2024-11-25

## 2024-11-25 NOTE — TELEPHONE ENCOUNTER
Attempted to reach patient regarding postoperative follow up. Vm left for patient to return call to coordinator.    Pls advise on Colchicine refill.

## 2024-12-06 ENCOUNTER — OFFICE VISIT (OUTPATIENT)
Age: 64
End: 2024-12-06

## 2024-12-06 VITALS — BODY MASS INDEX: 24.72 KG/M2 | WEIGHT: 203 LBS

## 2024-12-06 DIAGNOSIS — Z47.89 ORTHOPEDIC AFTERCARE: ICD-10-CM

## 2024-12-06 DIAGNOSIS — Z96.641 STATUS POST RIGHT HIP REPLACEMENT: Primary | ICD-10-CM

## 2024-12-06 NOTE — PROGRESS NOTES
Patient: Robert Clements Jr.                MRN: [id number]       SSN: [id number]  YOB: 1960        AGE: 64 y.o.        SEX: male  BMI: Body mass index is 24.72 kg/m².    PCP: Emeka Brown MD  12/06/24    Chief Complaint: Hip Pain (right Hip Total Arthroplasty with BILLIE, direct anterior approach)      1. Status post right hip replacement  -     AMB POC X-RAY RADEX HIP UNI WITH PELVIS 2-3 VIEWS  2. Orthopedic aftercare            HPI:  Robert Clements Jr. is a 64 y.o. male with chief complaint of   Chief Complaint   Patient presents with    Hip Pain     right Hip Total Arthroplasty with BILLIE, direct anterior approach     DOS SURGERY   11/21/24 Right FAIZA with BILLIE, Direct Anterior Approach  Implants:                Joseph:                Femur stem: Insignia size 6, high offset                Acetabulum cup: 54mm                Bearing: neutral poly                Head: 36mm ceramic, -5mm offset     -1/30/2024: Right hip intra-articular cortisone injection, no relief  -Right hip intra-articular cortisone injection 8/18/2023 with 4 days of relief  -Right knee intra-articular cortisone injection 11/28/2023 with 3 weeks of relief    Patient with right knee and right hip pain for about a year.  He saw [facility] 3 months ago and was set up for a hip injection which he had recently and reports he got 4 days of improvement where he was walking without a limp.  He was also prescribed an MRI as well as Mobic.  Patient does not feel like the Mobic helped significantly, only a small amount.  Patient has diabetes, hypertension and smokes Black and milds.    Works on his feet in the [employer] business.     IMAGING:  Imaging read by myself and interpreted as follows:  December 6, 2024  3 view x-rays of the right hip including AP pelvis, AP and lateral demonstrates well-positioned total hip arthroplasty without evidence of periprosthetic fracture or loosening.  Leg lengths appear to be equal    November

## 2024-12-09 ENCOUNTER — OFFICE VISIT (OUTPATIENT)
Facility: CLINIC | Age: 64
End: 2024-12-09
Payer: MEDICARE

## 2024-12-09 ENCOUNTER — HOSPITAL ENCOUNTER (OUTPATIENT)
Facility: HOSPITAL | Age: 64
Setting detail: SPECIMEN
Discharge: HOME | End: 2024-12-12
Payer: MEDICARE

## 2024-12-09 VITALS
TEMPERATURE: 97.1 F | HEIGHT: 76 IN | HEART RATE: 90 BPM | OXYGEN SATURATION: 97 % | BODY MASS INDEX: 25.94 KG/M2 | RESPIRATION RATE: 16 BRPM | WEIGHT: 213 LBS | SYSTOLIC BLOOD PRESSURE: 130 MMHG | DIASTOLIC BLOOD PRESSURE: 70 MMHG

## 2024-12-09 DIAGNOSIS — I10 ESSENTIAL HYPERTENSION: ICD-10-CM

## 2024-12-09 DIAGNOSIS — E78.5 HYPERLIPIDEMIA, UNSPECIFIED HYPERLIPIDEMIA TYPE: ICD-10-CM

## 2024-12-09 DIAGNOSIS — Z23 NEEDS FLU SHOT: ICD-10-CM

## 2024-12-09 DIAGNOSIS — E55.9 VITAMIN D DEFICIENCY: ICD-10-CM

## 2024-12-09 DIAGNOSIS — N52.9 ERECTILE DYSFUNCTION, UNSPECIFIED ERECTILE DYSFUNCTION TYPE: ICD-10-CM

## 2024-12-09 DIAGNOSIS — D64.9 ANEMIA, UNSPECIFIED TYPE: ICD-10-CM

## 2024-12-09 DIAGNOSIS — E11.9 TYPE 2 DIABETES MELLITUS WITHOUT COMPLICATION, WITHOUT LONG-TERM CURRENT USE OF INSULIN (HCC): Primary | ICD-10-CM

## 2024-12-09 DIAGNOSIS — Z23 ENCOUNTER FOR VACCINATION: ICD-10-CM

## 2024-12-09 LAB
FERRITIN SERPL-MCNC: 133 NG/ML (ref 8–388)
HAPTOGLOB SERPL-MCNC: 206 MG/DL (ref 30–200)
IRON SATN MFR SERPL: 15 % (ref 20–50)
IRON SERPL-MCNC: 48 UG/DL (ref 50–175)
LDH SERPL L TO P-CCNC: 194 U/L (ref 81–234)
RETICS/RBC NFR AUTO: 2.5 % (ref 0.5–2.5)
TIBC SERPL-MCNC: 313 UG/DL (ref 250–450)

## 2024-12-09 PROCEDURE — G8482 FLU IMMUNIZE ORDER/ADMIN: HCPCS | Performed by: INTERNAL MEDICINE

## 2024-12-09 PROCEDURE — G8427 DOCREV CUR MEDS BY ELIG CLIN: HCPCS | Performed by: INTERNAL MEDICINE

## 2024-12-09 PROCEDURE — 1159F MED LIST DOCD IN RCRD: CPT | Performed by: INTERNAL MEDICINE

## 2024-12-09 PROCEDURE — 83550 IRON BINDING TEST: CPT

## 2024-12-09 PROCEDURE — G0008 ADMIN INFLUENZA VIRUS VAC: HCPCS | Performed by: INTERNAL MEDICINE

## 2024-12-09 PROCEDURE — 99214 OFFICE O/P EST MOD 30 MIN: CPT | Performed by: INTERNAL MEDICINE

## 2024-12-09 PROCEDURE — 4004F PT TOBACCO SCREEN RCVD TLK: CPT | Performed by: INTERNAL MEDICINE

## 2024-12-09 PROCEDURE — 3075F SYST BP GE 130 - 139MM HG: CPT | Performed by: INTERNAL MEDICINE

## 2024-12-09 PROCEDURE — 1160F RVW MEDS BY RX/DR IN RCRD: CPT | Performed by: INTERNAL MEDICINE

## 2024-12-09 PROCEDURE — 3017F COLORECTAL CA SCREEN DOC REV: CPT | Performed by: INTERNAL MEDICINE

## 2024-12-09 PROCEDURE — 3044F HG A1C LEVEL LT 7.0%: CPT | Performed by: INTERNAL MEDICINE

## 2024-12-09 PROCEDURE — G0009 ADMIN PNEUMOCOCCAL VACCINE: HCPCS | Performed by: INTERNAL MEDICINE

## 2024-12-09 PROCEDURE — 83010 ASSAY OF HAPTOGLOBIN QUANT: CPT

## 2024-12-09 PROCEDURE — 36415 COLL VENOUS BLD VENIPUNCTURE: CPT

## 2024-12-09 PROCEDURE — 3078F DIAST BP <80 MM HG: CPT | Performed by: INTERNAL MEDICINE

## 2024-12-09 PROCEDURE — G8419 CALC BMI OUT NRM PARAM NOF/U: HCPCS | Performed by: INTERNAL MEDICINE

## 2024-12-09 PROCEDURE — 1123F ACP DISCUSS/DSCN MKR DOCD: CPT | Performed by: INTERNAL MEDICINE

## 2024-12-09 PROCEDURE — 90677 PCV20 VACCINE IM: CPT | Performed by: INTERNAL MEDICINE

## 2024-12-09 PROCEDURE — 90653 IIV ADJUVANT VACCINE IM: CPT | Performed by: INTERNAL MEDICINE

## 2024-12-09 PROCEDURE — 2022F DILAT RTA XM EVC RTNOPTHY: CPT | Performed by: INTERNAL MEDICINE

## 2024-12-09 PROCEDURE — 83615 LACTATE (LD) (LDH) ENZYME: CPT

## 2024-12-09 PROCEDURE — 82728 ASSAY OF FERRITIN: CPT

## 2024-12-09 PROCEDURE — 83540 ASSAY OF IRON: CPT

## 2024-12-09 PROCEDURE — 85045 AUTOMATED RETICULOCYTE COUNT: CPT

## 2024-12-09 RX ORDER — AMLODIPINE BESYLATE 10 MG/1
10 TABLET ORAL EVERY MORNING
Qty: 90 TABLET | Refills: 1 | Status: SHIPPED | OUTPATIENT
Start: 2024-12-09

## 2024-12-09 RX ORDER — ATORVASTATIN CALCIUM 20 MG/1
20 TABLET, FILM COATED ORAL EVERY MORNING
Qty: 90 TABLET | Refills: 1 | Status: SHIPPED | OUTPATIENT
Start: 2024-12-09

## 2024-12-09 RX ORDER — CHOLECALCIFEROL (VITAMIN D3) 25 MCG
1000 TABLET ORAL DAILY
Qty: 90 TABLET | Refills: 1 | Status: SHIPPED | OUTPATIENT
Start: 2024-12-09

## 2024-12-09 RX ORDER — SILDENAFIL 100 MG/1
100 TABLET, FILM COATED ORAL DAILY PRN
Qty: 30 TABLET | Refills: 1 | Status: SHIPPED | OUTPATIENT
Start: 2024-12-09

## 2024-12-09 RX ORDER — METFORMIN HYDROCHLORIDE 500 MG/1
500 TABLET, EXTENDED RELEASE ORAL
Qty: 90 TABLET | Refills: 1 | Status: SHIPPED | OUTPATIENT
Start: 2024-12-09

## 2024-12-09 RX ORDER — PROCHLORPERAZINE 25 MG/1
SUPPOSITORY RECTAL
Qty: 3 EACH | Refills: 2 | Status: SHIPPED | OUTPATIENT
Start: 2024-12-09

## 2024-12-09 RX ORDER — HYDROCHLOROTHIAZIDE 25 MG/1
25 TABLET ORAL DAILY
Qty: 90 TABLET | Refills: 1 | Status: SHIPPED | OUTPATIENT
Start: 2024-12-09

## 2024-12-09 ASSESSMENT — ENCOUNTER SYMPTOMS
ABDOMINAL PAIN: 0
SHORTNESS OF BREATH: 0

## 2024-12-09 NOTE — PROGRESS NOTES
Subjective:      Patient ID: Robert Clements Jr. is a 64 y.o. male.    Follow up    Patient is coming for regular follow-up of chronic conditions.  Blood pressure is well-controlled.   Recent A1c is controlled.  Congratulated.  There is pending a workup for anemia, patient is taking iron tablet 3 times a week.  Will complete today.  Requesting flu shot and Prevnar.  Will have the second dose of shingles at local pharmacy in 2 weeks.  He feels fully recovered from recent orthopedic surgery.  He has no other complaints or concerns.          Pre diabetes  Metformin  mg daily on 9/13/2023     NAFLD   US 2021: Increased hepatic echogenicity, diffuse hepatocellular disease - most commonly seen in fatty infiltration. No discrete mass or biliary dilatation.  Hepatitis panel negative.  Aldolase negative.  HIV negative.  Order pending workup.     Elevated protein, BUN  Protein electrophoresis: SPE pattern reflects a polyclonal increase in gamma globulin. Hypergammaglobulinemia is found in a wide variety of infectious, non-infectious, and autoimmune disease states. Evidence of monoclonal protein is not apparent.      Episodic abdominal pain  H. pylori was neg, will need upper endoscopy if pepcid does not help.  No current symptoms.     Advanced right hip arthritis  Advanced patellofemoral disease   Radiographs obtained 7/25/2023   MRI obtained.  [facility].     Essential hypertension  Amlodipine, hydrochlorothiazide.     Hyperlipidemia  Atorvastatin. Started around 2018.     Allergic rhinitis  Zyrtec as needed.     History of vitamin D deficiency  Not taking supplements currently.     Family history of colon cancer  Patient's mother by the age of 74 years old.  Last patient colonoscopy 2015 he was told it was normal.  No weight loss, no blood in the stools, no constipation.     Hx of Hemorrhoids   No current complaints.     Erectile dysfunction  Viagra as needed, not currently using.    Hypertension  Pertinent

## 2024-12-13 LAB — HEMOCCULT STL QL IA: NEGATIVE

## 2025-01-06 NOTE — PROGRESS NOTES
subchondral sclerosis, subchondral cyst formation, and osteophytosis. There is no evidence of acetabular erosion. Leg lengths appear to be about equal.     2 view x-ray of the lumbar spine including sitting and standing demonstrates change in SS of 16. Extensive degenerative changes through out the lumbar spine.    September 27, 2023:  MRI of the right knee demonstrates grade IV chondromalacia of the medial patellar facet with subchondral edema in the area.  There is also grade IV chondromalacia in the anterior medial femoral condyle with underlying subchondral edema.  Lateral compartment is maintained, there are no meniscal tears and the cruciate and collateral ligaments are intact.    July 25, 2023:  4 view x-rays of the right knee including AP, lateral, sunrise and notch view taken at the high [place] location demonstrate subchondral edema, subchondral cysts and 75% joint space narrowing the medial patellar facet.  There is 25% joint space narrowing the medial compartment with squaring of the condyles and subchondral edema.  AP pelvis demonstrates 90% joint space narrowing in the right hip with osteophytes, subchondral sclerosis and subchondral edema.  There is no flattening of the femoral head and no excessive erosion of the acetabulum.      PHYSICAL EXAMINATION:  Ht 1.93 m (6' 4\")   Wt 92.4 kg (203 lb 12.8 oz)   BMI 24.81 kg/m²   Body mass index is 24.81 kg/m².  Wt Readings from Last 3 Encounters:   01/07/25 92.4 kg (203 lb 12.8 oz)   12/09/24 96.6 kg (213 lb)   12/06/24 92.1 kg (203 lb)       GENERAL: Alert and oriented x3, in no acute distress.  HEENT: Normocephalic, atraumatic.    MSK: Right Knee Exam     Tenderness   The patient is experiencing tenderness in the medial joint line and patella.    Range of Motion   Extension:  0   Flexion:  120     Tests   Varus: negative Valgus: negative  Drawer:  Anterior - negative    Posterior - negative    Other   Erythema: absent  Sensation: normal  Pulse:

## 2025-01-07 ENCOUNTER — OFFICE VISIT (OUTPATIENT)
Age: 65
End: 2025-01-07

## 2025-01-07 VITALS — WEIGHT: 203.8 LBS | HEIGHT: 76 IN | BODY MASS INDEX: 24.82 KG/M2

## 2025-01-07 DIAGNOSIS — Z47.89 ORTHOPEDIC AFTERCARE: ICD-10-CM

## 2025-01-07 DIAGNOSIS — Z96.641 STATUS POST RIGHT HIP REPLACEMENT: Primary | ICD-10-CM

## 2025-01-07 PROCEDURE — 99024 POSTOP FOLLOW-UP VISIT: CPT

## 2025-01-13 ENCOUNTER — TELEPHONE (OUTPATIENT)
Facility: CLINIC | Age: 65
End: 2025-01-13

## 2025-01-13 NOTE — TELEPHONE ENCOUNTER
Elif from Prior auth dept called to get questions answer for the PA on the Dexcom. Per Elif the dexcom needs to be submitted under Part B when submitted to the insurance. All questions was answered. Clinical notes was faxed to [phone].     Ref# [id number]    PA dept call back number is [phone]

## 2025-01-17 DIAGNOSIS — E11.9 TYPE 2 DIABETES MELLITUS WITHOUT COMPLICATION, WITHOUT LONG-TERM CURRENT USE OF INSULIN (HCC): ICD-10-CM

## 2025-01-17 DIAGNOSIS — E78.5 HYPERLIPIDEMIA, UNSPECIFIED HYPERLIPIDEMIA TYPE: ICD-10-CM

## 2025-01-17 DIAGNOSIS — E55.9 VITAMIN D DEFICIENCY: ICD-10-CM

## 2025-01-17 DIAGNOSIS — N52.9 ERECTILE DYSFUNCTION, UNSPECIFIED ERECTILE DYSFUNCTION TYPE: ICD-10-CM

## 2025-01-17 DIAGNOSIS — J30.1 SEASONAL ALLERGIC RHINITIS DUE TO POLLEN: ICD-10-CM

## 2025-01-17 DIAGNOSIS — D64.9 ANEMIA, UNSPECIFIED TYPE: ICD-10-CM

## 2025-01-17 DIAGNOSIS — I10 ESSENTIAL HYPERTENSION: ICD-10-CM

## 2025-01-17 NOTE — TELEPHONE ENCOUNTER
Last Appointment:  12/9/2024  Future Appointments   Date Time Provider Department Center   6/9/2025  8:45 AM Emeka Brown MD [facility]

## 2025-01-19 RX ORDER — AMLODIPINE BESYLATE 10 MG/1
10 TABLET ORAL EVERY MORNING
Qty: 90 TABLET | Refills: 1 | Status: SHIPPED | OUTPATIENT
Start: 2025-01-19

## 2025-01-19 RX ORDER — SILDENAFIL 100 MG/1
100 TABLET, FILM COATED ORAL DAILY PRN
Qty: 30 TABLET | Refills: 1 | Status: SHIPPED | OUTPATIENT
Start: 2025-01-19

## 2025-01-19 RX ORDER — ATORVASTATIN CALCIUM 20 MG/1
20 TABLET, FILM COATED ORAL EVERY MORNING
Qty: 90 TABLET | Refills: 1 | Status: SHIPPED | OUTPATIENT
Start: 2025-01-19

## 2025-01-19 RX ORDER — METFORMIN HYDROCHLORIDE 500 MG/1
500 TABLET, EXTENDED RELEASE ORAL
Qty: 90 TABLET | Refills: 1 | Status: SHIPPED | OUTPATIENT
Start: 2025-01-19

## 2025-01-19 RX ORDER — HYDROCHLOROTHIAZIDE 25 MG/1
25 TABLET ORAL DAILY
Qty: 90 TABLET | Refills: 1 | Status: SHIPPED | OUTPATIENT
Start: 2025-01-19

## 2025-01-19 RX ORDER — FERROUS SULFATE 325(65) MG
325 TABLET ORAL
Qty: 50 TABLET | Refills: 0 | Status: SHIPPED | OUTPATIENT
Start: 2025-01-20

## 2025-01-19 RX ORDER — CETIRIZINE HYDROCHLORIDE 10 MG/1
10 TABLET ORAL DAILY PRN
Qty: 30 TABLET | Refills: 0 | Status: SHIPPED | OUTPATIENT
Start: 2025-01-19

## 2025-01-19 RX ORDER — CHOLECALCIFEROL (VITAMIN D3) 25 MCG
1000 TABLET ORAL DAILY
Qty: 90 TABLET | Refills: 1 | Status: SHIPPED | OUTPATIENT
Start: 2025-01-19

## 2025-01-24 ENCOUNTER — CLINICAL DOCUMENTATION (OUTPATIENT)
Facility: CLINIC | Age: 65
End: 2025-01-24

## 2025-01-24 NOTE — PROGRESS NOTES
Patient did not qualify by insurance to use Dexcom G6 covered since he does not present problematic diabetes or history of hypoglycemia, glucose <54.

## 2025-06-09 ENCOUNTER — OFFICE VISIT (OUTPATIENT)
Facility: CLINIC | Age: 65
End: 2025-06-09
Payer: MEDICARE

## 2025-06-09 ENCOUNTER — HOSPITAL ENCOUNTER (OUTPATIENT)
Facility: HOSPITAL | Age: 65
Setting detail: SPECIMEN
Discharge: HOME | End: 2025-06-12
Payer: MEDICARE

## 2025-06-09 VITALS
RESPIRATION RATE: 17 BRPM | HEIGHT: 76 IN | TEMPERATURE: 97.5 F | BODY MASS INDEX: 24.74 KG/M2 | HEART RATE: 81 BPM | SYSTOLIC BLOOD PRESSURE: 121 MMHG | DIASTOLIC BLOOD PRESSURE: 82 MMHG | OXYGEN SATURATION: 98 % | WEIGHT: 203.2 LBS

## 2025-06-09 DIAGNOSIS — E78.5 HYPERLIPIDEMIA, UNSPECIFIED HYPERLIPIDEMIA TYPE: ICD-10-CM

## 2025-06-09 DIAGNOSIS — D64.9 ANEMIA, UNSPECIFIED TYPE: ICD-10-CM

## 2025-06-09 DIAGNOSIS — I10 ESSENTIAL HYPERTENSION: ICD-10-CM

## 2025-06-09 DIAGNOSIS — N52.9 ERECTILE DYSFUNCTION, UNSPECIFIED ERECTILE DYSFUNCTION TYPE: ICD-10-CM

## 2025-06-09 DIAGNOSIS — I48.91 ATRIAL FIBRILLATION, UNSPECIFIED TYPE (HCC): ICD-10-CM

## 2025-06-09 DIAGNOSIS — E55.9 VITAMIN D DEFICIENCY: ICD-10-CM

## 2025-06-09 DIAGNOSIS — J30.1 SEASONAL ALLERGIC RHINITIS DUE TO POLLEN: ICD-10-CM

## 2025-06-09 DIAGNOSIS — E11.29 TYPE 2 DIABETES MELLITUS WITH OTHER DIABETIC KIDNEY COMPLICATION, WITHOUT LONG-TERM CURRENT USE OF INSULIN (HCC): ICD-10-CM

## 2025-06-09 DIAGNOSIS — E11.9 TYPE 2 DIABETES MELLITUS WITHOUT COMPLICATION, WITHOUT LONG-TERM CURRENT USE OF INSULIN (HCC): ICD-10-CM

## 2025-06-09 DIAGNOSIS — E11.9 TYPE 2 DIABETES MELLITUS WITHOUT COMPLICATION, WITHOUT LONG-TERM CURRENT USE OF INSULIN (HCC): Primary | ICD-10-CM

## 2025-06-09 LAB
ALBUMIN SERPL-MCNC: 4.2 G/DL (ref 3.4–5)
ALBUMIN/GLOB SERPL: 1.1 (ref 0.8–1.7)
ALP SERPL-CCNC: 58 U/L (ref 45–117)
ALT SERPL-CCNC: 36 U/L (ref 10–50)
ANION GAP SERPL CALC-SCNC: 16 MMOL/L (ref 3–18)
AST SERPL-CCNC: 43 U/L (ref 10–38)
BASOPHILS # BLD: 0.01 K/UL (ref 0–0.1)
BASOPHILS NFR BLD: 0.2 % (ref 0–2)
BILIRUB SERPL-MCNC: 0.8 MG/DL (ref 0.2–1)
BUN SERPL-MCNC: 16 MG/DL (ref 6–23)
BUN/CREAT SERPL: 16 (ref 12–20)
CALCIUM SERPL-MCNC: 10.1 MG/DL (ref 8.5–10.1)
CHLORIDE SERPL-SCNC: 98 MMOL/L (ref 98–107)
CHOLEST SERPL-MCNC: 238 MG/DL
CO2 SERPL-SCNC: 23 MMOL/L (ref 21–32)
CREAT SERPL-MCNC: 1 MG/DL (ref 0.6–1.3)
CREAT UR-MCNC: 762 MG/DL (ref 30–125)
DIFFERENTIAL METHOD BLD: ABNORMAL
EOSINOPHIL # BLD: 0.21 K/UL (ref 0–0.4)
EOSINOPHIL NFR BLD: 4.8 % (ref 0–5)
ERYTHROCYTE [DISTWIDTH] IN BLOOD BY AUTOMATED COUNT: 15.4 % (ref 11.6–14.5)
GLOBULIN SER CALC-MCNC: 3.9 G/DL (ref 2–4)
GLUCOSE SERPL-MCNC: 115 MG/DL (ref 74–108)
HCT VFR BLD AUTO: 41.4 % (ref 36–48)
HDLC SERPL-MCNC: 104 MG/DL (ref 40–60)
HDLC SERPL: 2.3 (ref 0–5)
HGB BLD-MCNC: 13.8 G/DL (ref 13–16)
IMM GRANULOCYTES # BLD AUTO: 0.01 K/UL (ref 0–0.04)
IMM GRANULOCYTES NFR BLD AUTO: 0.2 % (ref 0–0.5)
LDLC SERPL CALC-MCNC: 118 MG/DL (ref 0–100)
LYMPHOCYTES # BLD: 1.41 K/UL (ref 0.9–3.6)
LYMPHOCYTES NFR BLD: 32.5 % (ref 21–52)
MCH RBC QN AUTO: 30 PG (ref 24–34)
MCHC RBC AUTO-ENTMCNC: 33.3 G/DL (ref 31–37)
MCV RBC AUTO: 90 FL (ref 78–100)
MICROALBUMIN UR-MCNC: 82.4 MG/DL (ref 0–3)
MICROALBUMIN/CREAT UR-RTO: 108 MG/G (ref 0–30)
MONOCYTES # BLD: 0.59 K/UL (ref 0.05–1.2)
MONOCYTES NFR BLD: 13.6 % (ref 3–10)
NEUTS SEG # BLD: 2.11 K/UL (ref 1.8–8)
NEUTS SEG NFR BLD: 48.7 % (ref 40–73)
NRBC # BLD: 0 K/UL (ref 0–0.01)
NRBC BLD-RTO: 0 PER 100 WBC
PLATELET # BLD AUTO: 226 K/UL (ref 135–420)
PMV BLD AUTO: 9.7 FL (ref 9.2–11.8)
POTASSIUM SERPL-SCNC: 4.1 MMOL/L (ref 3.5–5.5)
PROT SERPL-MCNC: 8.1 G/DL (ref 6.4–8.2)
RBC # BLD AUTO: 4.6 M/UL (ref 4.35–5.65)
SODIUM SERPL-SCNC: 137 MMOL/L (ref 136–145)
TRIGL SERPL-MCNC: 79 MG/DL (ref 0–150)
VLDLC SERPL CALC-MCNC: 16 MG/DL
WBC # BLD AUTO: 4.3 K/UL (ref 4.6–13.2)

## 2025-06-09 PROCEDURE — 3079F DIAST BP 80-89 MM HG: CPT | Performed by: INTERNAL MEDICINE

## 2025-06-09 PROCEDURE — 3074F SYST BP LT 130 MM HG: CPT | Performed by: INTERNAL MEDICINE

## 2025-06-09 PROCEDURE — 85025 COMPLETE CBC W/AUTO DIFF WBC: CPT

## 2025-06-09 PROCEDURE — 3017F COLORECTAL CA SCREEN DOC REV: CPT | Performed by: INTERNAL MEDICINE

## 2025-06-09 PROCEDURE — 1160F RVW MEDS BY RX/DR IN RCRD: CPT | Performed by: INTERNAL MEDICINE

## 2025-06-09 PROCEDURE — 82043 UR ALBUMIN QUANTITATIVE: CPT

## 2025-06-09 PROCEDURE — G8427 DOCREV CUR MEDS BY ELIG CLIN: HCPCS | Performed by: INTERNAL MEDICINE

## 2025-06-09 PROCEDURE — 1159F MED LIST DOCD IN RCRD: CPT | Performed by: INTERNAL MEDICINE

## 2025-06-09 PROCEDURE — 80053 COMPREHEN METABOLIC PANEL: CPT

## 2025-06-09 PROCEDURE — 4004F PT TOBACCO SCREEN RCVD TLK: CPT | Performed by: INTERNAL MEDICINE

## 2025-06-09 PROCEDURE — 99214 OFFICE O/P EST MOD 30 MIN: CPT | Performed by: INTERNAL MEDICINE

## 2025-06-09 PROCEDURE — 3046F HEMOGLOBIN A1C LEVEL >9.0%: CPT | Performed by: INTERNAL MEDICINE

## 2025-06-09 PROCEDURE — 36415 COLL VENOUS BLD VENIPUNCTURE: CPT

## 2025-06-09 PROCEDURE — G8420 CALC BMI NORM PARAMETERS: HCPCS | Performed by: INTERNAL MEDICINE

## 2025-06-09 PROCEDURE — 2022F DILAT RTA XM EVC RTNOPTHY: CPT | Performed by: INTERNAL MEDICINE

## 2025-06-09 PROCEDURE — 82570 ASSAY OF URINE CREATININE: CPT

## 2025-06-09 PROCEDURE — 80061 LIPID PANEL: CPT

## 2025-06-09 PROCEDURE — 1123F ACP DISCUSS/DSCN MKR DOCD: CPT | Performed by: INTERNAL MEDICINE

## 2025-06-09 RX ORDER — CHOLECALCIFEROL (VITAMIN D3) 25 MCG
1000 TABLET ORAL DAILY
Qty: 90 TABLET | Refills: 1 | Status: SHIPPED | OUTPATIENT
Start: 2025-06-09

## 2025-06-09 RX ORDER — HYDROCHLOROTHIAZIDE 25 MG/1
25 TABLET ORAL DAILY
Qty: 90 TABLET | Refills: 1 | Status: SHIPPED | OUTPATIENT
Start: 2025-06-09

## 2025-06-09 RX ORDER — ATORVASTATIN CALCIUM 20 MG/1
20 TABLET, FILM COATED ORAL EVERY MORNING
Qty: 90 TABLET | Refills: 1 | Status: SHIPPED | OUTPATIENT
Start: 2025-06-09 | End: 2025-06-15 | Stop reason: SDUPTHER

## 2025-06-09 RX ORDER — FERROUS SULFATE 325(65) MG
325 TABLET ORAL
Qty: 50 TABLET | Refills: 0 | Status: SHIPPED | OUTPATIENT
Start: 2025-06-09 | End: 2025-06-09 | Stop reason: SDUPTHER

## 2025-06-09 RX ORDER — FERROUS SULFATE 325(65) MG
325 TABLET ORAL
Qty: 90 TABLET | Refills: 1 | Status: SHIPPED | OUTPATIENT
Start: 2025-06-09

## 2025-06-09 RX ORDER — METFORMIN HYDROCHLORIDE 500 MG/1
500 TABLET, EXTENDED RELEASE ORAL
Qty: 90 TABLET | Refills: 1 | Status: SHIPPED | OUTPATIENT
Start: 2025-06-09

## 2025-06-09 RX ORDER — AMLODIPINE BESYLATE 10 MG/1
10 TABLET ORAL EVERY MORNING
Qty: 90 TABLET | Refills: 1 | Status: SHIPPED | OUTPATIENT
Start: 2025-06-09

## 2025-06-09 RX ORDER — SILDENAFIL 100 MG/1
100 TABLET, FILM COATED ORAL DAILY PRN
Qty: 30 TABLET | Refills: 1 | Status: SHIPPED | OUTPATIENT
Start: 2025-06-09

## 2025-06-09 RX ORDER — CETIRIZINE HYDROCHLORIDE 10 MG/1
10 TABLET ORAL DAILY PRN
Qty: 90 TABLET | Refills: 1 | Status: SHIPPED | OUTPATIENT
Start: 2025-06-09

## 2025-06-09 SDOH — ECONOMIC STABILITY: FOOD INSECURITY: WITHIN THE PAST 12 MONTHS, YOU WORRIED THAT YOUR FOOD WOULD RUN OUT BEFORE YOU GOT MONEY TO BUY MORE.: NEVER TRUE

## 2025-06-09 SDOH — ECONOMIC STABILITY: FOOD INSECURITY: WITHIN THE PAST 12 MONTHS, THE FOOD YOU BOUGHT JUST DIDN'T LAST AND YOU DIDN'T HAVE MONEY TO GET MORE.: NEVER TRUE

## 2025-06-09 ASSESSMENT — PATIENT HEALTH QUESTIONNAIRE - PHQ9
SUM OF ALL RESPONSES TO PHQ QUESTIONS 1-9: 0
1. LITTLE INTEREST OR PLEASURE IN DOING THINGS: NOT AT ALL
SUM OF ALL RESPONSES TO PHQ QUESTIONS 1-9: 0
2. FEELING DOWN, DEPRESSED OR HOPELESS: NOT AT ALL
SUM OF ALL RESPONSES TO PHQ QUESTIONS 1-9: 0
SUM OF ALL RESPONSES TO PHQ QUESTIONS 1-9: 0

## 2025-06-09 ASSESSMENT — ENCOUNTER SYMPTOMS
ABDOMINAL PAIN: 0
SHORTNESS OF BREATH: 0

## 2025-06-09 NOTE — PROGRESS NOTES
(CHOLECALCIFEROL) 25 MCG (1000 UT) TABS tablet      6. Erectile dysfunction, unspecified erectile dysfunction type  N52.9 sildenafil (VIAGRA) 100 MG tablet      7. Seasonal allergic rhinitis due to pollen  J30.1 cetirizine (ZYRTEC) 10 MG tablet      8. Encounter for vaccination  Z23 Zoster, SHINGRIX, (18 yrs +), IM      9. Atrial fibrillation, unspecified type ([facility])  I48.91 Comprehensive Metabolic Panel      10. Type 2 diabetes mellitus with other diabetic kidney complication, without long-term current use of insulin ([facility]CC)  E11.29 metFORMIN (GLUCOPHAGE-XR) 500 MG extended release tablet              Plan:   Return in about 25 weeks (around 12/1/2025) for Medicare Annual exam and HM..      Emeka Torrez MD

## 2025-06-15 ENCOUNTER — RESULTS FOLLOW-UP (OUTPATIENT)
Facility: CLINIC | Age: 65
End: 2025-06-15

## 2025-06-15 DIAGNOSIS — E11.9 TYPE 2 DIABETES MELLITUS WITHOUT COMPLICATION, WITHOUT LONG-TERM CURRENT USE OF INSULIN (HCC): ICD-10-CM

## 2025-06-15 DIAGNOSIS — E78.5 HYPERLIPIDEMIA, UNSPECIFIED HYPERLIPIDEMIA TYPE: ICD-10-CM

## 2025-06-15 RX ORDER — ATORVASTATIN CALCIUM 40 MG/1
40 TABLET, FILM COATED ORAL EVERY MORNING
Qty: 90 TABLET | Refills: 1 | Status: SHIPPED | OUTPATIENT
Start: 2025-06-15

## (undated) DEVICE — DRESSING FOAM POST OPERATIVE 4X10 IN MEPILEX BORDER AG

## (undated) DEVICE — MARKER RAD KNEE TIB CKPT STEREOTAXIC IMAG LESION LOC

## (undated) DEVICE — RECIPROCATING BLADE HEAVY DUTY, OFFSET  (77.5 X 1.23 X 11.0MM)

## (undated) DEVICE — SUIT SURG ISOLATN ZIP TOGA 2XL W/ PEELWY LENS T7 +

## (undated) DEVICE — 4-PORT MANIFOLD: Brand: NEPTUNE 2

## (undated) DEVICE — ELECTRODE PT RET AD L9FT HI MOIST COND ADH HYDRGEL CORDED

## (undated) DEVICE — DRAPE SURG W48XL52IN POLY U FEN REINF ADV ADH MATTE FINISH

## (undated) DEVICE — Device

## (undated) DEVICE — SUTURE MONOCRYL SZ 2-0 L36IN ABSRB UD L36MM CT-1 1/2 CIR Y945H

## (undated) DEVICE — ADHESIVE SKIN CLOSURE WND 8.661X1.5 IN 22 CM LIQUIBAND SECUR

## (undated) DEVICE — BIPOLAR SEALER 23-112-1 AQM 6.0: Brand: AQUAMANTYS ®

## (undated) DEVICE — CLEAN UP KIT: Brand: MEDLINE INDUSTRIES, INC.

## (undated) DEVICE — APPLICATOR MEDICATED 26 CC SOLUTION HI LT ORNG CHLORAPREP

## (undated) DEVICE — 3M™ STERI-DRAPE™ U-DRAPE 1015: Brand: STERI-DRAPE™

## (undated) DEVICE — NEEDLE SPNL 18GA L3.5IN W/ QNCKE SHARPER BVL DURA CLICK

## (undated) DEVICE — DRAPE C ARM UNIV W41XL74IN CLR PLAS XR VELC CLSR POLY STRP

## (undated) DEVICE — 3M™ STERI-DRAPE™ INSTRUMENT POUCH 1018: Brand: STERI-DRAPE™

## (undated) DEVICE — KIT TRK HIP PROC VIZADISC

## (undated) DEVICE — GLOVE SURG SZ 8 CRM LTX FREE POLYISOPRENE POLYMER BEAD ANTI

## (undated) DEVICE — DRAPE,TOP,102X53,STERILE: Brand: MEDLINE

## (undated) DEVICE — MARKER RAD 3.5MM HEX CKPT STEREOTAXIC IMAG LESION LOC FOR

## (undated) DEVICE — ELECTRODE BLDE L4IN NONINSULATED EDGE

## (undated) DEVICE — BLADE,STAINLESS-STEEL,10,STRL,DISPOSABLE: Brand: MEDLINE

## (undated) DEVICE — SHEET, DRAPE, SPLIT, STERILE: Brand: MEDLINE

## (undated) DEVICE — GLOVE SURG SZ 85 L12IN FNGR THK79MIL GRN LTX FREE

## (undated) DEVICE — KIT DRP FOR RIO ROBOTIC ARM ASST SYS (ORDER MUTLIPLES OF 10 EACH)

## (undated) DEVICE — STRYKER PERFORMANCE SERIES SAGITTAL BLADE: Brand: STRYKER PERFORMANCE SERIES

## (undated) DEVICE — SUTURE ETHIBOND EXCEL SZ 5 L30IN NONABSORBABLE GRN L40MM V-37 MB66G

## (undated) DEVICE — 2C14 #2 PDO 36 X 36: Brand: 2C14 #2 PDO 36 X 36

## (undated) DEVICE — PIN BNE FIX TEMP L170MM DIA4MM MAKO

## (undated) DEVICE — 2DSM19 2-0 UND MONODERM 30X30: Brand: 2DSM19 2-0 UND MONODERM 30X30

## (undated) DEVICE — HANDPIECE SET WITH HIGH FLOW TIP AND SUCTION TUBE: Brand: INTERPULSE

## (undated) DEVICE — HOOD WITH PEEL AWAY FACE SHIELD: Brand: T7PLUS

## (undated) DEVICE — SUTURE VICRYL SZ 1 L18IN ABSRB VLT CT-1 L36MM 1/2 CIR J741D

## (undated) DEVICE — ADHESIVE SKIN CLOSURE XL 42 CM 2.7 CC MESH LIQUIBAND SECUR